# Patient Record
Sex: FEMALE | Race: WHITE | NOT HISPANIC OR LATINO | Employment: UNEMPLOYED | URBAN - METROPOLITAN AREA
[De-identification: names, ages, dates, MRNs, and addresses within clinical notes are randomized per-mention and may not be internally consistent; named-entity substitution may affect disease eponyms.]

---

## 2022-10-03 ENCOUNTER — HOSPITAL ENCOUNTER (EMERGENCY)
Facility: HOSPITAL | Age: 77
Discharge: HOME | End: 2022-10-04
Attending: EMERGENCY MEDICINE
Payer: MEDICARE

## 2022-10-03 DIAGNOSIS — M62.838 MUSCLE SPASM: Primary | ICD-10-CM

## 2022-10-03 PROCEDURE — 99284 EMERGENCY DEPT VISIT MOD MDM: CPT

## 2022-10-03 RX ORDER — ORPHENADRINE CITRATE 30 MG/ML
30 INJECTION INTRAMUSCULAR; INTRAVENOUS
Status: COMPLETED | OUTPATIENT
Start: 2022-10-04 | End: 2022-10-03

## 2022-10-03 RX ORDER — HYDROCODONE BITARTRATE AND ACETAMINOPHEN 7.5; 325 MG/1; MG/1
1 TABLET ORAL EVERY 6 HOURS PRN
Status: DISCONTINUED | OUTPATIENT
Start: 2022-10-04 | End: 2022-10-04 | Stop reason: HOSPADM

## 2022-10-03 RX ORDER — KETOROLAC TROMETHAMINE 30 MG/ML
30 INJECTION, SOLUTION INTRAMUSCULAR; INTRAVENOUS
Status: COMPLETED | OUTPATIENT
Start: 2022-10-04 | End: 2022-10-03

## 2022-10-03 RX ADMIN — ORPHENADRINE CITRATE 30 MG: 30 INJECTION INTRAMUSCULAR; INTRAVENOUS at 11:10

## 2022-10-03 RX ADMIN — KETOROLAC TROMETHAMINE 30 MG: 30 INJECTION, SOLUTION INTRAMUSCULAR at 11:10

## 2022-10-04 VITALS
SYSTOLIC BLOOD PRESSURE: 138 MMHG | HEART RATE: 111 BPM | WEIGHT: 170 LBS | TEMPERATURE: 99 F | RESPIRATION RATE: 18 BRPM | OXYGEN SATURATION: 97 % | HEIGHT: 65 IN | DIASTOLIC BLOOD PRESSURE: 68 MMHG | BODY MASS INDEX: 28.32 KG/M2

## 2022-10-04 PROCEDURE — 96372 THER/PROPH/DIAG INJ SC/IM: CPT | Performed by: EMERGENCY MEDICINE

## 2022-10-04 PROCEDURE — 25000003 PHARM REV CODE 250: Performed by: EMERGENCY MEDICINE

## 2022-10-04 PROCEDURE — 63600175 PHARM REV CODE 636 W HCPCS: Performed by: EMERGENCY MEDICINE

## 2022-10-04 RX ORDER — DULOXETIN HYDROCHLORIDE 20 MG/1
20 CAPSULE, DELAYED RELEASE ORAL NIGHTLY
COMMUNITY
End: 2023-02-02 | Stop reason: SDUPTHER

## 2022-10-04 RX ORDER — HYDROMORPHONE HYDROCHLORIDE 2 MG/ML
1 INJECTION, SOLUTION INTRAMUSCULAR; INTRAVENOUS; SUBCUTANEOUS
Status: COMPLETED | OUTPATIENT
Start: 2022-10-04 | End: 2022-10-04

## 2022-10-04 RX ORDER — KETOROLAC TROMETHAMINE 10 MG/1
10 TABLET, FILM COATED ORAL EVERY 6 HOURS
COMMUNITY

## 2022-10-04 RX ORDER — GUAIFENESIN 600 MG/1
1200 TABLET, EXTENDED RELEASE ORAL 3 TIMES DAILY
COMMUNITY

## 2022-10-04 RX ADMIN — HYDROMORPHONE HYDROCHLORIDE 1 MG: 2 INJECTION INTRAMUSCULAR; INTRAVENOUS; SUBCUTANEOUS at 01:10

## 2022-10-04 RX ADMIN — HYDROCODONE BITARTRATE AND ACETAMINOPHEN 1 TABLET: 7.5; 325 TABLET ORAL at 12:10

## 2022-10-04 NOTE — ED PROVIDER NOTES
Encounter Date: 10/3/2022       History     Chief Complaint   Patient presents with    Spasms     Since 2 O clock today, pts daughter states this happens occasionally      Pt here with spasms in her RLE stump which is chronic recurrent. No trauma.     The history is provided by a relative.   Review of patient's allergies indicates:   Allergen Reactions    Penicillins      Past Medical History:   Diagnosis Date    Stroke      Past Surgical History:   Procedure Laterality Date    Right Above knee amputation       History reviewed. No pertinent family history.  Social History     Tobacco Use    Smoking status: Never    Smokeless tobacco: Never   Substance Use Topics    Alcohol use: Never    Drug use: Never     Review of Systems   Constitutional:  Negative for fever.   Musculoskeletal:  Negative for back pain.   Skin:  Negative for rash.   All other systems reviewed and are negative.    Physical Exam     Initial Vitals   BP Pulse Resp Temp SpO2   10/04/22 0027 10/03/22 2348 10/03/22 2348 10/03/22 2348 10/03/22 2348   (!) 174/77 (!) 111 18 98.6 °F (37 °C) 99 %      MAP       --                Physical Exam    Nursing note and vitals reviewed.  Constitutional: She appears well-developed and well-nourished. No distress.   HENT:   Mouth/Throat: Oropharynx is clear and moist.   Eyes: No scleral icterus.   Neck: Neck supple.   Normal range of motion.  Cardiovascular:  Normal rate, regular rhythm, normal heart sounds and intact distal pulses.           Pulmonary/Chest: Breath sounds normal.   Abdominal: Abdomen is soft. She exhibits no distension. There is no abdominal tenderness.   Musculoskeletal:      Cervical back: Normal range of motion and neck supple.      Comments: Intermittent flexion spasms in right AKA stump. No SOI. Otherwise normal MSK exam.     Neurological: She is alert. She has normal strength. No sensory deficit.   Baseline mental status per family   Skin: Skin is warm and dry. Capillary refill takes less than  2 seconds. No rash noted. No erythema. No pallor.       ED Course   Procedures  Labs Reviewed - No data to display       Imaging Results    None          Medications   HYDROcodone-acetaminophen 7.5-325 mg per tablet 1 tablet (1 tablet Oral Given 10/4/22 0050)   ketorolac injection 30 mg (30 mg Intramuscular Given 10/3/22 2356)   orphenadrine injection 30 mg (30 mg Intramuscular Given 10/3/22 2356)   HYDROmorphone (PF) injection 1 mg (1 mg Intramuscular Given 10/4/22 0120)     Medical Decision Making:   ED Management:  Pt presented with acute on chronic muscle spasms on her RLE stump/hip. She was given toradol, norflex and norco with minimal improvement. Pt given dilaudid with resolution of her pain/spasms.            ED Course as of 10/04/22 0244   Tue Oct 04, 2022   0118 No improvement with toradol, norflex and lortab.  [DC]      ED Course User Index  [DC] Sami Murillo Jr., MD                 Clinical Impression:   Final diagnoses:  [M62.838] Muscle spasm (Primary)      ED Disposition Condition    Discharge Stable          ED Prescriptions    None       Follow-up Information       Follow up With Specialties Details Why Contact Info    Primary care clinic  Schedule an appointment as soon as possible for a visit                Sami Murillo Jr., MD  10/04/22 0244

## 2022-10-04 NOTE — ED NOTES
Pt in bed, rocking back in forth.  Medication given at this time.  See MAR.  Pt tolerated well.  NAD noted. Will continue to monitor.

## 2022-10-04 NOTE — ED NOTES
Pt desating to 86% on RA.  Pt placed on 2L NC.  Pt resting comfortably in bed.  NAD noted.  Will continue to monitor.

## 2022-10-04 NOTE — ED TRIAGE NOTES
Pt with body spasms that started today at 2. Pts daughter states this usually happens when pt has an infection, currently being treated for an ear infection.

## 2022-10-10 ENCOUNTER — OFFICE VISIT (OUTPATIENT)
Dept: FAMILY MEDICINE | Facility: CLINIC | Age: 77
End: 2022-10-10
Payer: MEDICARE

## 2022-10-10 VITALS
WEIGHT: 110 LBS | DIASTOLIC BLOOD PRESSURE: 76 MMHG | HEART RATE: 71 BPM | HEIGHT: 65 IN | BODY MASS INDEX: 18.33 KG/M2 | SYSTOLIC BLOOD PRESSURE: 114 MMHG | OXYGEN SATURATION: 95 %

## 2022-10-10 DIAGNOSIS — R25.2 LEG CRAMPS: ICD-10-CM

## 2022-10-10 DIAGNOSIS — R56.9 SEIZURES: ICD-10-CM

## 2022-10-10 DIAGNOSIS — Z74.09 DIFFICULTY MOBILIZING IN HOME: ICD-10-CM

## 2022-10-10 DIAGNOSIS — Z89.611 HX OF AKA (ABOVE KNEE AMPUTATION), RIGHT: ICD-10-CM

## 2022-10-10 DIAGNOSIS — R68.2 DRY MOUTH: Primary | ICD-10-CM

## 2022-10-10 DIAGNOSIS — Z86.73 H/O: CVA (CEREBROVASCULAR ACCIDENT): ICD-10-CM

## 2022-10-10 DIAGNOSIS — I69.320 APHASIA AS LATE EFFECT OF CEREBROVASCULAR ACCIDENT: ICD-10-CM

## 2022-10-10 PROBLEM — M25.511 CHRONIC RIGHT SHOULDER PAIN: Status: ACTIVE | Noted: 2019-08-05

## 2022-10-10 PROBLEM — F41.8 ANXIETY WITH DEPRESSION: Status: ACTIVE | Noted: 2019-09-16

## 2022-10-10 PROBLEM — Z72.0 TOBACCO ABUSE: Status: ACTIVE | Noted: 2019-07-16

## 2022-10-10 PROBLEM — G89.29 CHRONIC RIGHT SHOULDER PAIN: Status: ACTIVE | Noted: 2019-08-05

## 2022-10-10 PROBLEM — R13.10 DYSPHAGIA: Status: ACTIVE | Noted: 2019-09-18

## 2022-10-10 PROCEDURE — 99214 PR OFFICE/OUTPT VISIT, EST, LEVL IV, 30-39 MIN: ICD-10-PCS | Mod: S$PBB,,, | Performed by: STUDENT IN AN ORGANIZED HEALTH CARE EDUCATION/TRAINING PROGRAM

## 2022-10-10 PROCEDURE — 99999 PR PBB SHADOW E&M-EST. PATIENT-LVL IV: CPT | Mod: PBBFAC,,, | Performed by: STUDENT IN AN ORGANIZED HEALTH CARE EDUCATION/TRAINING PROGRAM

## 2022-10-10 PROCEDURE — 99214 OFFICE O/P EST MOD 30 MIN: CPT | Mod: S$PBB,,, | Performed by: STUDENT IN AN ORGANIZED HEALTH CARE EDUCATION/TRAINING PROGRAM

## 2022-10-10 PROCEDURE — 99999 PR PBB SHADOW E&M-EST. PATIENT-LVL IV: ICD-10-PCS | Mod: PBBFAC,,, | Performed by: STUDENT IN AN ORGANIZED HEALTH CARE EDUCATION/TRAINING PROGRAM

## 2022-10-10 PROCEDURE — 99214 OFFICE O/P EST MOD 30 MIN: CPT | Mod: PBBFAC,PN | Performed by: STUDENT IN AN ORGANIZED HEALTH CARE EDUCATION/TRAINING PROGRAM

## 2022-10-10 RX ORDER — ASPIRIN 325 MG
325 TABLET, DELAYED RELEASE (ENTERIC COATED) ORAL
COMMUNITY

## 2022-10-10 RX ORDER — BACLOFEN 5 MG/1
5 TABLET ORAL
COMMUNITY
Start: 2021-11-11

## 2022-10-10 RX ORDER — LACTOBACILLUS ACIDOPHILUS 500MM CELL
CAPSULE ORAL DAILY
COMMUNITY

## 2022-10-10 RX ORDER — CIPROFLOXACIN 500 MG/1
500 TABLET ORAL 2 TIMES DAILY
COMMUNITY
End: 2023-08-22

## 2022-10-10 RX ORDER — IBUPROFEN 400 MG/1
400 TABLET ORAL EVERY 6 HOURS PRN
COMMUNITY

## 2022-10-10 RX ORDER — FLUCONAZOLE 150 MG/1
150 TABLET ORAL DAILY
COMMUNITY
End: 2023-02-13 | Stop reason: SDUPTHER

## 2022-10-10 RX ORDER — LEVETIRACETAM 250 MG/1
0.5 TABLET ORAL 2 TIMES DAILY
COMMUNITY
Start: 2022-09-24

## 2022-10-10 RX ORDER — LEVETIRACETAM 500 MG/1
TABLET ORAL 2 TIMES DAILY
COMMUNITY
Start: 2022-09-24

## 2022-10-10 RX ORDER — CYCLOBENZAPRINE HCL 10 MG
10 TABLET ORAL 3 TIMES DAILY PRN
COMMUNITY

## 2022-10-10 NOTE — PATIENT INSTRUCTIONS

## 2022-10-10 NOTE — PROGRESS NOTES
Subjective:       Patient ID: Meaghan Pan is a 77 y.o. female.    Chief Complaint: Otalgia (Left ear pain, pt had ear infection 2 weeks ago and was treated with abx but still having issues. Pt c/o wax build up as well. Pt would like to schedule Flu shot at later date. )    She had an ear infection that was treated incompletly  Then she started to get leg cramps (she gets these with infections) in her right leg.  She then changed antibiotics and she had lots of wax in her ears.    She is having some drainage, some cough (but that is chronic).  She takes guafinacin chronically for this.     She is having dry mouth from her medications.  Cotton mouth and feeling like her lips get stuck.     This began after her keppra increase (for breakthrough seizures)  And increased cyclobenzaprine.         Review of Systems   Constitutional:  Negative for activity change and appetite change.   HENT:  Positive for mouth dryness.    Respiratory:  Negative for shortness of breath.    Cardiovascular:  Negative for chest pain.   Gastrointestinal:  Negative for abdominal pain.   Genitourinary:  Negative for dysuria.   Integumentary:  Negative for rash.   Neurological:  Positive for dizziness.   Psychiatric/Behavioral:  Negative for dysphoric mood and sleep disturbance. The patient is not nervous/anxious.        Objective:      Physical Exam  Constitutional:       General: She is not in acute distress.     Appearance: Normal appearance. She is not ill-appearing.   HENT:      Right Ear: There is impacted cerumen.      Left Ear: There is impacted cerumen.   Eyes:      Conjunctiva/sclera: Conjunctivae normal.   Cardiovascular:      Rate and Rhythm: Normal rate and regular rhythm.      Pulses: Normal pulses.      Heart sounds: Normal heart sounds. No murmur heard.  Pulmonary:      Effort: Pulmonary effort is normal. No respiratory distress.      Breath sounds: Normal breath sounds. No wheezing.   Abdominal:      Palpations: Abdomen is  soft.   Musculoskeletal:      Right lower leg: No edema.      Left lower leg: No edema.      Comments: Right aka  Right contracture   Skin:     General: Skin is warm and dry.      Findings: No rash.   Neurological:      Mental Status: She is alert. Mental status is at baseline.      Gait: Gait abnormal (in a wheelchair).   Psychiatric:         Mood and Affect: Mood normal.         Behavior: Behavior normal.      Comments: aphasia       Assessment:       1. Dry mouth    2. Leg cramps    3. Seizures    4. H/O: CVA (cerebrovascular accident)    5. Aphasia as late effect of cerebrovascular accident    6. Difficulty mobilizing in home    7. Hx of AKA (above knee amputation), right        Plan:       Problem List Items Addressed This Visit          Neuro    H/O: CVA (cerebrovascular accident)     Doing much better         Relevant Orders    Ambulatory referral/consult to Home Health    HME - OTHER    Aphasia as late effect of cerebrovascular accident     stable         Seizures     Doing better on her keppra            Other    Difficulty mobilizing in home     Worsening debilty. Needs home health/pt and wheelchair with potty seat ability due to weakness          Relevant Orders    Ambulatory referral/consult to Home Health    HME - OTHER    Leg cramps     Doing better with prn muscle relaxers. Minimize to minimize side effects          Other Visit Diagnoses       Dry mouth    -  Primary    Hx of AKA (above knee amputation), right        Relevant Orders    Ambulatory referral/consult to Home Health    HME - OTHER              Minimize drying medications.  Continue current regimen of cipro  Flush ears.

## 2022-10-12 DIAGNOSIS — Z78.0 MENOPAUSE: ICD-10-CM

## 2022-10-12 DIAGNOSIS — Z11.59 NEED FOR HEPATITIS C SCREENING TEST: ICD-10-CM

## 2022-10-13 PROCEDURE — G0180 MD CERTIFICATION HHA PATIENT: HCPCS | Mod: ,,, | Performed by: STUDENT IN AN ORGANIZED HEALTH CARE EDUCATION/TRAINING PROGRAM

## 2022-10-13 PROCEDURE — G0180 PR HOME HEALTH MD CERTIFICATION: ICD-10-PCS | Mod: ,,, | Performed by: STUDENT IN AN ORGANIZED HEALTH CARE EDUCATION/TRAINING PROGRAM

## 2022-12-23 ENCOUNTER — TELEPHONE (OUTPATIENT)
Dept: FAMILY MEDICINE | Facility: CLINIC | Age: 77
End: 2022-12-23
Payer: MEDICARE

## 2023-01-03 NOTE — TELEPHONE ENCOUNTER
Spoke to pt's daughter about setting up AWV  Declines for now but will call back if changes her mind

## 2023-01-11 ENCOUNTER — EXTERNAL HOME HEALTH (OUTPATIENT)
Dept: HOME HEALTH SERVICES | Facility: HOSPITAL | Age: 78
End: 2023-01-11
Payer: MEDICARE

## 2023-02-12 ENCOUNTER — PATIENT MESSAGE (OUTPATIENT)
Dept: FAMILY MEDICINE | Facility: CLINIC | Age: 78
End: 2023-02-12
Payer: MEDICARE

## 2023-02-13 ENCOUNTER — PATIENT MESSAGE (OUTPATIENT)
Dept: FAMILY MEDICINE | Facility: CLINIC | Age: 78
End: 2023-02-13
Payer: MEDICARE

## 2023-02-13 RX ORDER — FLUCONAZOLE 150 MG/1
150 TABLET ORAL DAILY
Qty: 2 TABLET | Refills: 0 | Status: SHIPPED | OUTPATIENT
Start: 2023-02-13 | End: 2023-02-15

## 2023-03-06 ENCOUNTER — PATIENT MESSAGE (OUTPATIENT)
Dept: FAMILY MEDICINE | Facility: CLINIC | Age: 78
End: 2023-03-06
Payer: MEDICARE

## 2023-03-13 ENCOUNTER — PATIENT MESSAGE (OUTPATIENT)
Dept: FAMILY MEDICINE | Facility: CLINIC | Age: 78
End: 2023-03-13

## 2023-03-13 ENCOUNTER — HOSPITAL ENCOUNTER (OUTPATIENT)
Dept: RADIOLOGY | Facility: HOSPITAL | Age: 78
Discharge: HOME | End: 2023-03-13
Attending: STUDENT IN AN ORGANIZED HEALTH CARE EDUCATION/TRAINING PROGRAM
Payer: MEDICARE

## 2023-03-13 ENCOUNTER — OFFICE VISIT (OUTPATIENT)
Dept: FAMILY MEDICINE | Facility: CLINIC | Age: 78
End: 2023-03-13
Payer: MEDICARE

## 2023-03-13 VITALS
HEART RATE: 80 BPM | HEIGHT: 65 IN | OXYGEN SATURATION: 99 % | SYSTOLIC BLOOD PRESSURE: 110 MMHG | DIASTOLIC BLOOD PRESSURE: 72 MMHG | BODY MASS INDEX: 18.3 KG/M2

## 2023-03-13 DIAGNOSIS — R13.10 DYSPHAGIA, UNSPECIFIED TYPE: ICD-10-CM

## 2023-03-13 DIAGNOSIS — Z86.73 H/O: CVA (CEREBROVASCULAR ACCIDENT): ICD-10-CM

## 2023-03-13 DIAGNOSIS — Z89.611 HX OF AKA (ABOVE KNEE AMPUTATION), RIGHT: ICD-10-CM

## 2023-03-13 DIAGNOSIS — B37.2 CANDIDAL INTERTRIGO: ICD-10-CM

## 2023-03-13 DIAGNOSIS — R05.1 ACUTE COUGH: ICD-10-CM

## 2023-03-13 DIAGNOSIS — T17.908A ASPIRATION INTO AIRWAY, INITIAL ENCOUNTER: ICD-10-CM

## 2023-03-13 DIAGNOSIS — I69.320 APHASIA AS LATE EFFECT OF CEREBROVASCULAR ACCIDENT: ICD-10-CM

## 2023-03-13 DIAGNOSIS — R05.1 ACUTE COUGH: Primary | ICD-10-CM

## 2023-03-13 DIAGNOSIS — R56.9 SEIZURES: ICD-10-CM

## 2023-03-13 PROCEDURE — 99214 OFFICE O/P EST MOD 30 MIN: CPT | Mod: PBBFAC,PN,25 | Performed by: STUDENT IN AN ORGANIZED HEALTH CARE EDUCATION/TRAINING PROGRAM

## 2023-03-13 PROCEDURE — 99999 PR PBB SHADOW E&M-EST. PATIENT-LVL IV: ICD-10-PCS | Mod: PBBFAC,,, | Performed by: STUDENT IN AN ORGANIZED HEALTH CARE EDUCATION/TRAINING PROGRAM

## 2023-03-13 PROCEDURE — 71046 X-RAY EXAM CHEST 2 VIEWS: CPT | Mod: 26,,, | Performed by: RADIOLOGY

## 2023-03-13 PROCEDURE — 71046 XR CHEST PA AND LATERAL: ICD-10-PCS | Mod: 26,,, | Performed by: RADIOLOGY

## 2023-03-13 PROCEDURE — 99214 OFFICE O/P EST MOD 30 MIN: CPT | Mod: S$PBB,,, | Performed by: STUDENT IN AN ORGANIZED HEALTH CARE EDUCATION/TRAINING PROGRAM

## 2023-03-13 PROCEDURE — 99999 PR PBB SHADOW E&M-EST. PATIENT-LVL IV: CPT | Mod: PBBFAC,,, | Performed by: STUDENT IN AN ORGANIZED HEALTH CARE EDUCATION/TRAINING PROGRAM

## 2023-03-13 PROCEDURE — 99214 PR OFFICE/OUTPT VISIT, EST, LEVL IV, 30-39 MIN: ICD-10-PCS | Mod: S$PBB,,, | Performed by: STUDENT IN AN ORGANIZED HEALTH CARE EDUCATION/TRAINING PROGRAM

## 2023-03-13 PROCEDURE — 71046 X-RAY EXAM CHEST 2 VIEWS: CPT | Mod: TC,PN

## 2023-03-13 RX ORDER — KETOCONAZOLE 20 MG/G
CREAM TOPICAL DAILY
Qty: 60 G | Refills: 2 | Status: SHIPPED | OUTPATIENT
Start: 2023-03-13

## 2023-03-13 RX ORDER — FLUCONAZOLE 150 MG/1
150 TABLET ORAL DAILY
Qty: 2 TABLET | Refills: 2 | Status: SHIPPED | OUTPATIENT
Start: 2023-03-13 | End: 2023-03-14

## 2023-03-13 NOTE — PROGRESS NOTES
Subjective:       Patient ID: Meaghan Pan is a 77 y.o. female.    Chief Complaint: Medication Problem (Discuss medication may need to increase lungs sound like they are filling up again)    Worsening cough and frequent aspiration  She has a history of aspiration and declines thickened liquids.  She is unable to eat chicken anymore due to choking.  She has a worsening wet cough. No fever. No sinus congestion. No one else is ill.    She is having symptoms of feeling the prodrom prior to her seizures.  This is when her am dose is wearing off before her lunchtime dose.  No seizures but will get sleepy/lethargic and acts like she does before a seizure.      Review of Systems   Constitutional:  Negative for activity change, appetite change, fatigue and unexpected weight change.   HENT:  Negative for trouble swallowing.    Eyes:  Negative for visual disturbance.   Respiratory:  Positive for cough. Negative for chest tightness, shortness of breath and wheezing.    Cardiovascular:  Negative for chest pain and leg swelling.   Gastrointestinal:  Negative for abdominal pain, blood in stool, change in bowel habit and change in bowel habit.   Endocrine: Negative for cold intolerance, heat intolerance, polydipsia and polyuria.   Genitourinary:  Negative for dysuria and hematuria.   Musculoskeletal:  Negative for arthralgias, back pain and gait problem.   Integumentary:  Negative for rash and wound.   Neurological:  Positive for seizures. Negative for dizziness, syncope, weakness and headaches.   Psychiatric/Behavioral:  Negative for dysphoric mood and sleep disturbance. The patient is not nervous/anxious.        Objective:      Physical Exam  Constitutional:       General: She is not in acute distress.     Appearance: Normal appearance. She is not ill-appearing.   Eyes:      Conjunctiva/sclera: Conjunctivae normal.   Cardiovascular:      Rate and Rhythm: Normal rate and regular rhythm.      Pulses: Normal pulses.      Heart  sounds: Normal heart sounds. No murmur heard.  Pulmonary:      Effort: Pulmonary effort is normal. No respiratory distress.      Breath sounds: Rhonchi present. No wheezing or rales.   Musculoskeletal:      Comments: Right aka  Right contracture   Skin:     General: Skin is warm and dry.      Findings: No rash.   Neurological:      Mental Status: She is alert. Mental status is at baseline.      Gait: Gait abnormal (in a wheelchair).   Psychiatric:         Mood and Affect: Mood normal.         Behavior: Behavior normal.         Thought Content: Thought content normal.         Judgment: Judgment normal.      Comments: aphasia       Assessment:       1. Acute cough    2. Candidal intertrigo    3. Aspiration into airway, initial encounter    4. H/O: CVA (cerebrovascular accident)    5. Aphasia as late effect of cerebrovascular accident    6. Seizures    7. Dysphagia, unspecified type    8. Hx of AKA (above knee amputation), right          Plan:       Problem List Items Addressed This Visit          Neuro    H/O: CVA (cerebrovascular accident)     Known aspiration. Declines thickener.         Relevant Orders    X-Ray Chest PA And Lateral    Ambulatory referral/consult to Speech Therapy    Ambulatory referral/consult to Home Health    Aphasia as late effect of cerebrovascular accident     Unchanged. stable         Seizures     No full seizures but feels the prodrome coming on before her med dose at midday  Increase am dose, keep middle of day dose and evening dose the same for now.            GI    Dysphagia     Chronic. Worsening. Speech eval         Relevant Orders    Ambulatory referral/consult to Home Health       Orthopedic    Hx of AKA (above knee amputation), right     Unchanged. Stable. Wheelchair user          Other Visit Diagnoses       Acute cough    -  Primary    no symptoms of uri or pneumonia.  suspect due to aspiration which seems to be worsening.  Speech eval. Xray for pneumonia    Relevant Orders    X-Ray  Chest PA And Lateral    Ambulatory referral/consult to Speech Therapy    Candidal intertrigo        Refill her treatment as she is getting into the summer and increasing frequency    Relevant Medications    fluconazole (DIFLUCAN) 150 MG Tab    ketoconazole (NIZORAL) 2 % cream    Aspiration into airway, initial encounter        Re-eval speech/swallow to see if straw/ change will help    Relevant Orders    X-Ray Chest PA And Lateral    Ambulatory referral/consult to Speech Therapy    Ambulatory referral/consult to Home Health

## 2023-03-13 NOTE — ASSESSMENT & PLAN NOTE
No full seizures but feels the prodrome coming on before her med dose at midday  Increase am dose, keep middle of day dose and evening dose the same for now.

## 2023-03-16 PROCEDURE — G0180 PR HOME HEALTH MD CERTIFICATION: ICD-10-PCS | Mod: ,,, | Performed by: STUDENT IN AN ORGANIZED HEALTH CARE EDUCATION/TRAINING PROGRAM

## 2023-03-16 PROCEDURE — G0180 MD CERTIFICATION HHA PATIENT: HCPCS | Mod: ,,, | Performed by: STUDENT IN AN ORGANIZED HEALTH CARE EDUCATION/TRAINING PROGRAM

## 2023-03-29 ENCOUNTER — PATIENT MESSAGE (OUTPATIENT)
Dept: FAMILY MEDICINE | Facility: CLINIC | Age: 78
End: 2023-03-29
Payer: MEDICARE

## 2023-04-07 ENCOUNTER — PATIENT MESSAGE (OUTPATIENT)
Dept: FAMILY MEDICINE | Facility: CLINIC | Age: 78
End: 2023-04-07
Payer: MEDICARE

## 2023-04-11 ENCOUNTER — PATIENT MESSAGE (OUTPATIENT)
Dept: ADMINISTRATIVE | Facility: HOSPITAL | Age: 78
End: 2023-04-11
Payer: MEDICARE

## 2023-04-28 ENCOUNTER — TELEPHONE (OUTPATIENT)
Dept: FAMILY MEDICINE | Facility: CLINIC | Age: 78
End: 2023-04-28
Payer: MEDICARE

## 2023-04-28 ENCOUNTER — OFFICE VISIT (OUTPATIENT)
Dept: URGENT CARE | Facility: CLINIC | Age: 78
End: 2023-04-28
Payer: MEDICARE

## 2023-04-28 VITALS
WEIGHT: 126 LBS | HEIGHT: 62 IN | OXYGEN SATURATION: 97 % | RESPIRATION RATE: 18 BRPM | BODY MASS INDEX: 23.19 KG/M2 | HEART RATE: 86 BPM | DIASTOLIC BLOOD PRESSURE: 78 MMHG | SYSTOLIC BLOOD PRESSURE: 126 MMHG | TEMPERATURE: 98 F

## 2023-04-28 DIAGNOSIS — S40.811A ABRASION OF RIGHT UPPER EXTREMITY, INITIAL ENCOUNTER: Primary | ICD-10-CM

## 2023-04-28 DIAGNOSIS — M79.601 ARM PAIN, ANTERIOR, RIGHT: ICD-10-CM

## 2023-04-28 PROCEDURE — 99203 OFFICE O/P NEW LOW 30 MIN: CPT | Mod: ,,, | Performed by: NURSE PRACTITIONER

## 2023-04-28 PROCEDURE — 99203 PR OFFICE/OUTPT VISIT, NEW, LEVL III, 30-44 MIN: ICD-10-PCS | Mod: ,,, | Performed by: NURSE PRACTITIONER

## 2023-04-28 NOTE — PROGRESS NOTES
"Subjective:       Patient ID: Meaghan Pan is a 77 y.o. female.    Vitals:  height is 5' 2" (1.575 m) and weight is 57.2 kg (126 lb). Her oral temperature is 97.6 °F (36.4 °C). Her blood pressure is 126/78 and her pulse is 86. Her respiration is 18 and oxygen saturation is 97%.     Chief Complaint: Otalgia    This is a 77 y.o. female who presents today with a chief complaint of Ear Pain and a Fall.   Patient presents with Otalgia    Patient daughters states LT Ear Pain since yesterday. States congestion as well. States spasms in RT leg. States a fall this morning and a skin tear on her RT elbow. Patient's daughter stated a fall this morning when patient was making coffee. Stated she lost her balance and fell, not really sure what patient hit due to her not being able to say exactly.     Otalgia   There is pain in the left ear. This is a new problem. The current episode started today. The problem occurs constantly. The problem has been unchanged. There has been no fever. She has tried nothing for the symptoms. The treatment provided no relief.     HENT:  Positive for ear pain.          Objective:      Physical Exam   Constitutional: She is oriented to person, place, and time. She appears well-developed.   HENT:   Head: Normocephalic and atraumatic.   Ears:   Right Ear: External ear normal.   Left Ear: External ear normal.   Nose: Nose normal.   Mouth/Throat: Mucous membranes are normal.   Eyes: Conjunctivae and lids are normal.   Neck: Trachea normal. Neck supple.   Cardiovascular: Normal rate, regular rhythm and normal heart sounds.   Pulmonary/Chest: Effort normal and breath sounds normal. No respiratory distress.   Abdominal: Normal appearance and bowel sounds are normal. She exhibits no distension and no mass. Soft. There is no abdominal tenderness.   Musculoskeletal: Normal range of motion.         General: Normal range of motion.   Neurological: She is alert and oriented to person, place, and time. She has " normal strength.   Skin: Skin is warm, dry, intact, not diaphoretic and not pale.   Psychiatric: Her speech is normal and behavior is normal. Judgment and thought content normal.   Nursing note and vitals reviewed.      Past medical history and current medications reviewed.     Skin tear to right elbow/upper arm area, not bleeding minor skin tear.     Redressed elbow,cleaned area non stick and bandage.     Xrays normal no acute injury   Assessment:           1. Abrasion of right upper extremity, initial encounter    2. Arm pain, anterior, right              Plan:         Abrasion of right upper extremity, initial encounter    Arm pain, anterior, right  -     XR ELBOW 2 VIEWS RIGHT; Future; Expected date: 04/28/2023  -     XR CHEST PA AND LATERAL; Future; Expected date: 04/28/2023             Patient Instructions     You must understand that you've received an Urgent Care treatment only and that you may be released before all your medical problems are known or treated. You, the patient, will arrange for follow up care as instructed.  Follow up with your PCP or specialty clinic as directed in the next 1-2 weeks if not improved or as needed.  You can call [phone] to schedule an appointment with the appropriate provider.  If your condition worsens we recommend that you receive another evaluation at the emergency room immediately or contact your primary medical clinics after hours call service to discuss your concerns.  Please return here or go to the Emergency Department for any concerns or worsening of condition.  Please if you smoke please consider quitting. [facility] Smoke cessation hotline number is [phone], available at this number is free counseling and medications to live a healthier life!       If you were prescribed a narcotic or controlled medication, do not drive or operate heavy equipment or machinery while taking these medications.    If you were not prescribed an antibiotic and your not better  please return for a recheck. Antibiotic therapy is not always indicated initially.   Please attempt over the counter medications, give it time and try Echinacea, Zinc and Vitamin C to fight common colds and virus.

## 2023-04-28 NOTE — TELEPHONE ENCOUNTER
Spoke with daughter advised them to go to urgent care for possible ear infection. No available appointmets with providers in this clinic today.

## 2023-04-28 NOTE — PATIENT INSTRUCTIONS
You must understand that you've received an Urgent Care treatment only and that you may be released before all your medical problems are known or treated. You, the patient, will arrange for follow up care as instructed.  Follow up with your PCP or specialty clinic as directed in the next 1-2 weeks if not improved or as needed.  You can call [phone] to schedule an appointment with the appropriate provider.  If your condition worsens we recommend that you receive another evaluation at the emergency room immediately or contact your primary medical clinics after hours call service to discuss your concerns.  Please return here or go to the Emergency Department for any concerns or worsening of condition.  Please if you smoke please consider quitting. [facility] Smoke cessation hotline number is [phone], available at this number is free counseling and medications to live a healthier life!       If you were prescribed a narcotic or controlled medication, do not drive or operate heavy equipment or machinery while taking these medications.    If you were not prescribed an antibiotic and your not better please return for a recheck. Antibiotic therapy is not always indicated initially.   Please attempt over the counter medications, give it time and try Echinacea, Zinc and Vitamin C to fight common colds and virus.

## 2023-05-09 ENCOUNTER — PATIENT MESSAGE (OUTPATIENT)
Dept: FAMILY MEDICINE | Facility: CLINIC | Age: 78
End: 2023-05-09
Payer: MEDICARE

## 2023-05-15 ENCOUNTER — DOCUMENT SCAN (OUTPATIENT)
Dept: HOME HEALTH SERVICES | Facility: HOSPITAL | Age: 78
End: 2023-05-15
Payer: MEDICARE

## 2023-05-16 ENCOUNTER — EXTERNAL HOME HEALTH (OUTPATIENT)
Dept: HOME HEALTH SERVICES | Facility: HOSPITAL | Age: 78
End: 2023-05-16
Payer: MEDICARE

## 2023-05-19 ENCOUNTER — DOCUMENT SCAN (OUTPATIENT)
Dept: HOME HEALTH SERVICES | Facility: HOSPITAL | Age: 78
End: 2023-05-19
Payer: MEDICARE

## 2023-06-27 ENCOUNTER — PATIENT MESSAGE (OUTPATIENT)
Dept: FAMILY MEDICINE | Facility: CLINIC | Age: 78
End: 2023-06-27
Payer: MEDICARE

## 2023-08-22 ENCOUNTER — E-VISIT (OUTPATIENT)
Dept: FAMILY MEDICINE | Facility: CLINIC | Age: 78
End: 2023-08-22
Payer: MEDICARE

## 2023-08-22 ENCOUNTER — OFFICE VISIT (OUTPATIENT)
Dept: URGENT CARE | Facility: CLINIC | Age: 78
End: 2023-08-22
Payer: MEDICARE

## 2023-08-22 VITALS
HEIGHT: 62 IN | BODY MASS INDEX: 23.19 KG/M2 | WEIGHT: 126 LBS | HEART RATE: 81 BPM | SYSTOLIC BLOOD PRESSURE: 136 MMHG | DIASTOLIC BLOOD PRESSURE: 70 MMHG | OXYGEN SATURATION: 96 % | TEMPERATURE: 98 F

## 2023-08-22 DIAGNOSIS — J06.9 UPPER RESPIRATORY TRACT INFECTION, UNSPECIFIED TYPE: Primary | ICD-10-CM

## 2023-08-22 DIAGNOSIS — R05.1 ACUTE COUGH: ICD-10-CM

## 2023-08-22 DIAGNOSIS — M54.9 BACK PAIN, UNSPECIFIED BACK LOCATION, UNSPECIFIED BACK PAIN LATERALITY, UNSPECIFIED CHRONICITY: ICD-10-CM

## 2023-08-22 DIAGNOSIS — J40 BRONCHITIS: Primary | ICD-10-CM

## 2023-08-22 LAB
BILIRUB UR QL STRIP: NEGATIVE
CTP QC/QA: YES
GLUCOSE UR QL STRIP: NEGATIVE
KETONES UR QL STRIP: NEGATIVE
LEUKOCYTE ESTERASE UR QL STRIP: NEGATIVE
PH, POC UA: 6.5
POC BLOOD, URINE: NEGATIVE
POC NITRATES, URINE: NEGATIVE
PROT UR QL STRIP: NEGATIVE
SARS-COV-2 AG RESP QL IA.RAPID: NEGATIVE
SP GR UR STRIP: 1.01 (ref 1–1.03)
UROBILINOGEN UR STRIP-ACNC: NORMAL (ref 0.1–1.1)

## 2023-08-22 PROCEDURE — 99499 NO LOS: ICD-10-PCS | Mod: ,,, | Performed by: STUDENT IN AN ORGANIZED HEALTH CARE EDUCATION/TRAINING PROGRAM

## 2023-08-22 PROCEDURE — 99213 OFFICE O/P EST LOW 20 MIN: CPT | Mod: S$GLB,,, | Performed by: NURSE PRACTITIONER

## 2023-08-22 PROCEDURE — 99499 UNLISTED E&M SERVICE: CPT | Mod: ,,, | Performed by: STUDENT IN AN ORGANIZED HEALTH CARE EDUCATION/TRAINING PROGRAM

## 2023-08-22 PROCEDURE — 87811 SARS CORONAVIRUS 2 ANTIGEN POCT, MANUAL READ: ICD-10-PCS | Mod: QW,S$GLB,, | Performed by: NURSE PRACTITIONER

## 2023-08-22 PROCEDURE — 87811 SARS-COV-2 COVID19 W/OPTIC: CPT | Mod: QW,S$GLB,, | Performed by: NURSE PRACTITIONER

## 2023-08-22 PROCEDURE — 81003 POCT URINALYSIS, DIPSTICK, AUTOMATED, W/O SCOPE: ICD-10-PCS | Mod: QW,S$GLB,, | Performed by: NURSE PRACTITIONER

## 2023-08-22 PROCEDURE — 81003 URINALYSIS AUTO W/O SCOPE: CPT | Mod: QW,S$GLB,, | Performed by: NURSE PRACTITIONER

## 2023-08-22 PROCEDURE — 99213 PR OFFICE/OUTPT VISIT, EST, LEVL III, 20-29 MIN: ICD-10-PCS | Mod: S$GLB,,, | Performed by: NURSE PRACTITIONER

## 2023-08-22 RX ORDER — BENZONATATE 100 MG/1
100 CAPSULE ORAL 3 TIMES DAILY PRN
Qty: 15 CAPSULE | Refills: 0 | Status: SHIPPED | OUTPATIENT
Start: 2023-08-22 | End: 2023-08-27

## 2023-08-22 RX ORDER — AZITHROMYCIN 250 MG/1
TABLET, FILM COATED ORAL
Qty: 6 TABLET | Refills: 0 | Status: SHIPPED | OUTPATIENT
Start: 2023-08-22 | End: 2024-01-26

## 2023-08-22 NOTE — PROGRESS NOTES
Viral uri symptoms with sinus pressure, cough sore throat.  Symptoms present for 5 days.  Given her seizure threshold and medications would advise she be seen in person for covid evaluation and for evaluation of any signs of bacterial infection before starting antibiotics that could have interactions.

## 2023-08-22 NOTE — PROGRESS NOTES
"Subjective:       Patient ID: Meaghan Pan is a 77 y.o. female.    Vitals:  height is 5' 2" (1.575 m) and weight is 57.2 kg (126 lb). Her oral temperature is 98.1 °F (36.7 °C). Her blood pressure is 136/70 and her pulse is 81. Her oxygen saturation is 96%.     Chief Complaint: Otalgia (X 5 days ago began with redness on cheek, left ear pain)    This is a 77 y.o. female who presents today with a chief complaint of X 5 days ago began with redness on cheek, left ear pain.         Otalgia   There is pain in the left ear. This is a new problem. The current episode started in the past 7 days. Associated symptoms include coughing. Treatments tried: dayquil (child's dose) The treatment provided moderate relief.       HENT:  Positive for ear pain.    Respiratory:  Positive for cough.    Musculoskeletal:  Positive for back pain.           Objective:      Physical Exam   Constitutional: She is oriented to person, place, and time. normal  HENT:   Head: Normocephalic and atraumatic.   Ears:   Right Ear: Tympanic membrane, external ear and ear canal normal.   Left Ear: Tympanic membrane, external ear and ear canal normal.   Nose: Nose normal.   Mouth/Throat: Mucous membranes are moist. Oropharynx is clear.   Eyes: Conjunctivae are normal. Extraocular movement intact   Neck: Neck supple.   Cardiovascular: Normal rate, regular rhythm, normal heart sounds and normal pulses.   Pulmonary/Chest: Effort normal and breath sounds normal.   Abdominal: Normal appearance.   Musculoskeletal: Normal range of motion.         General: Normal range of motion.   Neurological: She is alert and oriented to person, place, and time.   Skin: Skin is warm and dry.   Psychiatric: Her behavior is normal. Mood normal.   Vitals reviewed.        Past medical history and current medications reviewed.       Assessment:           1. Bronchitis    2. Acute cough    3. Back pain, unspecified back location, unspecified back pain laterality, unspecified " chronicity              Plan:         Bronchitis  -     azithromycin (Z-MURTAZA) 250 MG tablet; Take 2 tablets by mouth on day 1; Take 1 tablet by mouth on days 2-5  Dispense: 6 tablet; Refill: 0  -     benzonatate (TESSALON) 100 MG capsule; Take 1 capsule (100 mg total) by mouth 3 (three) times daily as needed for Cough.  Dispense: 15 capsule; Refill: 0    Acute cough  -     XR CHEST PA AND LATERAL; Future; Expected date: 08/22/2023  -     SARS Coronavirus 2 Antigen, POCT Manual Read    Back pain, unspecified back location, unspecified back pain laterality, unspecified chronicity  -     POCT Urinalysis, Dipstick, Automated, W/O Scope             There are no Patient Instructions on file for this visit.           Medical Decision Making:   Differential Diagnosis:   URI  UTI

## 2023-08-25 ENCOUNTER — HOSPITAL ENCOUNTER (EMERGENCY)
Facility: HOSPITAL | Age: 78
Discharge: HOME | End: 2023-08-25
Payer: MEDICARE

## 2023-08-25 VITALS
OXYGEN SATURATION: 98 % | TEMPERATURE: 99 F | WEIGHT: 130 LBS | BODY MASS INDEX: 23.92 KG/M2 | HEIGHT: 62 IN | RESPIRATION RATE: 18 BRPM | HEART RATE: 93 BPM | SYSTOLIC BLOOD PRESSURE: 171 MMHG | DIASTOLIC BLOOD PRESSURE: 74 MMHG

## 2023-08-25 DIAGNOSIS — M54.2 NECK PAIN, ACUTE: ICD-10-CM

## 2023-08-25 DIAGNOSIS — S09.90XA INJURY OF HEAD, INITIAL ENCOUNTER: Primary | ICD-10-CM
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
## 2023-08-25 PROCEDURE — 72125 CT NECK SPINE W/O DYE: CPT | Mod: 26,,, | Performed by: RADIOLOGY

## 2023-08-25 PROCEDURE — 99284 EMERGENCY DEPT VISIT MOD MDM: CPT | Mod: 25

## 2023-08-25 PROCEDURE — 72125 CT NECK SPINE W/O DYE: CPT | Mod: TC

## 2023-08-25 PROCEDURE — 70450 CT HEAD/BRAIN W/O DYE: CPT | Mod: 26,,, | Performed by: RADIOLOGY

## 2023-08-25 PROCEDURE — 70450 CT HEAD/BRAIN W/O DYE: CPT | Mod: TC

## 2023-08-25 PROCEDURE — 70450 CT HEAD WITHOUT CONTRAST: ICD-10-PCS | Mod: 26,,, | Performed by: RADIOLOGY

## 2023-08-25 PROCEDURE — 72125 CT CERVICAL SPINE WITHOUT CONTRAST: ICD-10-PCS | Mod: 26,,, | Performed by: RADIOLOGY

## 2023-08-25 RX ORDER — HYDROCODONE BITARTRATE AND ACETAMINOPHEN 5; 325 MG/1; MG/1
1 TABLET ORAL EVERY 6 HOURS PRN
Qty: 10 TABLET | Refills: 0 | Status: SHIPPED | OUTPATIENT
Start: 2023-08-25 | End: 2024-01-26

## 2023-08-26 ENCOUNTER — PATIENT MESSAGE (OUTPATIENT)
Dept: FAMILY MEDICINE | Facility: CLINIC | Age: 78
End: 2023-08-26
Payer: MEDICARE

## 2023-08-26 ENCOUNTER — HOSPITAL ENCOUNTER (EMERGENCY)
Facility: HOSPITAL | Age: 78
Discharge: HOME | End: 2023-08-26
Attending: EMERGENCY MEDICINE
Payer: MEDICARE

## 2023-08-26 VITALS
HEIGHT: 62 IN | RESPIRATION RATE: 18 BRPM | BODY MASS INDEX: 23.92 KG/M2 | OXYGEN SATURATION: 97 % | WEIGHT: 130 LBS | DIASTOLIC BLOOD PRESSURE: 85 MMHG | SYSTOLIC BLOOD PRESSURE: 161 MMHG | TEMPERATURE: 98 F | HEART RATE: 93 BPM

## 2023-08-26 DIAGNOSIS — R56.1 SEIZURE AFTER HEAD INJURY: Primary | ICD-10-CM

## 2023-08-26 DIAGNOSIS — R41.82 AMS (ALTERED MENTAL STATUS): ICD-10-CM

## 2023-08-26 LAB
ALBUMIN SERPL BCP-MCNC: 3.1 G/DL (ref 3.5–5.2)
ALLENS TEST: ABNORMAL
ALP SERPL-CCNC: 91 U/L (ref 55–135)
ALT SERPL W/O P-5'-P-CCNC: 21 U/L (ref 10–44)
ANION GAP SERPL CALC-SCNC: 11 MMOL/L (ref 8–16)
AST SERPL-CCNC: 19 U/L (ref 10–40)
BACTERIA #/AREA URNS HPF: ABNORMAL /HPF
BASOPHILS # BLD AUTO: 0.04 K/UL (ref 0–0.2)
BASOPHILS NFR BLD: 0.6 % (ref 0–1.9)
BILIRUB SERPL-MCNC: 0.3 MG/DL (ref 0.1–1)
BILIRUB UR QL STRIP: NEGATIVE
BUN SERPL-MCNC: 12 MG/DL (ref 8–23)
CALCIUM SERPL-MCNC: 9.3 MG/DL (ref 8.7–10.5)
CHLORIDE SERPL-SCNC: 108 MMOL/L (ref 95–110)
CLARITY UR: ABNORMAL
CO2 SERPL-SCNC: 21 MMOL/L (ref 23–29)
COLOR UR: ABNORMAL
CREAT SERPL-MCNC: 0.7 MG/DL (ref 0.5–1.4)
DELSYS: ABNORMAL
DIFFERENTIAL METHOD: NORMAL
EOSINOPHIL # BLD AUTO: 0.2 K/UL (ref 0–0.5)
EOSINOPHIL NFR BLD: 2.4 % (ref 0–8)
ERYTHROCYTE [DISTWIDTH] IN BLOOD BY AUTOMATED COUNT: 13.2 % (ref 11.5–14.5)
EST. GFR  (NO RACE VARIABLE): >60 ML/MIN/1.73 M^2
FIO2: 21
GLUCOSE SERPL-MCNC: 108 MG/DL (ref 70–110)
GLUCOSE UR QL STRIP: NEGATIVE
HCO3 UR-SCNC: 22.3 MMOL/L (ref 24–28)
HCT VFR BLD AUTO: 41.7 % (ref 37–48.5)
HGB BLD-MCNC: 13.6 G/DL (ref 12–16)
HGB UR QL STRIP: ABNORMAL
HYALINE CASTS #/AREA URNS LPF: 0 /LPF
IMM GRANULOCYTES # BLD AUTO: 0.01 K/UL (ref 0–0.04)
IMM GRANULOCYTES NFR BLD AUTO: 0.2 % (ref 0–0.5)
INR PPP: 1 (ref 0.8–1.2)
KETONES UR QL STRIP: NEGATIVE
LACTATE SERPL-SCNC: 0.9 MMOL/L (ref 0.5–2.2)
LEUKOCYTE ESTERASE UR QL STRIP: NEGATIVE
LYMPHOCYTES # BLD AUTO: 1.4 K/UL (ref 1–4.8)
LYMPHOCYTES NFR BLD: 21.4 % (ref 18–48)
MAGNESIUM SERPL-MCNC: 2 MG/DL (ref 1.6–2.6)
MCH RBC QN AUTO: 28.8 PG (ref 27–31)
MCHC RBC AUTO-ENTMCNC: 32.6 G/DL (ref 32–36)
MCV RBC AUTO: 88 FL (ref 82–98)
MICROSCOPIC COMMENT: ABNORMAL
MODE: ABNORMAL
MONOCYTES # BLD AUTO: 0.3 K/UL (ref 0.3–1)
MONOCYTES NFR BLD: 5 % (ref 4–15)
NEUTROPHILS # BLD AUTO: 4.6 K/UL (ref 1.8–7.7)
NEUTROPHILS NFR BLD: 70.4 % (ref 38–73)
NITRITE UR QL STRIP: NEGATIVE
NRBC BLD-RTO: 0 /100 WBC
PCO2 BLDA: 36.7 MMHG (ref 35–45)
PH SMN: 7.39 [PH] (ref 7.35–7.45)
PH UR STRIP: 7 [PH] (ref 5–8)
PLATELET # BLD AUTO: 335 K/UL (ref 150–450)
PMV BLD AUTO: 9.7 FL (ref 9.2–12.9)
PO2 BLDA: 75 MMHG (ref 80–100)
POC BE: -3 MMOL/L
POC SATURATED O2: 95 % (ref 95–100)
POTASSIUM SERPL-SCNC: 3.8 MMOL/L (ref 3.5–5.1)
PROT SERPL-MCNC: 7.7 G/DL (ref 6–8.4)
PROT UR QL STRIP: ABNORMAL
PROTHROMBIN TIME: 10.3 SEC (ref 9–12.5)
RBC # BLD AUTO: 4.73 M/UL (ref 4–5.4)
RBC #/AREA URNS HPF: 1 /HPF (ref 0–4)
SAMPLE: ABNORMAL
SITE: ABNORMAL
SODIUM SERPL-SCNC: 140 MMOL/L (ref 136–145)
SP GR UR STRIP: 1.02 (ref 1–1.03)
SP02: 97
SQUAMOUS #/AREA URNS HPF: ABNORMAL /HPF
TROPONIN I SERPL DL<=0.01 NG/ML-MCNC: 0.01 NG/ML (ref 0–0.03)
URN SPEC COLLECT METH UR: ABNORMAL
UROBILINOGEN UR STRIP-ACNC: NEGATIVE EU/DL
WBC # BLD AUTO: 6.59 K/UL (ref 3.9–12.7)
WBC #/AREA URNS HPF: 0 /HPF (ref 0–5)

## 2023-08-26 PROCEDURE — 93005 ELECTROCARDIOGRAM TRACING: CPT

## 2023-08-26 PROCEDURE — 36415 COLL VENOUS BLD VENIPUNCTURE: CPT | Performed by: EMERGENCY MEDICINE

## 2023-08-26 PROCEDURE — 70450 CT HEAD/BRAIN W/O DYE: CPT | Mod: TC

## 2023-08-26 PROCEDURE — 70450 CT HEAD WITHOUT CONTRAST: ICD-10-PCS | Mod: 26,,, | Performed by: RADIOLOGY

## 2023-08-26 PROCEDURE — 82803 BLOOD GASES ANY COMBINATION: CPT

## 2023-08-26 PROCEDURE — 83605 ASSAY OF LACTIC ACID: CPT | Performed by: EMERGENCY MEDICINE

## 2023-08-26 PROCEDURE — 81000 URINALYSIS NONAUTO W/SCOPE: CPT | Performed by: EMERGENCY MEDICINE

## 2023-08-26 PROCEDURE — 71045 X-RAY EXAM CHEST 1 VIEW: CPT | Mod: 26,,, | Performed by: RADIOLOGY

## 2023-08-26 PROCEDURE — 85025 COMPLETE CBC W/AUTO DIFF WBC: CPT | Performed by: EMERGENCY MEDICINE

## 2023-08-26 PROCEDURE — 36600 WITHDRAWAL OF ARTERIAL BLOOD: CPT

## 2023-08-26 PROCEDURE — 71045 X-RAY EXAM CHEST 1 VIEW: CPT | Mod: TC

## 2023-08-26 PROCEDURE — 93010 ELECTROCARDIOGRAM REPORT: CPT | Mod: ,,, | Performed by: INTERNAL MEDICINE

## 2023-08-26 PROCEDURE — 93010 EKG 12-LEAD: ICD-10-PCS | Mod: ,,, | Performed by: INTERNAL MEDICINE

## 2023-08-26 PROCEDURE — 96360 HYDRATION IV INFUSION INIT: CPT

## 2023-08-26 PROCEDURE — 80053 COMPREHEN METABOLIC PANEL: CPT | Performed by: EMERGENCY MEDICINE

## 2023-08-26 PROCEDURE — 83735 ASSAY OF MAGNESIUM: CPT | Performed by: EMERGENCY MEDICINE

## 2023-08-26 PROCEDURE — 99285 EMERGENCY DEPT VISIT HI MDM: CPT | Mod: 25

## 2023-08-26 PROCEDURE — 85610 PROTHROMBIN TIME: CPT | Performed by: EMERGENCY MEDICINE

## 2023-08-26 PROCEDURE — 70450 CT HEAD/BRAIN W/O DYE: CPT | Mod: 26,,, | Performed by: RADIOLOGY

## 2023-08-26 PROCEDURE — 84484 ASSAY OF TROPONIN QUANT: CPT | Performed by: EMERGENCY MEDICINE

## 2023-08-26 PROCEDURE — 99900035 HC TECH TIME PER 15 MIN (STAT)

## 2023-08-26 PROCEDURE — 25000003 PHARM REV CODE 250: Performed by: EMERGENCY MEDICINE

## 2023-08-26 PROCEDURE — 80177 DRUG SCRN QUAN LEVETIRACETAM: CPT | Performed by: EMERGENCY MEDICINE

## 2023-08-26 PROCEDURE — 71045 XR CHEST AP PORTABLE: ICD-10-PCS | Mod: 26,,, | Performed by: RADIOLOGY

## 2023-08-26 RX ORDER — SODIUM CHLORIDE 9 MG/ML
1000 INJECTION, SOLUTION INTRAVENOUS
Status: COMPLETED | OUTPATIENT
Start: 2023-08-26 | End: 2023-08-26

## 2023-08-26 RX ADMIN — SODIUM CHLORIDE 1000 ML: 9 INJECTION, SOLUTION INTRAVENOUS at 12:08

## 2023-08-26 NOTE — PLAN OF CARE
Latest Reference Range & Units 08/26/23 12:58   POC PH 7.35 - 7.45  7.391   POC PCO2 35 - 45 mmHg 36.7   POC PO2 80 - 100 mmHg 75 (L)   POC HCO3 24 - 28 mmol/L 22.3 (L)   POC SATURATED O2 95 - 100 % 95   Sample  ARTERIAL   POC BE -2 to 2 mmol/L -3   FiO2  21   [facility]   Site  LR   Mode  SPONT   (L): Data is abnormally low  Results reported to Dr. Hollingsworth.   normal shape

## 2023-08-26 NOTE — ED PROVIDER NOTES
Encounter Date: 8/26/2023       History     Chief Complaint   Patient presents with    near syncope     Patient scene in ER yesterday after fall. Near syncope with altered mental this morning.      Patient is a 77-year-old female here with daughter who reports patient has had acute mental status change for about 2-1/2 hours ago.  Patient has history of stroke resulting in aphasia, right-sided weakness and chronic use of wheelchair.  Patient is normally able to transfer on her own and communicate.  Patient went down for a nap around 10:00 a.m. and the daughter heard a grunting sound and went in the room and the patient had her teeth clenched and has been unable to communicate effectively since.  Patient does have history of seizure usually maintained on Keppra.  Medics report blood glucose in the 90s and low blood pressure that has now elevated.  Patient unable to give any meaningful history.  Of note, the patient was here last night after falling backwards while seated in her wheelchair, struck the back of her head, had unremarkable head CT at that time.    The history is provided by the EMS personnel, a relative and medical records.     Review of patient's allergies indicates:   Allergen Reactions    Penicillins      Past Medical History:   Diagnosis Date    Stroke      Past Surgical History:   Procedure Laterality Date    Right Above knee amputation       No family history on file.  Social History     Tobacco Use    Smoking status: Never    Smokeless tobacco: Never   Substance Use Topics    Alcohol use: Never    Drug use: Never     Review of Systems   Unable to perform ROS: Mental status change   Neurological:         AMS, last known normal 10am   All other systems reviewed and are negative.      Physical Exam     Initial Vitals [08/26/23 1221]   BP Pulse Resp Temp SpO2   (!) 206/100 96 20 97.8 °F (36.6 °C) 97 %      MAP       --         Physical Exam    Nursing note and vitals reviewed.  Constitutional: She  appears well-developed and well-nourished.   Chronically ill-appearing elderly female in no obvious distress.   HENT:   Head: Normocephalic and atraumatic.   Dry mucous membranes   Eyes: EOM are normal.   Neck: Neck supple.   Cardiovascular:  Normal rate.           Pulmonary/Chest: No respiratory distress.   Abdominal: Abdomen is soft.   Musculoskeletal:         General: No edema.      Cervical back: Neck supple.      Comments: Right AKA     Neurological: She is alert.   Right lower facial weakness, EOMI, severe dysarthria, + mild-moderate aphasia, right upper and lower extremity hemiplegia.  No drift left upper extremity, patient able to wiggle left toes.  Right-sided weakness chronic, patient normally more animated and quick to respond, per daughter.   Skin: Skin is warm and dry.   Psychiatric: She has a normal mood and affect.         ED Course   Critical Care    Date/Time: 8/26/2023 1:38 PM    Performed by: Fernando Hollingsworth MD  Authorized by: Fernando Hollingsworth MD  Total critical care time (exclusive of procedural time) : 0 minutes  Comments: 33 minutes of total critical care time between bedside care, medication selection, medication response evaluation and data interpretation.          Labs Reviewed   COMPREHENSIVE METABOLIC PANEL - Abnormal; Notable for the following components:       Result Value    CO2 21 (*)     Albumin 3.1 (*)     All other components within normal limits   URINALYSIS, REFLEX TO URINE CULTURE - Abnormal; Notable for the following components:    Protein, UA 3+ (*)     Occult Blood UA Trace (*)     All other components within normal limits    Narrative:     Preferred Collection Type->Urine, Clean Catch  Specimen Source->Urine   URINALYSIS MICROSCOPIC - Abnormal; Notable for the following components:    Bacteria Few (*)     All other components within normal limits    Narrative:     Preferred Collection Type->Urine, Clean Catch  Specimen Source->Urine   ISTAT PROCEDURE - Abnormal;  Notable for the following components:    POC PO2 75 (*)     POC HCO3 22.3 (*)     All other components within normal limits   CBC W/ AUTO DIFFERENTIAL   LACTIC ACID, PLASMA   MAGNESIUM   PROTIME-INR   TROPONIN I   LEVETIRACETAM  (KEPPRA) LEVEL     EKG Readings: (Independently Interpreted)   12:55 EKG #1 sinus rhythm at 91 beats per minute, normal WV, normal QT, +PVCs, no adal ST elevation       Imaging Results              CT Head Without Contrast (Final result)  Result time 08/26/23 12:54:52      Final result by Bernarda Aranda MD (08/26/23 12:54:52)                   Impression:      There is evidence of a prior large left MCA infarct and a right scalp hematoma.  These findings appears similar to prior exam.      Electronically signed by: Bernarda Aranda MD  Date:    08/26/2023  Time:    12:54               Narrative:    EXAMINATION:  CT HEAD WITHOUT CONTRAST    CLINICAL HISTORY:  Mental status change, unknown cause;    TECHNIQUE:  Low dose axial images were obtained through the head.  Coronal and sagittal reformations were also performed. Contrast was not administered.    COMPARISON:  02/25/2023    FINDINGS:  Again seen is evidence of a prior left MCA infarct with a large region of encephalomalacia.  There is no evidence acute intracranial hemorrhage.  The calvarium is intact.  There is a posterior scalp hematoma on the right.                                       X-Ray Chest AP Portable (Final result)  Result time 08/26/23 12:49:32      Final result by Bernarda Aranda MD (08/26/23 12:49:32)                   Impression:      No acute abnormality.      Electronically signed by: Bernarda Aranda MD  Date:    08/26/2023  Time:    12:49               Narrative:    EXAMINATION:  XR CHEST AP PORTABLE    CLINICAL HISTORY:  AMS;    TECHNIQUE:  Single frontal view of the chest was performed.    COMPARISON:  08/22/2023    FINDINGS:  The lungs are clear, with normal appearance of pulmonary vasculature and no pleural  effusion or pneumothorax.    The cardiac silhouette is normal in size. The hilar and mediastinal contours are unremarkable.    Bones are intact.                                       Medications   0.9%  NaCl infusion (1,000 mLs Intravenous New Bag 8/26/23 1239)     Medical Decision Making  Amount and/or Complexity of Data Reviewed  Labs: ordered.  Radiology: ordered.    Risk  Prescription drug management.               ED Course as of 08/26/23 1345   Sat Aug 26, 2023   1256 Patient much more alert.  I think patient probably had seizure.  Stroke alert has not been called. [RJ]   1311 Patient back to baseline.  We will give extra dose of IV Keppra and discharge home. [RJ]   1336 Patient back to baseline.  Workup encouraging.  Patient's last seizure was about 4 years ago.  Patient most likely had seizure secondary to recent head trauma.  Had long discussion with patient and family and we will not change any medications today.  Patient to follow up with her Neurologist beginning of next week. [RJ]      ED Course User Index  [RJ] Fernando Hollingsworth MD                    Clinical Impression:   Final diagnoses:  [R41.82] AMS (altered mental status)  [R56.1] Seizure after head injury (Primary)        ED Disposition Condition    Discharge Stable          ED Prescriptions    None       Follow-up Information       Follow up With Specialties Details Why Contact Info    [facility] Emergency Dept Emergency Medicine  As needed [address] [facility] [facility] [address] [phone]    Estee Fierro MD Family Medicine Schedule an appointment as soon as possible for a visit   [address] [facility] [id number] [phone]      Your Neurologist                 Fernando Hollingsworth MD  08/26/23 1337       Fernando Hollingsworth MD  08/26/23 1349

## 2023-08-26 NOTE — ED PROVIDER NOTES
Encounter Date: 8/25/2023       History     Chief Complaint   Patient presents with    Fall     Pt brought in by daughter for c/o fall. Pt was in wheelchair which flipped back causing pt to hit back of head. Denies LOC. Pt takes aspirin.      77-year-old female who was in a wheelchair that flipped backwards and hit the back of her head.  She is complaining of head and neck pain.  She denies any other pain or injury.  Pain is moderate intensity.  Denies loss of consciousness or vomiting. Hx of CVA with right sided hemiplegia and expressive aphasia.        Review of patient's allergies indicates:   Allergen Reactions    Penicillins      Past Medical History:   Diagnosis Date    Stroke      Past Surgical History:   Procedure Laterality Date    Right Above knee amputation       History reviewed. No pertinent family history.  Social History     Tobacco Use    Smoking status: Never    Smokeless tobacco: Never   Substance Use Topics    Alcohol use: Never    Drug use: Never     Review of Systems   Constitutional:  Negative for fever.   HENT:  Negative for sore throat.    Respiratory:  Negative for shortness of breath.    Cardiovascular:  Negative for chest pain.   Gastrointestinal:  Negative for nausea.   Genitourinary:  Negative for dysuria.   Musculoskeletal:  Positive for neck pain. Negative for back pain.   Skin:  Negative for rash.   Neurological:  Positive for headaches. Negative for syncope and weakness.   Hematological:  Does not bruise/bleed easily.       Physical Exam     Initial Vitals [08/25/23 1920]   BP Pulse Resp Temp SpO2   (!) 173/82 93 18 98.5 °F (36.9 °C) 97 %      MAP       --         Physical Exam    Nursing note and vitals reviewed.  Constitutional: She appears well-developed and well-nourished.   HENT:   Head: Normocephalic and atraumatic.   Eyes: Conjunctivae and EOM are normal. Pupils are equal, round, and reactive to light.   Contusion to posterior scalp   Neck: Neck supple.   Normal range of  motion.  Cardiovascular:  Normal rate and regular rhythm.           Pulmonary/Chest: Breath sounds normal. No respiratory distress. She has no wheezes.   Abdominal: Abdomen is soft. Bowel sounds are normal. There is no abdominal tenderness. There is no rebound and no guarding.   Musculoskeletal:         General: Tenderness present.      Cervical back: Normal range of motion and neck supple.      Comments: Posterior neck tenderness, right sided hemiplegia from CVA     Neurological: She is alert and oriented to person, place, and time. She has normal strength. No cranial nerve deficit.   Skin: Skin is warm and dry.   Psychiatric: She has a normal mood and affect.         ED Course   Procedures  Labs Reviewed - No data to display       Imaging Results              CT Head Without Contrast (In process)                      CT Cervical Spine Without Contrast (In process)                      Medications - No data to display  Medical Decision Making  77-year-old female who fell out of a wheelchair and hit the back of her scalp.  She is complaining of head and neck pain.    CT head shows no acute intracranial abnormality.  CT neck shows a age indeterminate partial view of a T3 compression fracture.  Prescription for Norco p.r.n. for pain instructed with    Amount and/or Complexity of Data Reviewed  Radiology: ordered.    Risk  Prescription drug management.      Additional MDM:   Differential Diagnosis:   Head injury, contusion, fracture, sprain, strain                             Clinical Impression:   Final diagnoses:  [S09.90XA] Injury of head, initial encounter (Primary)  [M54.2] Neck pain, acute  [W19.XXXA] Fall, initial encounter        ED Disposition Condition    Discharge Stable          ED Prescriptions       Medication Sig Dispense Start Date End Date Auth. Provider    HYDROcodone-acetaminophen (NORCO) 5-325 mg per tablet Take 1 tablet by mouth every 6 (six) hours as needed for Pain. 10 tablet 8/25/2023 -- Nirmala  VIKTORIYA Ratliff          Follow-up Information       Follow up With Specialties Details Why Contact Info    Estee Fierro MD Family Medicine Call   [address] [facility] [place] [address] [phone]               Evy Silvestre NP  08/25/23 2023

## 2023-08-30 LAB — LEVETIRACETAM SERPL-MCNC: 35.9 UG/ML (ref 3–60)

## 2023-09-07 ENCOUNTER — PATIENT MESSAGE (OUTPATIENT)
Dept: FAMILY MEDICINE | Facility: CLINIC | Age: 78
End: 2023-09-07
Payer: MEDICARE

## 2023-10-18 DIAGNOSIS — Z78.0 MENOPAUSE: ICD-10-CM

## 2023-11-01 RX ORDER — DULOXETIN HYDROCHLORIDE 20 MG/1
20 CAPSULE, DELAYED RELEASE ORAL NIGHTLY
Qty: 90 CAPSULE | Refills: 2 | Status: SHIPPED | OUTPATIENT
Start: 2023-11-01

## 2023-11-01 NOTE — TELEPHONE ENCOUNTER
No care due was identified.  [facility] Embedded Care Due Messages. Reference number: [id number].   11/01/2023 12:15:25 AM CDT

## 2024-01-24 ENCOUNTER — PATIENT MESSAGE (OUTPATIENT)
Dept: FAMILY MEDICINE | Facility: CLINIC | Age: 79
End: 2024-01-24
Payer: MEDICARE

## 2024-01-26 ENCOUNTER — OFFICE VISIT (OUTPATIENT)
Dept: FAMILY MEDICINE | Facility: CLINIC | Age: 79
End: 2024-01-26
Payer: MEDICARE

## 2024-01-26 ENCOUNTER — OFFICE VISIT (OUTPATIENT)
Dept: OTOLARYNGOLOGY | Facility: CLINIC | Age: 79
End: 2024-01-26
Payer: MEDICARE

## 2024-01-26 VITALS
OXYGEN SATURATION: 97 % | BODY MASS INDEX: 19.79 KG/M2 | DIASTOLIC BLOOD PRESSURE: 82 MMHG | HEART RATE: 88 BPM | RESPIRATION RATE: 16 BRPM | SYSTOLIC BLOOD PRESSURE: 160 MMHG | WEIGHT: 107.56 LBS | HEIGHT: 62 IN

## 2024-01-26 VITALS — WEIGHT: 107 LBS | HEIGHT: 62 IN | BODY MASS INDEX: 19.69 KG/M2

## 2024-01-26 DIAGNOSIS — Z86.73 H/O: CVA (CEREBROVASCULAR ACCIDENT): ICD-10-CM

## 2024-01-26 DIAGNOSIS — H93.8X1 SENSATION OF FULLNESS IN RIGHT EAR: ICD-10-CM

## 2024-01-26 DIAGNOSIS — F41.8 ANXIETY WITH DEPRESSION: ICD-10-CM

## 2024-01-26 DIAGNOSIS — S06.0X9S CONCUSSION WITH LOSS OF CONSCIOUSNESS, SEQUELA: ICD-10-CM

## 2024-01-26 DIAGNOSIS — R56.1 SEIZURE AFTER HEAD INJURY: ICD-10-CM

## 2024-01-26 DIAGNOSIS — R13.10 DYSPHAGIA, UNSPECIFIED TYPE: ICD-10-CM

## 2024-01-26 DIAGNOSIS — R56.9 SEIZURES: ICD-10-CM

## 2024-01-26 DIAGNOSIS — H91.90 HEARING LOSS, UNSPECIFIED HEARING LOSS TYPE, UNSPECIFIED LATERALITY: Primary | ICD-10-CM

## 2024-01-26 DIAGNOSIS — H93.8X1 SENSATION OF FULLNESS IN RIGHT EAR: Primary | ICD-10-CM

## 2024-01-26 DIAGNOSIS — R25.2 LEG CRAMPS: ICD-10-CM

## 2024-01-26 DIAGNOSIS — I69.320 APHASIA AS LATE EFFECT OF CEREBROVASCULAR ACCIDENT: ICD-10-CM

## 2024-01-26 DIAGNOSIS — B37.2 CANDIDAL INTERTRIGO: ICD-10-CM

## 2024-01-26 DIAGNOSIS — Z89.611 HX OF AKA (ABOVE KNEE AMPUTATION), RIGHT: ICD-10-CM

## 2024-01-26 DIAGNOSIS — I70.0 AORTIC ATHEROSCLEROSIS: ICD-10-CM

## 2024-01-26 PROCEDURE — 99999 PR PBB SHADOW E&M-EST. PATIENT-LVL IV: CPT | Mod: PBBFAC,,, | Performed by: STUDENT IN AN ORGANIZED HEALTH CARE EDUCATION/TRAINING PROGRAM

## 2024-01-26 PROCEDURE — 99214 OFFICE O/P EST MOD 30 MIN: CPT | Mod: PBBFAC,PN | Performed by: STUDENT IN AN ORGANIZED HEALTH CARE EDUCATION/TRAINING PROGRAM

## 2024-01-26 PROCEDURE — 99214 OFFICE O/P EST MOD 30 MIN: CPT | Mod: S$PBB,,, | Performed by: STUDENT IN AN ORGANIZED HEALTH CARE EDUCATION/TRAINING PROGRAM

## 2024-01-26 PROCEDURE — 99203 OFFICE O/P NEW LOW 30 MIN: CPT | Mod: S$PBB,,, | Performed by: OTOLARYNGOLOGY

## 2024-01-26 PROCEDURE — 99213 OFFICE O/P EST LOW 20 MIN: CPT | Mod: PBBFAC,27,PN,25 | Performed by: OTOLARYNGOLOGY

## 2024-01-26 PROCEDURE — 99999 PR PBB SHADOW E&M-EST. PATIENT-LVL III: CPT | Mod: PBBFAC,,, | Performed by: OTOLARYNGOLOGY

## 2024-01-26 RX ORDER — KETOCONAZOLE
POWDER (GRAM) MISCELLANEOUS
Qty: 25000 G | Refills: 11 | Status: SHIPPED | OUTPATIENT
Start: 2024-01-26 | End: 2024-04-23

## 2024-01-26 RX ORDER — FLUCONAZOLE 150 MG/1
150 TABLET ORAL DAILY
Qty: 2 TABLET | Refills: 1 | Status: SHIPPED | OUTPATIENT
Start: 2024-01-26 | End: 2024-04-23

## 2024-01-26 NOTE — PROGRESS NOTES
Subjective:       Patient ID: Meaghan Pan is a 78 y.o. female.    Chief Complaint: Cerumen Impaction (Pt c/o wax build up )      This 78-year-old patient who has had a CVA has a aphasia comes in with her daughter who she lives with.  They come in because there is a presumption of some wax on the left side she does have significant hearing loss in problems communicating but that has never been checked.  She also has a history of very wet coughing and a history of smoking and as we are discussing this she is certainly has junky wet coughing coming from her lungs.  That has a long-term issue and they are not sure to what extent it may need to be addressed.          Objective:      ENT Physical Exam    So her right ear looks fine there was no significant wax buildup.  Left ear does have a bit of wax that I was able to remove at the meatus and there has a little bit deeper than that but she has not tolerant of me removing it, it has not against the eardrum, it has not obstructing the ear canal.  I do not think that is part of this problem and I am reluctant to try to dig in her ear to get the last bit out which I do not think that is problematic and she has not tolerant of me trying that because she has a sensitive ear canal apparently    Her nasal exam looks pretty good in her oropharynx does not show any posterior pharyngeal banding or infection although it looks a little dry in her mouth        Assessment:       1. Hearing loss, unspecified hearing loss type, unspecified laterality    2. Sensation of fullness in right ear         Plan:          So I do not see enough wax to be an explanation for her problem of ear fullness on the right.  she needs an audiogram and that is scheduled for next Wednesday and hopefully I will be available to discuss the findings with her at that time

## 2024-01-26 NOTE — ASSESSMENT & PLAN NOTE
Working on ways to apply the powder without help to prevent worsening when her daughter is out of town

## 2024-01-26 NOTE — ASSESSMENT & PLAN NOTE
Known aspiration. Declines thickener.  Currently working with speech therapy and doing better  Always has issues with congestion from this.    Stable. Continue current medications and regular followup.  Continue risk factor management

## 2024-01-26 NOTE — PROGRESS NOTES
Subjective:       Patient ID: Meaghan Pan is a 78 y.o. female.    Chief Complaint: Ear Injury, Vaginitis, and Chest Congestion    Patient Active Problem List:     Tobacco abuse     H/O: CVA (cerebrovascular accident)     Dysphagia     Difficulty mobilizing in home     Chronic right shoulder pain     Aphasia as late effect of cerebrovascular accident     Anxiety with depression     Leg cramps     Seizures     Hx of AKA (above knee amputation), right     Aortic atherosclerosis     Candidal intertrigo    Current Outpatient Medications:  aspirin (ECOTRIN) 325 MG EC tablet, Take 325 mg by mouth.  baclofen (LIORESAL) 5 mg Tab tablet, Take 5 mg by mouth as needed.  cyclobenzaprine (FLEXERIL) 10 MG tablet, Take 10 mg by mouth 3 (three) times daily as needed for Muscle spasms.  DULoxetine (CYMBALTA) 20 MG capsule, TAKE 1 CAPSULE BY MOUTH NIGHTLY.  guaiFENesin (MUCINEX) 600 mg 12 hr tablet, Take 1,200 mg by mouth 3 (three) times daily.  ibuprofen (ADVIL,MOTRIN) 400 MG tablet, Take 400 mg by mouth every 6 (six) hours as needed.  ketorolac (TORADOL) 10 mg tablet, Take 10 mg by mouth every 6 (six) hours.  Lactobacillus acidophilus 500 million cell Cap, Take by mouth once daily.  levETIRAcetam (KEPPRA) 250 MG Tab, Take 0.5 tablets by mouth 2 (two) times a day. Once in the morning with 500mg dose Once at midday  levETIRAcetam (KEPPRA) 500 MG Tab, Take by mouth 2 (two) times daily.  azithromycin (Z-MURTAZA) 250 MG tablet, Take 2 tablets by mouth on day 1; Take 1 tablet by mouth on days 2-5 (Patient not taking: Reported on 1/26/2024)  HYDROcodone-acetaminophen (NORCO) 5-325 mg per tablet, Take 1 tablet by mouth every 6 (six) hours as needed for Pain. (Patient not taking: Reported on 1/26/2024)  ketoconazole (NIZORAL) 2 % cream, Apply topically once daily. (Patient not taking: Reported on 1/26/2024)    No current facility-administered medications for this visit.    She continues to have eustacian tube dysfunction with difficulty  hearing and with wax in her ear and would like this managed.  She continues to have some issue with the rash and putting on the powder  She has been working with speech therapy to improve dysphagia and aspiration.  She still has some congestion but this is chronic and ongoing.  She did have a spell where she fell in her wheelchair and hit her head  She sustained a concussion and then had a bradycardic episode with loss of pulse for a brief time and required hospitilization.  She has mostly recovered from this other than some intermittent dizziness.        Review of Systems   Constitutional:  Negative for activity change and appetite change.   Respiratory:  Negative for shortness of breath.    Cardiovascular:  Negative for chest pain.   Gastrointestinal:  Negative for abdominal pain and anal bleeding.   Genitourinary:  Negative for dysuria.   Integumentary:  Positive for rash.   Neurological:  Positive for light-headedness.   Psychiatric/Behavioral:  Negative for dysphoric mood and sleep disturbance. The patient is not nervous/anxious.          Objective:      Physical Exam  Constitutional:       General: She is not in acute distress.     Appearance: Normal appearance. She is not ill-appearing.   Eyes:      Conjunctiva/sclera: Conjunctivae normal.   Cardiovascular:      Rate and Rhythm: Normal rate and regular rhythm.      Pulses: Normal pulses.      Heart sounds: Normal heart sounds. No murmur heard.  Pulmonary:      Effort: Pulmonary effort is normal. No respiratory distress.      Breath sounds: Rhonchi present. No wheezing or rales.   Musculoskeletal:      Comments: Right aka  Right contracture   Skin:     General: Skin is warm and dry.      Findings: No rash.   Neurological:      Mental Status: She is alert. Mental status is at baseline.      Gait: Gait abnormal (in a wheelchair).      Comments: Aphasia after CVA   Psychiatric:         Mood and Affect: Mood normal.         Behavior: Behavior normal.          Thought Content: Thought content normal.         Judgment: Judgment normal.      Comments: aphasia         Assessment:       1. Sensation of fullness in right ear    2. Aortic atherosclerosis    3. Seizure after head injury    4. Hx of AKA (above knee amputation), right    5. Concussion with loss of consciousness, sequela    6. Candidal intertrigo    7. H/O: CVA (cerebrovascular accident)    8. Aphasia as late effect of cerebrovascular accident    9. Seizures    10. Anxiety with depression    11. Dysphagia, unspecified type    12. Leg cramps        Plan:       Problem List Items Addressed This Visit          Neuro    H/O: CVA (cerebrovascular accident)     Known aspiration. Declines thickener.  Currently working with speech therapy and doing better  Always has issues with congestion from this.    Stable. Continue current medications and regular followup.  Continue risk factor management           Aphasia as late effect of cerebrovascular accident     Unchanged. stable         Seizures     No recent seizures.   Stable. Continue current medications and regular followup.              Psychiatric    Anxiety with depression     Stable. Continue current medications and regular followup.              Derm    Candidal intertrigo     Working on ways to apply the powder without help to prevent worsening when her daughter is out of town         Relevant Medications    ketoconazole, bulk, Powd    fluconazole (DIFLUCAN) 150 MG Tab       Cardiac/Vascular    Aortic atherosclerosis     Stable. Continue current medications and regular followup.  Continue risk factor management              GI    Dysphagia     Stable. Continue regular followup.  Working with speech therapy            Orthopedic    Hx of AKA (above knee amputation), right     Stable. Unchanged. In a wheelchair            Other    Leg cramps     Overall doing much better.            Other Visit Diagnoses       Sensation of fullness in right ear    -  Primary     Relevant Orders    Ambulatory referral/consult to ENT    Seizure after head injury        no further seizures.    Concussion with loss of consciousness, sequela        intermittent dizziness but overall much better

## 2024-02-07 ENCOUNTER — OFFICE VISIT (OUTPATIENT)
Dept: URGENT CARE | Facility: CLINIC | Age: 79
End: 2024-02-07
Payer: MEDICARE

## 2024-02-07 VITALS
HEIGHT: 62 IN | BODY MASS INDEX: 19.69 KG/M2 | WEIGHT: 107 LBS | RESPIRATION RATE: 21 BRPM | DIASTOLIC BLOOD PRESSURE: 71 MMHG | TEMPERATURE: 98 F | HEART RATE: 89 BPM | SYSTOLIC BLOOD PRESSURE: 150 MMHG | OXYGEN SATURATION: 96 %

## 2024-02-07 DIAGNOSIS — J32.9 BACTERIAL SINUSITIS: Primary | ICD-10-CM

## 2024-02-07 DIAGNOSIS — B96.89 BACTERIAL SINUSITIS: Primary | ICD-10-CM

## 2024-02-07 PROCEDURE — 99213 OFFICE O/P EST LOW 20 MIN: CPT | Mod: S$GLB,,, | Performed by: NURSE PRACTITIONER

## 2024-02-07 RX ORDER — AZITHROMYCIN 250 MG/1
TABLET, FILM COATED ORAL
Qty: 6 TABLET | Refills: 0 | Status: SHIPPED | OUTPATIENT
Start: 2024-02-07 | End: 2024-04-23

## 2024-02-07 NOTE — PROGRESS NOTES
"Subjective:       Patient ID: Meaghan Pan is a 78 y.o. female.    Vitals:  height is 5' 2" (1.575 m) and weight is 48.5 kg (107 lb). Her oral temperature is 97.6 °F (36.4 °C). Her blood pressure is 150/71 (abnormal) and her pulse is 89. Her respiration is 21 (abnormal) and oxygen saturation is 96%.     Chief Complaint: Sinus Problem (Started today with what caregiver believes she has sinus pain.)    This is a 78 y.o. female who presents today with a chief complaint of Started today with what caregiver believes she has sinus pain.  Patient presents with:  Sinus Problem: Started today with what caregiver believes she has sinus pain.         Sinus Problem  This is a new problem. The current episode started today. Associated symptoms include headaches. Past treatments include nothing. The treatment provided no relief.       Neurological:  Positive for headaches.           Objective:      Physical Exam   Constitutional: She is oriented to person, place, and time. She appears well-developed.   HENT:   Head: Normocephalic and atraumatic.   Ears:   Right Ear: External ear normal.   Left Ear: External ear normal.   Nose: Rhinorrhea present.   Mouth/Throat: Mucous membranes are normal.   Eyes: Conjunctivae and lids are normal.   Neck: Trachea normal. Neck supple.   Cardiovascular: Normal rate, regular rhythm and normal heart sounds.   Pulmonary/Chest: Effort normal and breath sounds normal. No respiratory distress.   Abdominal: Normal appearance and bowel sounds are normal. She exhibits no distension and no mass. Soft. There is no abdominal tenderness.   Musculoskeletal: Normal range of motion.         General: Normal range of motion.   Neurological: She is alert and oriented to person, place, and time. She has normal strength.   Skin: Skin is warm, dry, intact, not diaphoretic and not pale.   Psychiatric: Her speech is normal and behavior is normal. Judgment and thought content normal.   Nursing note and vitals " reviewed.        Past medical history and current medications reviewed.     No distress.  Assessment:           1. Bacterial sinusitis              Plan:         Bacterial sinusitis  -     azithromycin (Z-MURTAZA) 250 MG tablet; Take 2 tablets by mouth on day 1; Take 1 tablet by mouth on days 2-5  Dispense: 6 tablet; Refill: 0             Patient Instructions     You must understand that you've received an Urgent Care treatment only and that you may be released before all your medical problems are known or treated. You, the patient, will arrange for follow up care as instructed.  Follow up with your PCP or specialty clinic as directed in the next 1-2 weeks if not improved or as needed.  You can call [phone] to schedule an appointment with the appropriate provider.  If your condition worsens we recommend that you receive another evaluation at the emergency room immediately or contact your primary medical clinics after hours call service to discuss your concerns.  Please return here or go to the Emergency Department for any concerns or worsening of condition.  Please if you smoke please consider quitting. [facility] Smoke cessation hotline number is [phone], available at this number is free counseling and medications to live a healthier life!       If you were prescribed a narcotic or controlled medication, do not drive or operate heavy equipment or machinery while taking these medications.    If you were not prescribed an antibiotic and your not better please return for a recheck. Antibiotic therapy is not always indicated initially.   Please attempt over the counter medications, give it time and try Echinacea, Zinc and Vitamin C to fight common colds and virus.

## 2024-02-07 NOTE — PATIENT INSTRUCTIONS
You must understand that you've received an Urgent Care treatment only and that you may be released before all your medical problems are known or treated. You, the patient, will arrange for follow up care as instructed.  Follow up with your PCP or specialty clinic as directed in the next 1-2 weeks if not improved or as needed.  You can call [phone] to schedule an appointment with the appropriate provider.  If your condition worsens we recommend that you receive another evaluation at the emergency room immediately or contact your primary medical clinics after hours call service to discuss your concerns.  Please return here or go to the Emergency Department for any concerns or worsening of condition.  Please if you smoke please consider quitting. [facility] Smoke cessation hotline number is [phone], available at this number is free counseling and medications to live a healthier life!       If you were prescribed a narcotic or controlled medication, do not drive or operate heavy equipment or machinery while taking these medications.    If you were not prescribed an antibiotic and your not better please return for a recheck. Antibiotic therapy is not always indicated initially.   Please attempt over the counter medications, give it time and try Echinacea, Zinc and Vitamin C to fight common colds and virus.

## 2024-02-21 ENCOUNTER — PATIENT MESSAGE (OUTPATIENT)
Dept: FAMILY MEDICINE | Facility: CLINIC | Age: 79
End: 2024-02-21
Payer: MEDICARE

## 2024-04-23 ENCOUNTER — OFFICE VISIT (OUTPATIENT)
Dept: FAMILY MEDICINE | Facility: CLINIC | Age: 79
End: 2024-04-23
Payer: MEDICARE

## 2024-04-23 VITALS
RESPIRATION RATE: 16 BRPM | BODY MASS INDEX: 19.57 KG/M2 | HEIGHT: 62 IN | OXYGEN SATURATION: 95 % | SYSTOLIC BLOOD PRESSURE: 130 MMHG | DIASTOLIC BLOOD PRESSURE: 79 MMHG | HEART RATE: 85 BPM

## 2024-04-23 DIAGNOSIS — Z13.6 ENCOUNTER FOR LIPID SCREENING FOR CARDIOVASCULAR DISEASE: Primary | ICD-10-CM

## 2024-04-23 DIAGNOSIS — R25.2 LEG CRAMPS: ICD-10-CM

## 2024-04-23 DIAGNOSIS — B37.2 CANDIDAL INTERTRIGO: ICD-10-CM

## 2024-04-23 DIAGNOSIS — R30.0 DYSURIA: ICD-10-CM

## 2024-04-23 DIAGNOSIS — I69.320 APHASIA AS LATE EFFECT OF CEREBROVASCULAR ACCIDENT: ICD-10-CM

## 2024-04-23 DIAGNOSIS — Z13.220 ENCOUNTER FOR LIPID SCREENING FOR CARDIOVASCULAR DISEASE: Primary | ICD-10-CM

## 2024-04-23 DIAGNOSIS — R56.9 SEIZURES: ICD-10-CM

## 2024-04-23 DIAGNOSIS — F41.8 ANXIETY WITH DEPRESSION: ICD-10-CM

## 2024-04-23 DIAGNOSIS — Z86.73 H/O: CVA (CEREBROVASCULAR ACCIDENT): ICD-10-CM

## 2024-04-23 DIAGNOSIS — Z11.59 ENCOUNTER FOR HEPATITIS C SCREENING TEST FOR LOW RISK PATIENT: ICD-10-CM

## 2024-04-23 PROCEDURE — 99999 PR PBB SHADOW E&M-EST. PATIENT-LVL III: CPT | Mod: PBBFAC,,, | Performed by: STUDENT IN AN ORGANIZED HEALTH CARE EDUCATION/TRAINING PROGRAM

## 2024-04-23 PROCEDURE — 99213 OFFICE O/P EST LOW 20 MIN: CPT | Mod: PBBFAC,PN | Performed by: STUDENT IN AN ORGANIZED HEALTH CARE EDUCATION/TRAINING PROGRAM

## 2024-04-23 PROCEDURE — 99214 OFFICE O/P EST MOD 30 MIN: CPT | Mod: S$PBB,,, | Performed by: STUDENT IN AN ORGANIZED HEALTH CARE EDUCATION/TRAINING PROGRAM

## 2024-04-23 RX ORDER — NITROFURANTOIN 25; 75 MG/1; MG/1
100 CAPSULE ORAL 2 TIMES DAILY
Qty: 20 CAPSULE | Refills: 0 | Status: SHIPPED | OUTPATIENT
Start: 2024-04-23 | End: 2024-05-17

## 2024-04-23 NOTE — PROGRESS NOTES
Subjective:       Patient ID: Meaghan Pan is a 78 y.o. female.    Chief Complaint: Follow-up (3 month) and ear infection (Possible ear infection. Pt is having cramping in leg)    Patient Active Problem List:     Tobacco abuse     H/O: CVA (cerebrovascular accident)     Dysphagia     Difficulty mobilizing in home     Chronic right shoulder pain     Aphasia as late effect of cerebrovascular accident     Anxiety with depression     Leg cramps     Seizures     Hx of AKA (above knee amputation), right     Aortic atherosclerosis     Candidal intertrigo     Hearing loss    Current Outpatient Medications:  aspirin (ECOTRIN) 325 MG EC tablet, Take 325 mg by mouth., Disp: , Rfl:   baclofen (LIORESAL) 5 mg Tab tablet, Take 5 mg by mouth as needed., Disp: , Rfl:   cyclobenzaprine (FLEXERIL) 10 MG tablet, Take 10 mg by mouth 3 (three) times daily as needed for Muscle spasms., Disp: , Rfl:   DULoxetine (CYMBALTA) 20 MG capsule, TAKE 1 CAPSULE BY MOUTH NIGHTLY., Disp: 90 capsule, Rfl: 2  guaiFENesin (MUCINEX) 600 mg 12 hr tablet, Take 1,200 mg by mouth 3 (three) times daily., Disp: , Rfl:   ibuprofen (ADVIL,MOTRIN) 400 MG tablet, Take 400 mg by mouth every 6 (six) hours as needed., Disp: , Rfl:   Lactobacillus acidophilus 500 million cell Cap, Take by mouth once daily., Disp: , Rfl:   levETIRAcetam (KEPPRA) 250 MG Tab, Take 0.5 tablets by mouth 2 (two) times a day. Once in the morning with 500mg dose Once at midday, Disp: , Rfl:   levETIRAcetam (KEPPRA) 500 MG Tab, Take by mouth 2 (two) times daily., Disp: , Rfl:   nitrofurantoin, macrocrystal-monohydrate, (MACROBID) 100 MG capsule, Take 1 capsule (100 mg total) by mouth 2 (two) times daily. for 10 days, Disp: 20 capsule, Rfl: 0    No current facility-administered medications for this visit.      She has been doing really well  Mood doing well. No seizures.  She has had more cramping in her amputation leg.  This happens when she gets an infection  Feeling it might be a uti due  to some dysuria. No fevers.      Review of Systems   Constitutional:  Negative for activity change and appetite change.   Respiratory:  Negative for shortness of breath.    Cardiovascular:  Negative for chest pain.   Gastrointestinal:  Negative for abdominal pain.   Genitourinary:  Positive for dysuria.   Integumentary:  Negative for rash.   Neurological:  Negative for seizures and syncope.   Psychiatric/Behavioral:  Negative for dysphoric mood and sleep disturbance. The patient is not nervous/anxious.          Health Maintenance Due   Topic Date Due    Hepatitis C Screening  Never done    TETANUS VACCINE  Never done    DEXA Scan  Never done    Shingles Vaccine (1 of 2) Never done    RSV Vaccine (Age 60+ and Pregnant patients) (1 - 1-dose 60+ series) Never done    Pneumococcal Vaccines (Age 65+) (1 of 1 - PCV) Never done    Lipid Panel  01/30/2023    Influenza Vaccine (1) 09/01/2023    COVID-19 Vaccine (3 - 2023-24 season) 09/01/2023      Health Maintenance reviewed and discussed- dexa ordered, labs ordered.   Objective:      Physical Exam  Constitutional:       General: She is not in acute distress.     Appearance: Normal appearance. She is not ill-appearing.   HENT:      Right Ear: Tympanic membrane, ear canal and external ear normal. There is no impacted cerumen.      Left Ear: Tympanic membrane, ear canal and external ear normal. There is no impacted cerumen.   Eyes:      Conjunctiva/sclera: Conjunctivae normal.   Cardiovascular:      Rate and Rhythm: Normal rate and regular rhythm.      Pulses: Normal pulses.      Heart sounds: Normal heart sounds. No murmur heard.  Pulmonary:      Effort: Pulmonary effort is normal. No respiratory distress.      Breath sounds: No wheezing, rhonchi or rales.   Musculoskeletal:      Left lower leg: No edema.      Comments: Right aka  Right contracture   Skin:     General: Skin is warm and dry.      Findings: No rash.   Neurological:      Mental Status: She is alert. Mental  status is at baseline.      Gait: Gait abnormal (in a wheelchair).      Comments: Aphasia after CVA   Psychiatric:         Mood and Affect: Mood normal.         Behavior: Behavior normal.         Thought Content: Thought content normal.         Judgment: Judgment normal.      Comments: aphasia         Assessment:       1. Encounter for lipid screening for cardiovascular disease    2. Encounter for hepatitis C screening test for low risk patient    3. H/O: CVA (cerebrovascular accident)    4. Aphasia as late effect of cerebrovascular accident    5. Seizures    6. Anxiety with depression    7. Candidal intertrigo    8. Leg cramps    9. Dysuria        Plan:       1. Encounter for lipid screening for cardiovascular disease  -     Lipid panel; Future; Expected date: 04/23/2024    2. Encounter for hepatitis C screening test for low risk patient  -     Hepatitis C antibody; Future; Expected date: 04/23/2024    3. H/O: CVA (cerebrovascular accident)  Overview:  Last Assessment & Plan:   Formatting of this note might be different from the original.  Continue her aspirin.    Assessment & Plan:  Stable. Continue current medications and regular followup.        4. Aphasia as late effect of cerebrovascular accident  Assessment & Plan:  Stable. Continue current medications and regular followup.        5. Seizures  Assessment & Plan:  Stable with no seizures lately      6. Anxiety with depression  Assessment & Plan:  Stable. Continue current medications and regular followup.        7. Candidal intertrigo  Assessment & Plan:  Resolved with desitin      8. Leg cramps  Assessment & Plan:  Worse with infections. Having some dysuria.  Cannot get urine due to positioning from past cva.  Treat as uti      9. Dysuria  Comments:  cannot get urine. treat empirically.  cipro may lower seizure threshold.  macrobid and monitor  Orders:  -     nitrofurantoin, macrocrystal-monohydrate, (MACROBID) 100 MG capsule; Take 1 capsule (100 mg total) by  mouth 2 (two) times daily. for 10 days  Dispense: 20 capsule; Refill: 0

## 2024-04-23 NOTE — ASSESSMENT & PLAN NOTE
Worse with infections. Having some dysuria.  Cannot get urine due to positioning from past cva.  Treat as uti

## 2024-05-02 ENCOUNTER — HOSPITAL ENCOUNTER (EMERGENCY)
Facility: HOSPITAL | Age: 79
Discharge: HOME | End: 2024-05-02
Payer: MEDICARE

## 2024-05-02 VITALS
SYSTOLIC BLOOD PRESSURE: 165 MMHG | BODY MASS INDEX: 22.08 KG/M2 | RESPIRATION RATE: 20 BRPM | WEIGHT: 120 LBS | TEMPERATURE: 98 F | HEIGHT: 62 IN | HEART RATE: 100 BPM | OXYGEN SATURATION: 96 % | DIASTOLIC BLOOD PRESSURE: 90 MMHG

## 2024-05-02 DIAGNOSIS — R00.0 TACHYCARDIA: ICD-10-CM

## 2024-05-02 DIAGNOSIS — M62.838 MUSCLE SPASMS OF LOWER EXTREMITY: Primary | ICD-10-CM

## 2024-05-02 LAB
ALBUMIN SERPL BCP-MCNC: 3.6 G/DL (ref 3.5–5.2)
ALP SERPL-CCNC: 102 U/L (ref 55–135)
ALT SERPL W/O P-5'-P-CCNC: 18 U/L (ref 10–44)
ANION GAP SERPL CALC-SCNC: 12 MMOL/L (ref 8–16)
AST SERPL-CCNC: 26 U/L (ref 10–40)
BASOPHILS # BLD AUTO: 0.06 K/UL (ref 0–0.2)
BASOPHILS NFR BLD: 0.9 % (ref 0–1.9)
BILIRUB SERPL-MCNC: 0.4 MG/DL (ref 0.1–1)
BILIRUB UR QL STRIP: NEGATIVE
BUN SERPL-MCNC: 17 MG/DL (ref 8–23)
CALCIUM SERPL-MCNC: 9.8 MG/DL (ref 8.7–10.5)
CHLORIDE SERPL-SCNC: 106 MMOL/L (ref 95–110)
CLARITY UR: CLEAR
CO2 SERPL-SCNC: 23 MMOL/L (ref 23–29)
COLOR UR: YELLOW
CREAT SERPL-MCNC: 0.8 MG/DL (ref 0.5–1.4)
DIFFERENTIAL METHOD BLD: ABNORMAL
EOSINOPHIL # BLD AUTO: 0.2 K/UL (ref 0–0.5)
EOSINOPHIL NFR BLD: 2.3 % (ref 0–8)
ERYTHROCYTE [DISTWIDTH] IN BLOOD BY AUTOMATED COUNT: 13.4 % (ref 11.5–14.5)
EST. GFR  (NO RACE VARIABLE): >60 ML/MIN/1.73 M^2
GLUCOSE SERPL-MCNC: 118 MG/DL (ref 70–110)
GLUCOSE UR QL STRIP: NEGATIVE
HCT VFR BLD AUTO: 42.1 % (ref 37–48.5)
HGB BLD-MCNC: 13.6 G/DL (ref 12–16)
HGB UR QL STRIP: NEGATIVE
IMM GRANULOCYTES # BLD AUTO: 0.01 K/UL (ref 0–0.04)
IMM GRANULOCYTES NFR BLD AUTO: 0.2 % (ref 0–0.5)
KETONES UR QL STRIP: NEGATIVE
LACTATE SERPL-SCNC: 1.6 MMOL/L (ref 0.5–2.2)
LEUKOCYTE ESTERASE UR QL STRIP: NEGATIVE
LYMPHOCYTES # BLD AUTO: 1 K/UL (ref 1–4.8)
LYMPHOCYTES NFR BLD: 14.8 % (ref 18–48)
MCH RBC QN AUTO: 28.8 PG (ref 27–31)
MCHC RBC AUTO-ENTMCNC: 32.3 G/DL (ref 32–36)
MCV RBC AUTO: 89 FL (ref 82–98)
MONOCYTES # BLD AUTO: 0.8 K/UL (ref 0.3–1)
MONOCYTES NFR BLD: 11.4 % (ref 4–15)
NEUTROPHILS # BLD AUTO: 4.7 K/UL (ref 1.8–7.7)
NEUTROPHILS NFR BLD: 70.4 % (ref 38–73)
NITRITE UR QL STRIP: NEGATIVE
NRBC BLD-RTO: 0 /100 WBC
PH UR STRIP: 6 [PH] (ref 5–8)
PLATELET # BLD AUTO: 240 K/UL (ref 150–450)
PMV BLD AUTO: 9.1 FL (ref 9.2–12.9)
POTASSIUM SERPL-SCNC: 4 MMOL/L (ref 3.5–5.1)
PROT SERPL-MCNC: 7.4 G/DL (ref 6–8.4)
PROT UR QL STRIP: ABNORMAL
RBC # BLD AUTO: 4.73 M/UL (ref 4–5.4)
SODIUM SERPL-SCNC: 141 MMOL/L (ref 136–145)
SP GR UR STRIP: 1.02 (ref 1–1.03)
URN SPEC COLLECT METH UR: ABNORMAL
UROBILINOGEN UR STRIP-ACNC: NEGATIVE EU/DL
WBC # BLD AUTO: 6.64 K/UL (ref 3.9–12.7)

## 2024-05-02 PROCEDURE — 93005 ELECTROCARDIOGRAM TRACING: CPT

## 2024-05-02 PROCEDURE — 87040 BLOOD CULTURE FOR BACTERIA: CPT | Performed by: NURSE PRACTITIONER

## 2024-05-02 PROCEDURE — 96375 TX/PRO/DX INJ NEW DRUG ADDON: CPT

## 2024-05-02 PROCEDURE — 71045 X-RAY EXAM CHEST 1 VIEW: CPT | Mod: TC

## 2024-05-02 PROCEDURE — 85025 COMPLETE CBC W/AUTO DIFF WBC: CPT | Performed by: NURSE PRACTITIONER

## 2024-05-02 PROCEDURE — 99285 EMERGENCY DEPT VISIT HI MDM: CPT | Mod: 25

## 2024-05-02 PROCEDURE — 81003 URINALYSIS AUTO W/O SCOPE: CPT | Performed by: NURSE PRACTITIONER

## 2024-05-02 PROCEDURE — 80053 COMPREHEN METABOLIC PANEL: CPT | Performed by: NURSE PRACTITIONER

## 2024-05-02 PROCEDURE — 71045 X-RAY EXAM CHEST 1 VIEW: CPT | Mod: 26,,, | Performed by: RADIOLOGY

## 2024-05-02 PROCEDURE — 63600175 PHARM REV CODE 636 W HCPCS: Performed by: NURSE PRACTITIONER

## 2024-05-02 PROCEDURE — 96374 THER/PROPH/DIAG INJ IV PUSH: CPT

## 2024-05-02 PROCEDURE — 93010 ELECTROCARDIOGRAM REPORT: CPT | Mod: ,,, | Performed by: INTERNAL MEDICINE

## 2024-05-02 PROCEDURE — 25000003 PHARM REV CODE 250: Performed by: NURSE PRACTITIONER

## 2024-05-02 PROCEDURE — 96361 HYDRATE IV INFUSION ADD-ON: CPT

## 2024-05-02 PROCEDURE — 83605 ASSAY OF LACTIC ACID: CPT | Performed by: NURSE PRACTITIONER

## 2024-05-02 RX ORDER — KETOROLAC TROMETHAMINE 30 MG/ML
15 INJECTION, SOLUTION INTRAMUSCULAR; INTRAVENOUS
Status: COMPLETED | OUTPATIENT
Start: 2024-05-02 | End: 2024-05-02

## 2024-05-02 RX ORDER — ORPHENADRINE CITRATE 30 MG/ML
30 INJECTION INTRAMUSCULAR; INTRAVENOUS
Status: COMPLETED | OUTPATIENT
Start: 2024-05-02 | End: 2024-05-02

## 2024-05-02 RX ORDER — TIZANIDINE 4 MG/1
4 TABLET ORAL EVERY 6 HOURS PRN
Qty: 14 TABLET | Refills: 0 | Status: SHIPPED | OUTPATIENT
Start: 2024-05-02 | End: 2024-05-12

## 2024-05-02 RX ORDER — ORPHENADRINE CITRATE 30 MG/ML
60 INJECTION INTRAMUSCULAR; INTRAVENOUS
Status: DISCONTINUED | OUTPATIENT
Start: 2024-05-02 | End: 2024-05-02

## 2024-05-02 RX ADMIN — SODIUM CHLORIDE 500 ML: 9 INJECTION, SOLUTION INTRAVENOUS at 08:05

## 2024-05-02 RX ADMIN — KETOROLAC TROMETHAMINE 15 MG: 30 INJECTION, SOLUTION INTRAMUSCULAR at 08:05

## 2024-05-02 RX ADMIN — ORPHENADRINE CITRATE 30 MG: 60 INJECTION INTRAMUSCULAR; INTRAVENOUS at 07:05

## 2024-05-02 RX ADMIN — ORPHENADRINE CITRATE 30 MG: 60 INJECTION INTRAMUSCULAR; INTRAVENOUS at 08:05

## 2024-05-03 ENCOUNTER — OFFICE VISIT (OUTPATIENT)
Dept: FAMILY MEDICINE | Facility: CLINIC | Age: 79
End: 2024-05-03
Payer: MEDICARE

## 2024-05-03 VITALS
HEIGHT: 62 IN | WEIGHT: 119.94 LBS | BODY MASS INDEX: 22.07 KG/M2 | HEART RATE: 87 BPM | OXYGEN SATURATION: 94 % | RESPIRATION RATE: 16 BRPM | DIASTOLIC BLOOD PRESSURE: 70 MMHG | SYSTOLIC BLOOD PRESSURE: 115 MMHG

## 2024-05-03 DIAGNOSIS — B96.89 ACUTE BACTERIAL BRONCHITIS: Primary | ICD-10-CM

## 2024-05-03 DIAGNOSIS — J20.8 ACUTE BACTERIAL BRONCHITIS: Primary | ICD-10-CM

## 2024-05-03 DIAGNOSIS — R25.2 LEG CRAMPS: ICD-10-CM

## 2024-05-03 LAB
OHS QRS DURATION: 84 MS
OHS QTC CALCULATION: 470 MS

## 2024-05-03 PROCEDURE — 99213 OFFICE O/P EST LOW 20 MIN: CPT | Mod: PBBFAC,PN | Performed by: STUDENT IN AN ORGANIZED HEALTH CARE EDUCATION/TRAINING PROGRAM

## 2024-05-03 PROCEDURE — 99214 OFFICE O/P EST MOD 30 MIN: CPT | Mod: S$PBB,,, | Performed by: STUDENT IN AN ORGANIZED HEALTH CARE EDUCATION/TRAINING PROGRAM

## 2024-05-03 PROCEDURE — 99999 PR PBB SHADOW E&M-EST. PATIENT-LVL III: CPT | Mod: PBBFAC,,, | Performed by: STUDENT IN AN ORGANIZED HEALTH CARE EDUCATION/TRAINING PROGRAM

## 2024-05-03 RX ORDER — PROMETHAZINE HYDROCHLORIDE AND DEXTROMETHORPHAN HYDROBROMIDE 6.25; 15 MG/5ML; MG/5ML
5 SYRUP ORAL EVERY 6 HOURS PRN
Qty: 118 ML | Refills: 0 | Status: SHIPPED | OUTPATIENT
Start: 2024-05-03 | End: 2024-05-13

## 2024-05-03 RX ORDER — ALBUTEROL SULFATE 0.83 MG/ML
2.5 SOLUTION RESPIRATORY (INHALATION) EVERY 6 HOURS PRN
Qty: 100 ML | Refills: 3 | Status: SHIPPED | OUTPATIENT
Start: 2024-05-03 | End: 2024-05-17 | Stop reason: SDUPTHER

## 2024-05-03 RX ORDER — DOXYCYCLINE 100 MG/1
100 CAPSULE ORAL 2 TIMES DAILY
Qty: 20 CAPSULE | Refills: 0 | Status: SHIPPED | OUTPATIENT
Start: 2024-05-03 | End: 2024-05-17

## 2024-05-03 RX ORDER — PREDNISONE 20 MG/1
20 TABLET ORAL DAILY
Qty: 5 TABLET | Refills: 0 | Status: SHIPPED | OUTPATIENT
Start: 2024-05-03 | End: 2024-05-08

## 2024-05-03 NOTE — ED PROVIDER NOTES
Encounter Date: 5/2/2024       History     Chief Complaint   Patient presents with    General Illness     Cough and congestion x 10 days     Presents to ED with complaints muscle spasms.  Patient is a 78-year-old female with a history of CVA that is aphasic.  She has right arm paralysis and a right AKA.  She is accompanied by her daughter who provides her history.  Ms. Pan is alert and makes grunting sounds but otherwise can not speak.  According to Ms. Pan daughter any time Mrs. Pan gets an infection she develops severe twitches and muscle spasms in her right AKA stump.  She was seen by her PCP on 04/23 with the twitching and spasms and it was felt that she had a UTI at that time.  She was unable to obtain a urine sample.  She was then treated empirically with nitrofurantoin.  She has finished this medication.      Review of patient's allergies indicates:   Allergen Reactions    Penicillins Other (See Comments)     Unknown     Past Medical History:   Diagnosis Date    Stroke      Past Surgical History:   Procedure Laterality Date    Right Above knee amputation       No family history on file.  Social History     Tobacco Use    Smoking status: Former     Types: Cigarettes    Smokeless tobacco: Never   Substance Use Topics    Alcohol use: Never    Drug use: Never     Review of Systems   Constitutional:  Negative for fever.   HENT:  Negative for sore throat.    Respiratory:  Positive for cough. Negative for shortness of breath.    Cardiovascular:  Negative for chest pain.   Gastrointestinal:  Negative for nausea.   Genitourinary:  Negative for dysuria.   Musculoskeletal:  Negative for back pain.        Muscle spasm right lower extremity stump   Skin:  Negative for rash.   Neurological:  Negative for weakness.   Hematological:  Does not bruise/bleed easily.       Physical Exam     Initial Vitals [05/02/24 1913]   BP Pulse Resp Temp SpO2   (!) 165/90 (!) 115 16 98.3 °F (36.8 °C) 95 %      MAP       --          Physical Exam    Nursing note and vitals reviewed.  Constitutional: She appears well-developed and well-nourished.   HENT:   Head: Normocephalic and atraumatic.   Eyes: EOM are normal.   Neck: Neck supple.   Cardiovascular:  Normal rate and regular rhythm.           Pulmonary/Chest: Breath sounds normal. She has no wheezes.   Abdominal: Abdomen is soft. There is no abdominal tenderness.   Musculoskeletal:      Cervical back: Neck supple.      Comments:  Right AKA stump. Frequent      Neurological: She is alert and oriented to person, place, and time.   Skin: Skin is warm and dry. Capillary refill takes less than 2 seconds.   Psychiatric: She has a normal mood and affect.         ED Course   Procedures  Labs Reviewed   CBC W/ AUTO DIFFERENTIAL - Abnormal; Notable for the following components:       Result Value    MPV 9.1 (*)     Lymph % 14.8 (*)     All other components within normal limits   COMPREHENSIVE METABOLIC PANEL - Abnormal; Notable for the following components:    Glucose 118 (*)     All other components within normal limits   URINALYSIS, REFLEX TO URINE CULTURE - Abnormal; Notable for the following components:    Protein, UA Trace (*)     All other components within normal limits    Narrative:     Preferred Collection Type->Urine, Clean Catch  Specimen Source->Urine   CULTURE, BLOOD   LACTIC ACID, PLASMA   LACTIC ACID, PLASMA          Imaging Results              X-Ray Chest AP Portable (Final result)  Result time 05/02/24 20:26:27      Final result by Oniel Luis MD (05/02/24 20:26:27)                   Impression:      Chronic coarse interstitial attenuation, similar to prior examination.  No new large confluent airspace consolidation appreciated.      Electronically signed by: Oniel Luis MD  Date:    05/02/2024  Time:    20:26               Narrative:    EXAMINATION:  XR CHEST AP PORTABLE    CLINICAL HISTORY:  Sepsis;    TECHNIQUE:  Single frontal view of the chest was  performed.    COMPARISON:  08/26/2023    FINDINGS:  Cardiac silhouette is stable in size and configuration.  There is atherosclerotic calcification of the thoracic aorta.  There is mild elevation of the left hemidiaphragm.  Lungs demonstrate chronic coarse interstitial attenuation, similar to prior examination.  No new large confluent airspace consolidation identified.  No significant volume of pleural fluid or pneumothorax appreciated.  Osseous structures demonstrate degenerative changes.                                       Medications   orphenadrine injection 30 mg (30 mg Intravenous Given 5/2/24 1954)   orphenadrine injection 30 mg (30 mg Intravenous Given 5/2/24 2020)   ketorolac injection 15 mg (15 mg Intravenous Given 5/2/24 2059)   sodium chloride 0.9% bolus 500 mL 500 mL (0 mLs Intravenous Stopped 5/2/24 2130)     Medical Decision Making  Amount and/or Complexity of Data Reviewed  Labs: ordered.  Radiology: ordered.    Risk  Prescription drug management.               ED Course as of 05/02/24 2218   Thu May 02, 2024   2008 CBC auto differential(!)  WBC 6.64  H/H 13.6/42.1 [NP]   2029 Comprehensive metabolic panel(!)  Glucose 118 [NP]   2033 X-Ray Chest AP Portable  Chronic interstitial attenuation, no new changes [NP]   2042 Lactic acid, plasma #1  Lactic acid 1.6 [NP]   2210 Urinalysis, Reflex to Urine Culture Urine, Clean Catch(!)  Trace protein [NP]   2215 Labs unremarkable. Spasms have resolved currently. daughter wishes to take her home and follow up with PCP tomorrow [NP]      ED Course User Index  [NP] Milly Birmingham, ADOLFO                             Clinical Impression:  Final diagnoses:  [R00.0] Tachycardia  [M62.838] Muscle spasms of lower extremity (Primary)          ED Disposition Condition    Discharge Good          ED Prescriptions       Medication Sig Dispense Start Date End Date Auth. Provider    tiZANidine (ZANAFLEX) 4 MG tablet Take 1 tablet (4 mg total) by mouth every 6 (six) hours as  needed (muscle spasms). 14 tablet 5/2/2024 5/12/2024 Milly Birmingham, ADOLFO          Follow-up Information       Follow up With Specialties Details Why Contact Info    Estee Fierro MD Family Medicine  schedule an appointment [address] [place] [address]  [address]  [place] [address]  [phone]               Milly Birmingham, ADOLFO  05/02/24 2029

## 2024-05-03 NOTE — PROGRESS NOTES
Subjective:       Patient ID: Meaghan Pan is a 78 y.o. female.    Chief Complaint: Follow-up (2wk follow up ), Nasal Congestion, and Cough    Continued leg cramps  No fevers  Has a respiratory illness with cough/congestion  No ear pain. No nasal pain. No abdominal pain. No dysuria.    She has chronic dysphagia and difficulty of clearing her cough/secretions.  Her daughter and granddaughter have a respiratory virus as well  Covid was negative  CXR in the ED was normal without pneumonia        Patient Active Problem List   Diagnosis    Tobacco abuse    H/O: CVA (cerebrovascular accident)    Dysphagia    Difficulty mobilizing in home    Chronic right shoulder pain    Aphasia as late effect of cerebrovascular accident    Anxiety with depression    Leg cramps    Seizures    Hx of AKA (above knee amputation), right    Aortic atherosclerosis    Candidal intertrigo    Hearing loss     Meaghan has a current medication list which includes the following prescription(s): aspirin, baclofen, duloxetine, guaifenesin, ibuprofen, lactobacillus acidophilus, levetiracetam, levetiracetam, nitrofurantoin (macrocrystal-monohydrate), tizanidine, albuterol, doxycycline, prednisone, and promethazine-dextromethorphan.    Review of Systems   Constitutional:  Negative for activity change and appetite change.   Respiratory:  Positive for cough. Negative for chest tightness, shortness of breath, wheezing and stridor.    Cardiovascular:  Negative for chest pain.   Gastrointestinal:  Negative for abdominal pain.   Genitourinary:  Negative for dysuria.   Integumentary:  Negative for rash.   Psychiatric/Behavioral:  Negative for dysphoric mood and sleep disturbance. The patient is not nervous/anxious.          Health Maintenance Due   Topic Date Due    Hepatitis C Screening  Never done    TETANUS VACCINE  Never done    DEXA Scan  Never done    Shingles Vaccine (1 of 2) Never done    RSV Vaccine (Age 60+ and Pregnant patients) (1 - 1-dose 60+  series) Never done    Pneumococcal Vaccines (Age 65+) (1 of 1 - PCV) Never done    Lipid Panel  01/30/2023    COVID-19 Vaccine (3 - 2023-24 season) 09/01/2023        Objective:      Physical Exam  Constitutional:       General: She is not in acute distress.     Appearance: Normal appearance. She is not ill-appearing.   HENT:      Right Ear: Tympanic membrane, ear canal and external ear normal. There is no impacted cerumen.      Left Ear: Tympanic membrane, ear canal and external ear normal. There is no impacted cerumen.   Eyes:      Conjunctiva/sclera: Conjunctivae normal.   Cardiovascular:      Rate and Rhythm: Normal rate and regular rhythm.      Heart sounds: Normal heart sounds. No murmur heard.  Pulmonary:      Effort: Pulmonary effort is normal. No respiratory distress.      Breath sounds: Normal breath sounds. No wheezing, rhonchi or rales.      Comments: Course breath sounds  Skin:     General: Skin is warm and dry.   Neurological:      Mental Status: She is alert. Mental status is at baseline.      Gait: Gait normal.   Psychiatric:         Mood and Affect: Mood normal.         Behavior: Behavior normal.         Thought Content: Thought content normal.         Judgment: Judgment normal.         Assessment:       1. Acute bacterial bronchitis    2. Leg cramps        Plan:       1. Acute bacterial bronchitis  Comments:  start prednisone, cough medictioan, doxy  Orders:  -     predniSONE (DELTASONE) 20 MG tablet; Take 1 tablet (20 mg total) by mouth once daily. for 5 days  Dispense: 5 tablet; Refill: 0  -     doxycycline (MONODOX) 100 MG capsule; Take 1 capsule (100 mg total) by mouth 2 (two) times daily. for 10 days  Dispense: 20 capsule; Refill: 0  -     promethazine-dextromethorphan (PROMETHAZINE-DM) 6.25-15 mg/5 mL Syrp; Take 5 mLs by mouth every 6 (six) hours as needed (cough).  Dispense: 118 mL; Refill: 0  -     NEBULIZER FOR HOME USE  -     albuterol (PROVENTIL) 2.5 mg /3 mL (0.083 %) nebulizer solution;  Take 3 mLs (2.5 mg total) by nebulization every 6 (six) hours as needed for Wheezing. Rescue  Dispense: 100 mL; Refill: 3  -     NEBULIZER KIT (SUPPLIES) FOR HOME USE    2. Leg cramps  Assessment & Plan:  Continued. Start tizanidine

## 2024-05-08 ENCOUNTER — TELEPHONE (OUTPATIENT)
Dept: FAMILY MEDICINE | Facility: CLINIC | Age: 79
End: 2024-05-08
Payer: MEDICARE

## 2024-05-08 LAB — BACTERIA BLD CULT: NORMAL

## 2024-05-15 ENCOUNTER — TELEPHONE (OUTPATIENT)
Dept: FAMILY MEDICINE | Facility: CLINIC | Age: 79
End: 2024-05-15
Payer: MEDICARE

## 2024-05-15 NOTE — TELEPHONE ENCOUNTER
: [facility] with [facility] at  [phone] fax at [phone]   patient is requesting services for home based palliative program to better enhance patient's quality of life day by day. Verbal orders are okay. Please advise

## 2024-05-15 NOTE — TELEPHONE ENCOUNTER
----- Message from Emma Steffen sent at 5/15/2024 11:53 AM CDT -----  Regarding: Needs Medical Order  Contact: [facility] with connecting care program at  [phone]  Type: Needs Medical Order  Who Called: [facility] with [facility] care program at  [phone] fax at [phone]  Additional Information: patient is requesting services for home based palliative program to better enhance patient's quality of life day by day. Verbal orders are okay. Please call and advise. Thank you

## 2024-05-17 ENCOUNTER — OFFICE VISIT (OUTPATIENT)
Dept: FAMILY MEDICINE | Facility: CLINIC | Age: 79
End: 2024-05-17
Payer: MEDICARE

## 2024-05-17 VITALS
WEIGHT: 129.19 LBS | HEIGHT: 62 IN | RESPIRATION RATE: 14 BRPM | OXYGEN SATURATION: 96 % | DIASTOLIC BLOOD PRESSURE: 68 MMHG | HEART RATE: 69 BPM | SYSTOLIC BLOOD PRESSURE: 114 MMHG | BODY MASS INDEX: 23.77 KG/M2

## 2024-05-17 DIAGNOSIS — H69.90 DYSFUNCTION OF EUSTACHIAN TUBE, UNSPECIFIED LATERALITY: ICD-10-CM

## 2024-05-17 DIAGNOSIS — F41.8 ANXIETY WITH DEPRESSION: ICD-10-CM

## 2024-05-17 DIAGNOSIS — R05.1 ACUTE COUGH: ICD-10-CM

## 2024-05-17 DIAGNOSIS — I69.320 APHASIA AS LATE EFFECT OF CEREBROVASCULAR ACCIDENT: ICD-10-CM

## 2024-05-17 DIAGNOSIS — R53.81 DEBILITY: ICD-10-CM

## 2024-05-17 DIAGNOSIS — J20.8 ACUTE BACTERIAL BRONCHITIS: ICD-10-CM

## 2024-05-17 DIAGNOSIS — H91.90 HEARING LOSS, UNSPECIFIED HEARING LOSS TYPE, UNSPECIFIED LATERALITY: ICD-10-CM

## 2024-05-17 DIAGNOSIS — K86.2 PANCREATIC CYST: Primary | ICD-10-CM

## 2024-05-17 DIAGNOSIS — R25.2 LEG CRAMPS: ICD-10-CM

## 2024-05-17 DIAGNOSIS — B96.89 ACUTE BACTERIAL BRONCHITIS: ICD-10-CM

## 2024-05-17 DIAGNOSIS — R56.9 SEIZURES: ICD-10-CM

## 2024-05-17 DIAGNOSIS — R68.2 DRY MOUTH: ICD-10-CM

## 2024-05-17 PROCEDURE — 99214 OFFICE O/P EST MOD 30 MIN: CPT | Mod: S$PBB,,, | Performed by: STUDENT IN AN ORGANIZED HEALTH CARE EDUCATION/TRAINING PROGRAM

## 2024-05-17 PROCEDURE — 99999 PR PBB SHADOW E&M-EST. PATIENT-LVL IV: CPT | Mod: PBBFAC,,, | Performed by: STUDENT IN AN ORGANIZED HEALTH CARE EDUCATION/TRAINING PROGRAM

## 2024-05-17 PROCEDURE — 99214 OFFICE O/P EST MOD 30 MIN: CPT | Mod: PBBFAC,PN | Performed by: STUDENT IN AN ORGANIZED HEALTH CARE EDUCATION/TRAINING PROGRAM

## 2024-05-17 RX ORDER — BENZONATATE 100 MG/1
100 CAPSULE ORAL 3 TIMES DAILY PRN
COMMUNITY
Start: 2024-05-05 | End: 2024-05-17

## 2024-05-17 RX ORDER — FLUTICASONE PROPIONATE 50 MCG
1 SPRAY, SUSPENSION (ML) NASAL DAILY
Qty: 16 G | Refills: 1 | Status: SHIPPED | OUTPATIENT
Start: 2024-05-17

## 2024-05-17 RX ORDER — ALBUTEROL SULFATE 0.83 MG/ML
2.5 SOLUTION RESPIRATORY (INHALATION) EVERY 6 HOURS PRN
Qty: 100 ML | Refills: 3 | Status: SHIPPED | OUTPATIENT
Start: 2024-05-17 | End: 2025-05-17

## 2024-05-17 NOTE — ASSESSMENT & PLAN NOTE
Continue cymbalta. Consider increase at next visit.  Awaiting some improvement in dry mouth before increasing

## 2024-05-17 NOTE — PROGRESS NOTES
Subjective:       Patient ID: Meaghan Pan is a 78 y.o. female.    Chief Complaint: Follow-up (ER visit) and Otalgia    Continued issues with ear pain and hearing loss  This is making it extra difficult to communicate and understand her situation.  She also complicates this with her past cva and aphagia.  She has been to ENT recently.  She has no wax in the ear today    Her leg cramps are better  No seizures  She is having less cough and the breathing treatments are helping.    She is having increased dry mouth.  Having a hard time getting her lips down over her teeth and dryness of her mouth.    They are worried about the pancreatic cyst seen on CT and are not sure how to proceed with any needed workup    1. No acute finding in the chest.  2. Bilobed cystic lesion of the pancreatic body, possibly a side branch  IPMN, benign cyst, or sequelae of prior pancreatitis.  Follow-up is  recommended.    FINDINGS:  Lungs: Moderate emphysema is noted in both lungs.  Atelectatic changes  Are noted in the posteroinferior aspect of the left upper lung lobe along  the left major fissure.  Small scar is noted in the right lung apex and medial  aspect of the right upper lung lobe.  Small atelectatic bands are noted in the  anterior aspect of the right upper lobe.  Tiny calcified granuloma is noted in the lateral aspect of the  right upper lobe.    Pleural spaces: Unremarkable. No pneumothorax. No pleural effusion.  Heart: Unremarkable. No cardiomegaly. No pericardial effusion.  Lymph nodes: Unremarkable. No enlarged lymph nodes.  Vasculature: Coronary artery calcifications are present.  No aortic  aneurysm.    Bones/joints: Unremarkable. No acute fracture.  Soft tissues: Unremarkable.    Abdomen: 3.5 x 2.3 x 1.6 cm bilobed cystic lesion of the pancreatic  body.      They are looking into palliative care for her to assist as things are getting more difficult and less safe at home when she becomes debilitated.  They are in need of  some additional therapy as she has gotten weak and can no longer reach up to the light switches.  Her daughter is feeling overwhelmed when Ms Harding gets very ill quickly    Ms Harding is getting more down/depressed when she becomes ill.  She is feeling a bit better since she is improving over the last few days but is considering an increase in her medication.        .  Patient Active Problem List   Diagnosis    Tobacco abuse    H/O: CVA (cerebrovascular accident)    Dysphagia    Difficulty mobilizing in home    Chronic right shoulder pain    Aphasia as late effect of cerebrovascular accident    Anxiety with depression    Leg cramps    Seizures    Hx of AKA (above knee amputation), right    Aortic atherosclerosis    Candidal intertrigo    Hearing loss     Meaghan has a current medication list which includes the following prescription(s): aspirin, baclofen, duloxetine, guaifenesin, ibuprofen, lactobacillus acidophilus, levetiracetam, levetiracetam, albuterol, and fluticasone propionate.    Review of Systems   Constitutional:  Positive for fatigue. Negative for activity change and appetite change.   HENT:  Positive for ear pain and hearing loss.    Respiratory:  Positive for cough. Negative for chest tightness, shortness of breath and wheezing.    Cardiovascular:  Negative for chest pain and leg swelling.   Gastrointestinal:  Negative for abdominal pain and change in bowel habit.   Genitourinary:  Negative for dysuria.   Integumentary:  Negative for rash.   Neurological:  Positive for weakness.   Psychiatric/Behavioral:  Positive for dysphoric mood. Negative for sleep disturbance. The patient is not nervous/anxious.          Health Maintenance Due   Topic Date Due    Hepatitis C Screening  Never done    TETANUS VACCINE  Never done    DEXA Scan  Never done    Shingles Vaccine (1 of 2) Never done    RSV Vaccine (Age 60+ and Pregnant patients) (1 - 1-dose 60+ series) Never done    Pneumococcal Vaccines (Age 65+) (1 of 1 -  PCV) Never done    Lipid Panel  01/30/2023    COVID-19 Vaccine (3 - 2023-24 season) 09/01/2023        Objective:      Physical Exam  Constitutional:       General: She is not in acute distress.     Appearance: Normal appearance. She is not ill-appearing.   HENT:      Right Ear: Tympanic membrane, ear canal and external ear normal. There is no impacted cerumen.      Left Ear: Tympanic membrane, ear canal and external ear normal. There is no impacted cerumen.   Eyes:      Conjunctiva/sclera: Conjunctivae normal.   Cardiovascular:      Rate and Rhythm: Normal rate and regular rhythm.      Heart sounds: Normal heart sounds. No murmur heard.  Pulmonary:      Effort: Pulmonary effort is normal. No respiratory distress.      Breath sounds: Rhonchi (less than last visit) present. No wheezing or rales.      Comments: Course breath sounds  Musculoskeletal:      Comments: S/p right AKA   Skin:     General: Skin is warm and dry.   Neurological:      Mental Status: She is alert. Mental status is at baseline.      Gait: Gait abnormal (in a wheelchair).      Comments: Significant aphagia- unchanged   Psychiatric:         Mood and Affect: Mood normal.         Behavior: Behavior normal.         Thought Content: Thought content normal.         Judgment: Judgment normal.         Assessment:       1. Pancreatic cyst    2. Dysfunction of Eustachian tube, unspecified laterality    3. Acute bacterial bronchitis    4. Debility    5. Dry mouth    6. Anxiety with depression    7. Aphasia as late effect of cerebrovascular accident    8. Seizures    9. Hearing loss, unspecified hearing loss type, unspecified laterality    10. Leg cramps    11. Acute cough        Plan:       1. Pancreatic cyst  Comments:  GI eval  Orders:  -     Ambulatory referral/consult to Gastroenterology; Future; Expected date: 05/24/2024    2. Dysfunction of Eustachian tube, unspecified laterality  Comments:  tiral of flonase to help with ear pain- possibly from fluid. no  infection. no wax  Orders:  -     fluticasone propionate (FLONASE) 50 mcg/actuation nasal spray; 1 spray (50 mcg total) by Each Nostril route once daily.  Dispense: 16 g; Refill: 1    3. Acute bacterial bronchitis  Comments:  doing better. continue nebs prn  Orders:  -     albuterol (PROVENTIL) 2.5 mg /3 mL (0.083 %) nebulizer solution; Take 3 mLs (2.5 mg total) by nebulization every 6 (six) hours as needed for Wheezing. Rescue  Dispense: 100 mL; Refill: 3    4. Debility  Comments:  pt/ot through home health- [facility]  Orders:  -     SUBSEQUENT HOME HEALTH ORDERS    5. Dry mouth  Comments:  suspect med induced. monitor as she is getting off of cough meds    6. Anxiety with depression  Assessment & Plan:  Continue cymbalta. Consider increase at next visit.  Awaiting some improvement in dry mouth before increasing      7. Aphasia as late effect of cerebrovascular accident  Assessment & Plan:  Persists.  Difficulty communicating and even comprehending some and causes distress to her      8. Seizures  Assessment & Plan:  None recently      9. Hearing loss, unspecified hearing loss type, unspecified laterality  Assessment & Plan:  Makes understanding her situation difficult      10. Leg cramps  Assessment & Plan:  Seems to be doing better      11. Acute cough  Comments:  doing better with her nebulizer treatments

## 2024-06-04 ENCOUNTER — OFFICE VISIT (OUTPATIENT)
Dept: FAMILY MEDICINE | Facility: CLINIC | Age: 79
End: 2024-06-04
Payer: MEDICARE

## 2024-06-04 VITALS
HEIGHT: 62 IN | BODY MASS INDEX: 23.77 KG/M2 | WEIGHT: 129.19 LBS | SYSTOLIC BLOOD PRESSURE: 124 MMHG | RESPIRATION RATE: 16 BRPM | OXYGEN SATURATION: 95 % | HEART RATE: 87 BPM | DIASTOLIC BLOOD PRESSURE: 84 MMHG

## 2024-06-04 DIAGNOSIS — H69.90 DYSFUNCTION OF EUSTACHIAN TUBE, UNSPECIFIED LATERALITY: ICD-10-CM

## 2024-06-04 DIAGNOSIS — K86.2 PANCREATIC CYST: ICD-10-CM

## 2024-06-04 DIAGNOSIS — R25.2 LEG CRAMPS: Primary | ICD-10-CM

## 2024-06-04 DIAGNOSIS — R68.2 DRY MOUTH: ICD-10-CM

## 2024-06-04 DIAGNOSIS — Z74.09 DIFFICULTY MOBILIZING IN HOME: ICD-10-CM

## 2024-06-04 PROCEDURE — 99213 OFFICE O/P EST LOW 20 MIN: CPT | Mod: PBBFAC,PN | Performed by: STUDENT IN AN ORGANIZED HEALTH CARE EDUCATION/TRAINING PROGRAM

## 2024-06-04 PROCEDURE — 99213 OFFICE O/P EST LOW 20 MIN: CPT | Mod: S$PBB,,, | Performed by: STUDENT IN AN ORGANIZED HEALTH CARE EDUCATION/TRAINING PROGRAM

## 2024-06-04 PROCEDURE — 99999 PR PBB SHADOW E&M-EST. PATIENT-LVL III: CPT | Mod: PBBFAC,,, | Performed by: STUDENT IN AN ORGANIZED HEALTH CARE EDUCATION/TRAINING PROGRAM

## 2024-06-04 RX ORDER — OMEPRAZOLE 40 MG/1
40 CAPSULE, DELAYED RELEASE ORAL EVERY MORNING
COMMUNITY
Start: 2024-06-03 | End: 2024-07-29

## 2024-06-04 NOTE — PROGRESS NOTES
Subjective:       Patient ID: Meaghan Pan is a 78 y.o. female.    Chief Complaint: Follow-up (2 week follow pt states she is doing much better)    Overall doing very well  Occasional ear pain  She is not coughing anymore  No cramps  Getting stronger    Seeing GI for the pancreatic cyst and monitoring        .  Patient Active Problem List   Diagnosis    Tobacco abuse    H/O: CVA (cerebrovascular accident)    Dysphagia    Difficulty mobilizing in home    Chronic right shoulder pain    Aphasia as late effect of cerebrovascular accident    Anxiety with depression    Leg cramps    Seizures    Hx of AKA (above knee amputation), right    Aortic atherosclerosis    Candidal intertrigo    Hearing loss     Meaghan has a current medication list which includes the following prescription(s): albuterol, aspirin, baclofen, duloxetine, fluticasone propionate, guaifenesin, ibuprofen, lactobacillus acidophilus, levetiracetam, levetiracetam, and omeprazole.    Review of Systems   Constitutional:  Negative for activity change and appetite change.   Eyes:  Positive for pain.   Respiratory:  Negative for shortness of breath.    Cardiovascular:  Negative for chest pain.   Gastrointestinal:  Negative for abdominal pain.   Genitourinary:  Negative for dysuria.   Integumentary:  Negative for rash.   Psychiatric/Behavioral:  Negative for dysphoric mood and sleep disturbance. The patient is not nervous/anxious.          Health Maintenance Due   Topic Date Due    Hepatitis C Screening  Never done    TETANUS VACCINE  Never done    DEXA Scan  Never done    Shingles Vaccine (1 of 2) Never done    RSV Vaccine (Age 60+ and Pregnant patients) (1 - 1-dose 60+ series) Never done    Pneumococcal Vaccines (Age 65+) (1 of 1 - PCV) Never done    Lipid Panel  01/30/2023    COVID-19 Vaccine (3 - 2023-24 season) 09/01/2023        Objective:      Physical Exam  Constitutional:       General: She is not in acute distress.     Appearance: Normal appearance. She  is not ill-appearing.   HENT:      Right Ear: Tympanic membrane, ear canal and external ear normal. There is no impacted cerumen.      Left Ear: Tympanic membrane, ear canal and external ear normal. There is no impacted cerumen.   Eyes:      Conjunctiva/sclera: Conjunctivae normal.   Cardiovascular:      Rate and Rhythm: Normal rate and regular rhythm.      Heart sounds: Normal heart sounds. No murmur heard.  Pulmonary:      Effort: Pulmonary effort is normal. No respiratory distress.      Breath sounds: No wheezing, rhonchi or rales.      Comments: Occasional course breath sounds  Musculoskeletal:      Comments: S/p right AKA   Skin:     General: Skin is warm and dry.   Neurological:      Mental Status: She is alert. Mental status is at baseline.      Gait: Gait abnormal (in a wheelchair).      Comments: Significant aphagia- unchanged   Psychiatric:         Mood and Affect: Mood normal.         Behavior: Behavior normal.         Thought Content: Thought content normal.         Judgment: Judgment normal.         Assessment:       1. Leg cramps    2. Difficulty mobilizing in home    3. Pancreatic cyst    4. Dysfunction of Eustachian tube, unspecified laterality    5. Dry mouth        Plan:       1. Leg cramps  Assessment & Plan:  Much better      2. Difficulty mobilizing in home  Assessment & Plan:  Much better.      3. Pancreatic cyst  Comments:  Seeing GI and monitoring    4. Dysfunction of Eustachian tube, unspecified laterality  Comments:  doing better    5. Dry mouth  Comments:  doing better

## 2024-06-19 ENCOUNTER — EXTERNAL HOME HEALTH (OUTPATIENT)
Dept: HOME HEALTH SERVICES | Facility: HOSPITAL | Age: 79
End: 2024-06-19
Payer: MEDICARE

## 2024-07-08 DIAGNOSIS — H69.90 DYSFUNCTION OF EUSTACHIAN TUBE, UNSPECIFIED LATERALITY: ICD-10-CM

## 2024-07-08 RX ORDER — FLUTICASONE PROPIONATE 50 MCG
SPRAY, SUSPENSION (ML) NASAL
Qty: 32 ML | Refills: 3 | Status: SHIPPED | OUTPATIENT
Start: 2024-07-08

## 2024-07-08 NOTE — TELEPHONE ENCOUNTER
No care due was identified.  [facility] Embedded Care Due Messages. Reference number: [id number].   7/08/2024 7:31:18 AM CDT

## 2024-07-08 NOTE — TELEPHONE ENCOUNTER
Refill Routing Note   Medication(s) are not appropriate for processing by [facility] for the following reason(s):        New or recently adjusted medication    [facility] action(s):  Defer               Appointments  past 12m or future 3m with PCP    Date Provider   Last Visit   6/4/2024 Estee Fierro MD   Next Visit   7/15/2024 Estee Fierro MD   ED visits in past 90 days: 1        Note composed:10:22 AM 07/08/2024

## 2024-07-08 NOTE — TELEPHONE ENCOUNTER
----- Message from Estee Fierro MD sent at 7/8/2024  7:25 AM CDT -----  Contact: Carl wise [facility]  Please call pt and her daughter to get some updated bps and determine if she needs a nurse visit to verify cuff or if she needs an appointment with me or Key this week to address.  Thanks  Dr. CHISHOML  ----- Message -----  From: Katelynn Ryan MA  Sent: 7/5/2024   4:01 PM CDT  To: Estee Fierro MD    Please advise  ----- Message -----  From: Blaire Salter  Sent: 7/5/2024   2:11 PM CDT  To: [facility] Staff    Type: Needs Medical Advice    Who Called:  Carl the OT w [facility]  What is this regarding?:  He is calling to report an elevated BP which was 170/95.  Best Call Back Number:  [phone], therapist  Additional Information:  Please call the patient's OT back at the phone number listed above to advise. Thank you!

## 2024-07-08 NOTE — TELEPHONE ENCOUNTER
----- Message from Arlette Adriano sent at 7/8/2024  8:47 AM CDT -----  Contact: KEON ALVAREZ  Type:  Patient Returning Call    Who Called: KIM, DAUGHTER  Who Left Message for Patient: JULY   Does the patient know what this is regarding?: N/A  Would the patient rather a call back or a response via [email or web address]? CALL   Best Call Back Number: [phone] ([place])   Additional Information: THANK YOU   Patient had a bloody nose yesterday and  took her to [facility] [place] ED. They then spoke with her cardiologist who recommended she get a referral to ENT to investigate further.

## 2024-07-29 RX ORDER — DULOXETIN HYDROCHLORIDE 20 MG/1
20 CAPSULE, DELAYED RELEASE ORAL NIGHTLY
Qty: 90 CAPSULE | Refills: 3 | Status: SHIPPED | OUTPATIENT
Start: 2024-07-29

## 2024-07-29 NOTE — TELEPHONE ENCOUNTER
Refill Routing Note   Medication(s) are not appropriate for processing by [facility] for the following reason(s):        No active prescription written by provider    [facility] action(s):  Defer               Appointments  past 12m or future 3m with PCP    Date Provider   Last Visit   6/4/2024 Estee Fierro MD   Next Visit   Visit date not found Estee Fierro MD   ED visits in past 90 days: 1        Note composed:1:37 PM 07/29/2024

## 2024-07-29 NOTE — TELEPHONE ENCOUNTER
No care due was identified.  [facility] Embedded Care Due Messages. Reference number: [id number].   7/29/2024 12:23:13 AM CDT

## 2024-07-30 DIAGNOSIS — Z00.00 ENCOUNTER FOR MEDICARE ANNUAL WELLNESS EXAM: ICD-10-CM

## 2024-08-05 ENCOUNTER — PATIENT MESSAGE (OUTPATIENT)
Dept: FAMILY MEDICINE | Facility: CLINIC | Age: 79
End: 2024-08-05
Payer: MEDICARE

## 2024-08-07 ENCOUNTER — TELEPHONE (OUTPATIENT)
Dept: FAMILY MEDICINE | Facility: CLINIC | Age: 79
End: 2024-08-07
Payer: MEDICARE

## 2024-08-07 ENCOUNTER — PATIENT MESSAGE (OUTPATIENT)
Dept: FAMILY MEDICINE | Facility: CLINIC | Age: 79
End: 2024-08-07
Payer: MEDICARE

## 2024-08-08 ENCOUNTER — TELEPHONE (OUTPATIENT)
Dept: FAMILY MEDICINE | Facility: CLINIC | Age: 79
End: 2024-08-08
Payer: MEDICARE

## 2024-08-14 ENCOUNTER — OFFICE VISIT (OUTPATIENT)
Dept: FAMILY MEDICINE | Facility: CLINIC | Age: 79
End: 2024-08-14
Payer: MEDICARE

## 2024-08-14 VITALS
BODY MASS INDEX: 22.45 KG/M2 | WEIGHT: 122 LBS | HEIGHT: 62 IN | DIASTOLIC BLOOD PRESSURE: 82 MMHG | OXYGEN SATURATION: 95 % | SYSTOLIC BLOOD PRESSURE: 130 MMHG | RESPIRATION RATE: 16 BRPM | HEART RATE: 94 BPM

## 2024-08-14 DIAGNOSIS — Z79.899 LONG-TERM USE OF HIGH-RISK MEDICATION: Primary | ICD-10-CM

## 2024-08-14 DIAGNOSIS — F41.8 ANXIETY WITH DEPRESSION: ICD-10-CM

## 2024-08-14 DIAGNOSIS — Z74.09 DIFFICULTY MOBILIZING IN HOME: ICD-10-CM

## 2024-08-14 DIAGNOSIS — Z89.611 HX OF AKA (ABOVE KNEE AMPUTATION), RIGHT: ICD-10-CM

## 2024-08-14 DIAGNOSIS — Z86.73 H/O: CVA (CEREBROVASCULAR ACCIDENT): ICD-10-CM

## 2024-08-14 PROCEDURE — 99214 OFFICE O/P EST MOD 30 MIN: CPT | Mod: PBBFAC,PN | Performed by: STUDENT IN AN ORGANIZED HEALTH CARE EDUCATION/TRAINING PROGRAM

## 2024-08-14 PROCEDURE — 99999 PR PBB SHADOW E&M-EST. PATIENT-LVL IV: CPT | Mod: PBBFAC,,, | Performed by: STUDENT IN AN ORGANIZED HEALTH CARE EDUCATION/TRAINING PROGRAM

## 2024-08-14 NOTE — ASSESSMENT & PLAN NOTE
Wheelchair dependent  Could benefit from her custom wheelchair as she is wheelchair dependent and could benefit from potty seat portion to help with toileting if she cannot transfer safely

## 2024-08-14 NOTE — ASSESSMENT & PLAN NOTE
Has home care.  Stable  Could benefit from her custom wheelchair as she is wheelchair dependent and could benefit from potty seat portion to help with toileting if she cannot transfer safely

## 2024-08-14 NOTE — PROGRESS NOTES
Subjective:       Patient ID: Meaghan Pan is a 78 y.o. female.    Chief Complaint: Extremity Weakness    She had a fall a few weeks ago and became very weak and discouraged  She became fearful of transfers and is having a hard time re mastering this skill.  She has a new chair that she is getting  She is hoping to get a potty portion added so she can just back up onto the toilet and utilize this function.  I think she could greatly benefit from this.    She stopped her medications at that time and She is now back on her medications and would like to see how her bloodwork looks  They were concerned that her sodium may have gotten low and that is what was causing the change in behavior.    She has home care coming to the house- [facility]  They would like a nurse to come out to assist with meds/vitals/labs while she is having a hard time after this fall        .  Patient Active Problem List   Diagnosis    Tobacco abuse    H/O: CVA (cerebrovascular accident)    Dysphagia    Difficulty mobilizing in home    Chronic right shoulder pain    Aphasia as late effect of cerebrovascular accident    Anxiety with depression    Leg cramps    Seizures    Hx of AKA (above knee amputation), right    Aortic atherosclerosis    Candidal intertrigo    Hearing loss     Meaghan has a current medication list which includes the following prescription(s): albuterol, aspirin, baclofen, duloxetine, fluticasone propionate, guaifenesin, ibuprofen, lactobacillus acidophilus, levetiracetam, levetiracetam, and omeprazole.    Review of Systems   Constitutional:  Negative for activity change and appetite change.   Respiratory:  Negative for shortness of breath.    Cardiovascular:  Negative for chest pain.   Gastrointestinal:  Negative for abdominal pain.   Genitourinary:  Negative for dysuria.   Musculoskeletal:  Positive for gait problem.   Integumentary:  Negative for rash.   Neurological:  Positive for weakness.   Psychiatric/Behavioral:   Negative for dysphoric mood and sleep disturbance. The patient is not nervous/anxious.          Health Maintenance Due   Topic Date Due    Hepatitis C Screening  Never done    TETANUS VACCINE  Never done    DEXA Scan  Never done    Shingles Vaccine (1 of 2) Never done    RSV Vaccine (Age 60+ and Pregnant patients) (1 - 1-dose 60+ series) Never done    Pneumococcal Vaccines (Age 65+) (1 of 1 - PCV) Never done    Lipid Panel  01/30/2023    COVID-19 Vaccine (3 - 2023-24 season) 09/01/2023      Health Maintenance reviewed and discussed- dexa ordered, labs ordered, encouraged vaccines.     Objective:      Physical Exam  Constitutional:       General: She is not in acute distress.     Appearance: Normal appearance. She is not ill-appearing.   HENT:      Right Ear: Tympanic membrane, ear canal and external ear normal. There is no impacted cerumen.      Left Ear: Tympanic membrane, ear canal and external ear normal. There is no impacted cerumen.   Eyes:      Conjunctiva/sclera: Conjunctivae normal.   Cardiovascular:      Rate and Rhythm: Normal rate and regular rhythm.      Heart sounds: Normal heart sounds. No murmur heard.  Pulmonary:      Effort: Pulmonary effort is normal. No respiratory distress.      Breath sounds: No wheezing, rhonchi or rales.      Comments: Occasional course breath sounds  Musculoskeletal:      Comments: S/p right AKA   Skin:     General: Skin is warm and dry.   Neurological:      Mental Status: She is alert. Mental status is at baseline.      Gait: Gait abnormal (in a wheelchair).      Comments: Significant aphagia- unchanged   Psychiatric:         Mood and Affect: Mood normal.         Behavior: Behavior normal.         Thought Content: Thought content normal.         Judgment: Judgment normal.         Assessment:       1. Long-term use of high-risk medication    2. Difficulty mobilizing in home    3. Anxiety with depression    4. H/O: CVA (cerebrovascular accident)    5. Hx of AKA (above knee  amputation), right        Plan:       1. Long-term use of high-risk medication  Comments:  check for hyponatremia vs other cause of recent behavior change  Orders:  -     CBC Without Differential; Future; Expected date: 08/14/2024  -     Comprehensive Metabolic Panel; Future; Expected date: 08/14/2024  -     Lipid Panel; Future; Expected date: 08/14/2024  -     Hemoglobin A1C; Future; Expected date: 08/14/2024  -     TSH; Future; Expected date: 08/14/2024    2. Difficulty mobilizing in home  Assessment & Plan:  Has home care.  Stable  Could benefit from her custom wheelchair as she is wheelchair dependent and could benefit from potty seat portion to help with toileting if she cannot transfer safely    Orders:  -     SUBSEQUENT HOME HEALTH ORDERS    3. Anxiety with depression  Assessment & Plan:  Doing better overall.  Stable. Continue current medications and regular followup.        4. H/O: CVA (cerebrovascular accident)  Overview:  Last Assessment & Plan:   Formatting of this note might be different from the original.  Continue her aspirin.    Assessment & Plan:  Could benefit from her custom wheelchair as she is wheelchair dependent and could benefit from potty seat portion to help with toileting if she cannot transfer safely      5. Hx of AKA (above knee amputation), right  Assessment & Plan:  Wheelchair dependent  Could benefit from her custom wheelchair as she is wheelchair dependent and could benefit from potty seat portion to help with toileting if she cannot transfer safely

## 2024-08-14 NOTE — ASSESSMENT & PLAN NOTE
Could benefit from her custom wheelchair as she is wheelchair dependent and could benefit from potty seat portion to help with toileting if she cannot transfer safely

## 2024-08-15 ENCOUNTER — LAB VISIT (OUTPATIENT)
Dept: LAB | Facility: HOSPITAL | Age: 79
End: 2024-08-15
Attending: STUDENT IN AN ORGANIZED HEALTH CARE EDUCATION/TRAINING PROGRAM
Payer: MEDICARE

## 2024-08-15 DIAGNOSIS — Z79.899 LONG-TERM USE OF HIGH-RISK MEDICATION: ICD-10-CM

## 2024-08-15 LAB
ALBUMIN SERPL BCP-MCNC: 3.4 G/DL (ref 3.5–5.2)
ALP SERPL-CCNC: 94 U/L (ref 55–135)
ALT SERPL W/O P-5'-P-CCNC: 13 U/L (ref 10–44)
ANION GAP SERPL CALC-SCNC: 9 MMOL/L (ref 8–16)
AST SERPL-CCNC: 21 U/L (ref 10–40)
BILIRUB SERPL-MCNC: 0.5 MG/DL (ref 0.1–1)
BUN SERPL-MCNC: 21 MG/DL (ref 8–23)
CALCIUM SERPL-MCNC: 9.1 MG/DL (ref 8.7–10.5)
CHLORIDE SERPL-SCNC: 108 MMOL/L (ref 95–110)
CHOLEST SERPL-MCNC: 280 MG/DL (ref 120–199)
CHOLEST/HDLC SERPL: 4.5 {RATIO} (ref 2–5)
CO2 SERPL-SCNC: 22 MMOL/L (ref 23–29)
CREAT SERPL-MCNC: 0.8 MG/DL (ref 0.5–1.4)
ERYTHROCYTE [DISTWIDTH] IN BLOOD BY AUTOMATED COUNT: 13.4 % (ref 11.5–14.5)
EST. GFR  (NO RACE VARIABLE): >60 ML/MIN/1.73 M^2
ESTIMATED AVG GLUCOSE: 108 MG/DL (ref 68–131)
GLUCOSE SERPL-MCNC: 99 MG/DL (ref 70–110)
HBA1C MFR BLD: 5.4 % (ref 4–5.6)
HCT VFR BLD AUTO: 40.5 % (ref 37–48.5)
HDLC SERPL-MCNC: 62 MG/DL (ref 40–75)
HDLC SERPL: 22.1 % (ref 20–50)
HGB BLD-MCNC: 12.9 G/DL (ref 12–16)
LDLC SERPL CALC-MCNC: 205.4 MG/DL (ref 63–159)
MCH RBC QN AUTO: 28.2 PG (ref 27–31)
MCHC RBC AUTO-ENTMCNC: 31.9 G/DL (ref 32–36)
MCV RBC AUTO: 88 FL (ref 82–98)
NONHDLC SERPL-MCNC: 218 MG/DL
PLATELET # BLD AUTO: 324 K/UL (ref 150–450)
PMV BLD AUTO: 9.5 FL (ref 9.2–12.9)
POTASSIUM SERPL-SCNC: 4 MMOL/L (ref 3.5–5.1)
PROT SERPL-MCNC: 7.4 G/DL (ref 6–8.4)
RBC # BLD AUTO: 4.58 M/UL (ref 4–5.4)
SODIUM SERPL-SCNC: 139 MMOL/L (ref 136–145)
TRIGL SERPL-MCNC: 63 MG/DL (ref 30–150)
TSH SERPL DL<=0.005 MIU/L-ACNC: 0.78 UIU/ML (ref 0.4–4)
WBC # BLD AUTO: 7.08 K/UL (ref 3.9–12.7)

## 2024-08-15 PROCEDURE — 80053 COMPREHEN METABOLIC PANEL: CPT | Performed by: STUDENT IN AN ORGANIZED HEALTH CARE EDUCATION/TRAINING PROGRAM

## 2024-08-15 PROCEDURE — 85027 COMPLETE CBC AUTOMATED: CPT | Performed by: STUDENT IN AN ORGANIZED HEALTH CARE EDUCATION/TRAINING PROGRAM

## 2024-08-15 PROCEDURE — 80061 LIPID PANEL: CPT | Performed by: STUDENT IN AN ORGANIZED HEALTH CARE EDUCATION/TRAINING PROGRAM

## 2024-08-15 PROCEDURE — 84443 ASSAY THYROID STIM HORMONE: CPT | Performed by: STUDENT IN AN ORGANIZED HEALTH CARE EDUCATION/TRAINING PROGRAM

## 2024-08-15 PROCEDURE — 83036 HEMOGLOBIN GLYCOSYLATED A1C: CPT | Performed by: STUDENT IN AN ORGANIZED HEALTH CARE EDUCATION/TRAINING PROGRAM

## 2024-08-15 PROCEDURE — 36415 COLL VENOUS BLD VENIPUNCTURE: CPT | Performed by: STUDENT IN AN ORGANIZED HEALTH CARE EDUCATION/TRAINING PROGRAM

## 2024-08-27 ENCOUNTER — PATIENT MESSAGE (OUTPATIENT)
Dept: FAMILY MEDICINE | Facility: CLINIC | Age: 79
End: 2024-08-27
Payer: MEDICARE

## 2024-09-05 ENCOUNTER — EXTERNAL HOME HEALTH (OUTPATIENT)
Dept: HOME HEALTH SERVICES | Facility: HOSPITAL | Age: 79
End: 2024-09-05
Payer: MEDICARE

## 2024-09-05 ENCOUNTER — DOCUMENT SCAN (OUTPATIENT)
Dept: HOME HEALTH SERVICES | Facility: HOSPITAL | Age: 79
End: 2024-09-05
Payer: MEDICARE

## 2024-09-12 ENCOUNTER — TELEPHONE (OUTPATIENT)
Dept: FAMILY MEDICINE | Facility: CLINIC | Age: 79
End: 2024-09-12
Payer: MEDICARE

## 2024-09-20 ENCOUNTER — DOCUMENT SCAN (OUTPATIENT)
Dept: HOME HEALTH SERVICES | Facility: HOSPITAL | Age: 79
End: 2024-09-20
Payer: MEDICARE

## 2024-10-23 DIAGNOSIS — Z78.0 MENOPAUSE: ICD-10-CM

## 2024-12-12 ENCOUNTER — HOSPITAL ENCOUNTER (EMERGENCY)
Facility: HOSPITAL | Age: 79
Discharge: HOME | End: 2024-12-13
Attending: EMERGENCY MEDICINE
Payer: MEDICARE

## 2024-12-12 ENCOUNTER — HOSPITAL ENCOUNTER (EMERGENCY)
Facility: HOSPITAL | Age: 79
Discharge: HOME | End: 2024-12-12
Attending: EMERGENCY MEDICINE
Payer: MEDICARE

## 2024-12-12 VITALS
HEART RATE: 102 BPM | SYSTOLIC BLOOD PRESSURE: 165 MMHG | RESPIRATION RATE: 18 BRPM | BODY MASS INDEX: 22.44 KG/M2 | HEIGHT: 62 IN | DIASTOLIC BLOOD PRESSURE: 73 MMHG | OXYGEN SATURATION: 89 % | WEIGHT: 121.94 LBS | TEMPERATURE: 98 F

## 2024-12-12 VITALS
OXYGEN SATURATION: 92 % | SYSTOLIC BLOOD PRESSURE: 104 MMHG | DIASTOLIC BLOOD PRESSURE: 51 MMHG | HEART RATE: 78 BPM | RESPIRATION RATE: 16 BRPM | TEMPERATURE: 98 F

## 2024-12-12 DIAGNOSIS — M62.838 MUSCLE SPASM: Primary | ICD-10-CM

## 2024-12-12 DIAGNOSIS — M62.838 MUSCLE SPASMS OF LOWER EXTREMITY: ICD-10-CM

## 2024-12-12 DIAGNOSIS — F41.9 ANXIETY: ICD-10-CM

## 2024-12-12 DIAGNOSIS — N39.0 URINARY TRACT INFECTION WITHOUT HEMATURIA, SITE UNSPECIFIED: Primary | ICD-10-CM

## 2024-12-12 LAB
BACTERIA #/AREA URNS HPF: ABNORMAL /HPF
BILIRUB UR QL STRIP: NEGATIVE
CLARITY UR: ABNORMAL
COLOR UR: YELLOW
GLUCOSE UR QL STRIP: NEGATIVE
HGB UR QL STRIP: NEGATIVE
INFLUENZA A, MOLECULAR: NEGATIVE
INFLUENZA B, MOLECULAR: NEGATIVE
KETONES UR QL STRIP: ABNORMAL
LEUKOCYTE ESTERASE UR QL STRIP: ABNORMAL
MICROSCOPIC COMMENT: ABNORMAL
NITRITE UR QL STRIP: POSITIVE
PH UR STRIP: 6 [PH] (ref 5–8)
PROT UR QL STRIP: NEGATIVE
RBC #/AREA URNS HPF: 0 /HPF (ref 0–4)
SARS-COV-2 RDRP RESP QL NAA+PROBE: NEGATIVE
SP GR UR STRIP: 1.02 (ref 1–1.03)
SPECIMEN SOURCE: NORMAL
URN SPEC COLLECT METH UR: ABNORMAL
UROBILINOGEN UR STRIP-ACNC: NEGATIVE EU/DL
WBC #/AREA URNS HPF: 15 /HPF (ref 0–5)

## 2024-12-12 PROCEDURE — 25000003 PHARM REV CODE 250: Performed by: EMERGENCY MEDICINE

## 2024-12-12 PROCEDURE — 63600175 PHARM REV CODE 636 W HCPCS: Performed by: EMERGENCY MEDICINE

## 2024-12-12 PROCEDURE — 99284 EMERGENCY DEPT VISIT MOD MDM: CPT | Mod: 25,27

## 2024-12-12 PROCEDURE — 96372 THER/PROPH/DIAG INJ SC/IM: CPT | Performed by: EMERGENCY MEDICINE

## 2024-12-12 PROCEDURE — 81000 URINALYSIS NONAUTO W/SCOPE: CPT | Performed by: EMERGENCY MEDICINE

## 2024-12-12 PROCEDURE — 87502 INFLUENZA DNA AMP PROBE: CPT | Performed by: EMERGENCY MEDICINE

## 2024-12-12 PROCEDURE — P9612 CATHETERIZE FOR URINE SPEC: HCPCS

## 2024-12-12 PROCEDURE — 87086 URINE CULTURE/COLONY COUNT: CPT | Performed by: EMERGENCY MEDICINE

## 2024-12-12 PROCEDURE — 94761 N-INVAS EAR/PLS OXIMETRY MLT: CPT

## 2024-12-12 PROCEDURE — 87186 SC STD MICRODIL/AGAR DIL: CPT | Performed by: EMERGENCY MEDICINE

## 2024-12-12 PROCEDURE — 87635 SARS-COV-2 COVID-19 AMP PRB: CPT | Performed by: EMERGENCY MEDICINE

## 2024-12-12 PROCEDURE — 99283 EMERGENCY DEPT VISIT LOW MDM: CPT | Mod: 25

## 2024-12-12 PROCEDURE — 87088 URINE BACTERIA CULTURE: CPT | Performed by: EMERGENCY MEDICINE

## 2024-12-12 RX ORDER — CLONAZEPAM 0.5 MG/1
0.5 TABLET ORAL 2 TIMES DAILY PRN
Qty: 10 TABLET | Refills: 0 | Status: ON HOLD | OUTPATIENT
Start: 2024-12-12 | End: 2024-12-17 | Stop reason: HOSPADM

## 2024-12-12 RX ORDER — NITROFURANTOIN 25; 75 MG/1; MG/1
100 CAPSULE ORAL 2 TIMES DAILY
Qty: 10 CAPSULE | Refills: 0 | Status: SHIPPED | OUTPATIENT
Start: 2024-12-12 | End: 2024-12-12

## 2024-12-12 RX ORDER — DIAZEPAM 10 MG/2ML
2.5 INJECTION INTRAMUSCULAR
Status: COMPLETED | OUTPATIENT
Start: 2024-12-12 | End: 2024-12-12

## 2024-12-12 RX ORDER — CLONAZEPAM 0.5 MG/1
0.5 TABLET ORAL 2 TIMES DAILY PRN
Qty: 10 TABLET | Refills: 0 | Status: SHIPPED | OUTPATIENT
Start: 2024-12-12 | End: 2024-12-12

## 2024-12-12 RX ORDER — DIAZEPAM 5 MG/1
5 TABLET ORAL
Status: COMPLETED | OUTPATIENT
Start: 2024-12-12 | End: 2024-12-12

## 2024-12-12 RX ORDER — KETOROLAC TROMETHAMINE 30 MG/ML
15 INJECTION, SOLUTION INTRAMUSCULAR; INTRAVENOUS
Status: COMPLETED | OUTPATIENT
Start: 2024-12-12 | End: 2024-12-12

## 2024-12-12 RX ORDER — NITROFURANTOIN 25; 75 MG/1; MG/1
100 CAPSULE ORAL 2 TIMES DAILY
Qty: 10 CAPSULE | Refills: 0 | Status: ON HOLD | OUTPATIENT
Start: 2024-12-12 | End: 2024-12-17 | Stop reason: HOSPADM

## 2024-12-12 RX ORDER — NITROFURANTOIN 25; 75 MG/1; MG/1
100 CAPSULE ORAL
Status: COMPLETED | OUTPATIENT
Start: 2024-12-12 | End: 2024-12-12

## 2024-12-12 RX ORDER — ORPHENADRINE CITRATE 30 MG/ML
60 INJECTION INTRAMUSCULAR; INTRAVENOUS
Status: COMPLETED | OUTPATIENT
Start: 2024-12-12 | End: 2024-12-12

## 2024-12-12 RX ORDER — HYDROMORPHONE HYDROCHLORIDE 1 MG/ML
1 INJECTION, SOLUTION INTRAMUSCULAR; INTRAVENOUS; SUBCUTANEOUS
Status: COMPLETED | OUTPATIENT
Start: 2024-12-12 | End: 2024-12-12

## 2024-12-12 RX ADMIN — HYDROMORPHONE HYDROCHLORIDE 1 MG: 1 INJECTION, SOLUTION INTRAMUSCULAR; INTRAVENOUS; SUBCUTANEOUS at 04:12

## 2024-12-12 RX ADMIN — ORPHENADRINE CITRATE 60 MG: 30 INJECTION, SOLUTION INTRAMUSCULAR; INTRAVENOUS at 02:12

## 2024-12-12 RX ADMIN — DIAZEPAM 2.5 MG: 10 INJECTION, SOLUTION INTRAMUSCULAR; INTRAVENOUS at 04:12

## 2024-12-12 RX ADMIN — KETOROLAC TROMETHAMINE 15 MG: 30 INJECTION, SOLUTION INTRAMUSCULAR; INTRAVENOUS at 02:12

## 2024-12-12 RX ADMIN — DIAZEPAM 5 MG: 5 TABLET ORAL at 11:12

## 2024-12-12 RX ADMIN — NITROFURANTOIN MONOHYDRATE/MACROCRYSTALS 100 MG: 25; 75 CAPSULE ORAL at 06:12

## 2024-12-12 NOTE — ED PROVIDER NOTES
Encounter Date: 12/12/2024       History     Chief Complaint   Patient presents with    Spasms     Patient has a history of body spasms when she has infection. Patient is nonverbal. Daughter verbalizes patient's needs.     Patient has a 79-year-old female, here from home, accompanied by her  and daughter.  Patient has suffered a stroke at an early age which left her without the ability to speak, so daughter communicates for her.  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  The amputation was secondary to blood clots in the leg which became gangrenous.  Daughter states that any time her mother gets an infection, whether it be a urinary tract infection, ear infection, or even a cold, she gets spasms in the stump of her leg.  She began experiencing spasms this morning, so daughter suspects that she has an infection somewhere.  Daughter does state that another family member has some sort of viral illness.  Daughter has not noticed any foul-smelling urine.  No cough, no apparent abdominal pain, no nausea or vomiting etc..      Review of patient's allergies indicates:   Allergen Reactions    Penicillins Other (See Comments)     Unknown     Past Medical History:   Diagnosis Date    Stroke      Past Surgical History:   Procedure Laterality Date    Right Above knee amputation       No family history on file.  Social History     Tobacco Use    Smoking status: Former     Types: Cigarettes    Smokeless tobacco: Never   Substance Use Topics    Alcohol use: Never    Drug use: Never     Review of Systems   Musculoskeletal:  Positive for myalgias (Spasms, right lower extremity).   Psychiatric/Behavioral:  The patient is nervous/anxious.    All other systems reviewed and are negative.      Physical Exam     Initial Vitals [12/12/24 1423]   BP Pulse Resp Temp SpO2   (!) 103/50 86 (!) 22 98.4 °F (36.9 °C) 95 %      MAP       --         Physical Exam    Nursing note and vitals  reviewed.  Constitutional: She appears well-developed and well-nourished. She is not diaphoretic. No distress.   HENT:   Head: Normocephalic and atraumatic.   Right Ear: External ear normal.   Left Ear: External ear normal.   Nose: Nose normal. Mouth/Throat: Oropharynx is clear and moist. No oropharyngeal exudate.   Eyes: Conjunctivae and EOM are normal. Pupils are equal, round, and reactive to light. No scleral icterus.   Neck: Neck supple. No JVD present.   Normal range of motion.  Cardiovascular:  Normal rate, regular rhythm, normal heart sounds and intact distal pulses.           No murmur heard.  Pulmonary/Chest: Breath sounds normal. No stridor. No respiratory distress. She has no wheezes. She has no rhonchi. She has no rales.   Abdominal: Abdomen is soft. Bowel sounds are normal. She exhibits no distension. There is no abdominal tenderness. There is no rebound and no guarding.   Musculoskeletal:         General: No tenderness or edema. Normal range of motion.      Cervical back: Normal range of motion and neck supple.      Comments: Right AKA.  Right upper extremity spastic, moves left lower and left upper extremities normally.  Patient is alert and responds appropriately to requests to open the mouth, breathe deeply etc..     Neurological: She is alert. GCS eye subscore is 4. GCS verbal subscore is 5. GCS motor subscore is 6.   Skin: Skin is warm and dry. Capillary refill takes less than 2 seconds. No rash and no abscess noted. No erythema. No pallor.   Psychiatric: She has a normal mood and affect. Her behavior is normal.         ED Course   Procedures  Labs Reviewed   URINALYSIS, REFLEX TO URINE CULTURE - Abnormal       Result Value    Specimen UA Urine, Catheterized      Color, UA Yellow      Appearance, UA Hazy (*)     pH, UA 6.0      Specific Gravity, UA 1.020      Protein, UA Negative      Glucose, UA Negative      Ketones, UA Trace (*)     Bilirubin (UA) Negative      Occult Blood UA Negative       Nitrite, UA Positive (*)     Urobilinogen, UA Negative      Leukocytes, UA 1+ (*)     Narrative:     Preferred Collection Type->Urine, Clean Catch  Specimen Source->Urine   URINALYSIS MICROSCOPIC - Abnormal    RBC, UA 0      WBC, UA 15 (*)     Bacteria Many (*)     Microscopic Comment SEE COMMENT      Narrative:     Preferred Collection Type->Urine, Clean Catch  Specimen Source->Urine   INFLUENZA A & B BY MOLECULAR    Influenza A, Molecular Negative      Influenza B, Molecular Negative      Flu A & B Source Nasal Swab     CULTURE, URINE   SARS-COV-2 RNA AMPLIFICATION, QUAL    SARS-CoV-2 RNA, Amplification, Qual Negative            Imaging Results    None          Medications   nitrofurantoin (macrocrystal-monohydrate) 100 MG capsule 100 mg (has no administration in time range)   orphenadrine injection 60 mg (60 mg Intramuscular Given 12/12/24 1453)   ketorolac injection 15 mg (15 mg Intramuscular Given 12/12/24 1452)   HYDROmorphone injection 1 mg (1 mg Intramuscular Given 12/12/24 1635)   diazePAM injection 2.5 mg (2.5 mg Intramuscular Given 12/12/24 1643)     Medical Decision Making  Differential includes urinary tract infection, COVID-19, influenza, other viral illness, otitis media, etc.    Physical exam unremarkable, both ears.  Normal, oropharynx is clear, no rhinorrhea etc., COVID and flu were negative.  Eventually urine was obtained, and it does show signs of UTI.  Patient will be started on Macrobid, 1st dose given here.  She is much more comfortable after being given some medications for her anxiety and discomfort.  Patient will follow-up with her PCP, return here as needed or if worse in any way.    Risk  Prescription drug management.                                      Clinical Impression:  Final diagnoses:  [N39.0] Urinary tract infection without hematuria, site unspecified (Primary)  [M62.838] Muscle spasms of lower extremity  [F41.9] Anxiety          ED Disposition Condition    Discharge Stable           ED Prescriptions       Medication Sig Dispense Start Date End Date Auth. Provider    nitrofurantoin, macrocrystal-monohydrate, (MACROBID) 100 MG capsule Take 1 capsule (100 mg total) by mouth 2 (two) times daily. for 5 days 10 capsule 12/12/2024 12/17/2024 Mitchel Marks MD    clonazePAM (KLONOPIN) 0.5 MG tablet Take 1 tablet (0.5 mg total) by mouth 2 (two) times daily as needed for Anxiety. 10 tablet 12/12/2024 12/19/2024 Mitchel Marks MD          Follow-up Information       Follow up With Specialties Details Why Contact Info    Estee Fierro MD Family Medicine Schedule an appointment as soon as possible for a visit   [address] [place] [address]  [address]  [place] [address] [phone]      [facility] Emergency Dept Emergency Medicine  As needed, If symptoms worsen [address] [facility] [address] [phone]             Mitchel Marks MD  12/12/24 0136

## 2024-12-13 ENCOUNTER — HOSPITAL ENCOUNTER (INPATIENT)
Facility: HOSPITAL | Age: 79
LOS: 3 days | Discharge: NURSING, REHAB OR LONG-TERM CARE | DRG: 865 | End: 2024-12-17
Attending: STUDENT IN AN ORGANIZED HEALTH CARE EDUCATION/TRAINING PROGRAM | Admitting: INTERNAL MEDICINE
Payer: MEDICARE

## 2024-12-13 DIAGNOSIS — F41.8 ANXIETY WITH DEPRESSION: ICD-10-CM

## 2024-12-13 DIAGNOSIS — I70.0 AORTIC ATHEROSCLEROSIS: ICD-10-CM

## 2024-12-13 DIAGNOSIS — J44.9 CHRONIC OBSTRUCTIVE PULMONARY DISEASE, UNSPECIFIED COPD TYPE: ICD-10-CM

## 2024-12-13 DIAGNOSIS — I16.0 HYPERTENSIVE URGENCY: ICD-10-CM

## 2024-12-13 DIAGNOSIS — I10 HYPERTENSION: ICD-10-CM

## 2024-12-13 DIAGNOSIS — I69.320 APHASIA AS LATE EFFECT OF CEREBROVASCULAR ACCIDENT: ICD-10-CM

## 2024-12-13 DIAGNOSIS — B37.2 CANDIDAL INTERTRIGO: ICD-10-CM

## 2024-12-13 DIAGNOSIS — R79.89 ELEVATED TROPONIN LEVEL NOT DUE TO ACUTE CORONARY SYNDROME: ICD-10-CM

## 2024-12-13 DIAGNOSIS — R07.9 CHEST PAIN: ICD-10-CM

## 2024-12-13 DIAGNOSIS — Z72.0 TOBACCO ABUSE: ICD-10-CM

## 2024-12-13 DIAGNOSIS — R25.2 LEG CRAMPS: ICD-10-CM

## 2024-12-13 DIAGNOSIS — J09.X9: Primary | ICD-10-CM

## 2024-12-13 DIAGNOSIS — Z89.611 HX OF AKA (ABOVE KNEE AMPUTATION), RIGHT: ICD-10-CM

## 2024-12-13 DIAGNOSIS — G89.29 CHRONIC RIGHT SHOULDER PAIN: ICD-10-CM

## 2024-12-13 DIAGNOSIS — R13.10 DYSPHAGIA, UNSPECIFIED TYPE: ICD-10-CM

## 2024-12-13 DIAGNOSIS — N30.00 ACUTE CYSTITIS WITHOUT HEMATURIA: ICD-10-CM

## 2024-12-13 DIAGNOSIS — I10 MALIGNANT ESSENTIAL HYPERTENSION: ICD-10-CM

## 2024-12-13 DIAGNOSIS — R79.89 TROPONIN LEVEL ELEVATED: ICD-10-CM

## 2024-12-13 DIAGNOSIS — Z86.73 H/O: CVA (CEREBROVASCULAR ACCIDENT): ICD-10-CM

## 2024-12-13 DIAGNOSIS — R56.9 SEIZURES: ICD-10-CM

## 2024-12-13 DIAGNOSIS — M25.511 CHRONIC RIGHT SHOULDER PAIN: ICD-10-CM

## 2024-12-13 DIAGNOSIS — H91.90 HEARING LOSS, UNSPECIFIED HEARING LOSS TYPE, UNSPECIFIED LATERALITY: ICD-10-CM

## 2024-12-13 PROBLEM — M62.838 MUSCLE SPASMS OF LOWER EXTREMITY: Status: ACTIVE | Noted: 2024-12-13

## 2024-12-13 LAB
ALBUMIN SERPL BCP-MCNC: 3.4 G/DL (ref 3.5–5.2)
ALP SERPL-CCNC: 92 U/L (ref 40–150)
ALT SERPL W/O P-5'-P-CCNC: 23 U/L (ref 10–44)
ANION GAP SERPL CALC-SCNC: 12 MMOL/L (ref 8–16)
AST SERPL-CCNC: 91 U/L (ref 10–40)
BASOPHILS # BLD AUTO: 0.02 K/UL (ref 0–0.2)
BASOPHILS NFR BLD: 0.3 % (ref 0–1.9)
BILIRUB SERPL-MCNC: 0.4 MG/DL (ref 0.1–1)
BUN SERPL-MCNC: 23 MG/DL (ref 8–23)
CALCIUM SERPL-MCNC: 8.8 MG/DL (ref 8.7–10.5)
CHLORIDE SERPL-SCNC: 106 MMOL/L (ref 95–110)
CO2 SERPL-SCNC: 19 MMOL/L (ref 23–29)
CREAT SERPL-MCNC: 0.8 MG/DL (ref 0.5–1.4)
DIFFERENTIAL METHOD BLD: ABNORMAL
EOSINOPHIL # BLD AUTO: 0 K/UL (ref 0–0.5)
EOSINOPHIL NFR BLD: 0 % (ref 0–8)
ERYTHROCYTE [DISTWIDTH] IN BLOOD BY AUTOMATED COUNT: 14.9 % (ref 11.5–14.5)
EST. GFR  (NO RACE VARIABLE): >60 ML/MIN/1.73 M^2
GLUCOSE SERPL-MCNC: 67 MG/DL (ref 70–110)
HCT VFR BLD AUTO: 38 % (ref 37–48.5)
HGB BLD-MCNC: 12.2 G/DL (ref 12–16)
IMM GRANULOCYTES # BLD AUTO: 0.02 K/UL (ref 0–0.04)
IMM GRANULOCYTES NFR BLD AUTO: 0.3 % (ref 0–0.5)
LYMPHOCYTES # BLD AUTO: 0.7 K/UL (ref 1–4.8)
LYMPHOCYTES NFR BLD: 8.9 % (ref 18–48)
MCH RBC QN AUTO: 28.1 PG (ref 27–31)
MCHC RBC AUTO-ENTMCNC: 32.1 G/DL (ref 32–36)
MCV RBC AUTO: 88 FL (ref 82–98)
MONOCYTES # BLD AUTO: 0.6 K/UL (ref 0.3–1)
MONOCYTES NFR BLD: 8.1 % (ref 4–15)
NEUTROPHILS # BLD AUTO: 6.1 K/UL (ref 1.8–7.7)
NEUTROPHILS NFR BLD: 82.4 % (ref 38–73)
NRBC BLD-RTO: 0 /100 WBC
PLATELET # BLD AUTO: 235 K/UL (ref 150–450)
PMV BLD AUTO: 9.8 FL (ref 9.2–12.9)
POCT GLUCOSE: 120 MG/DL (ref 70–110)
POTASSIUM SERPL-SCNC: 3.9 MMOL/L (ref 3.5–5.1)
PROT SERPL-MCNC: 7.3 G/DL (ref 6–8.4)
RBC # BLD AUTO: 4.34 M/UL (ref 4–5.4)
SODIUM SERPL-SCNC: 137 MMOL/L (ref 136–145)
TROPONIN I SERPL DL<=0.01 NG/ML-MCNC: 0.23 NG/ML (ref 0–0.03)
TROPONIN I SERPL DL<=0.01 NG/ML-MCNC: 0.24 NG/ML (ref 0–0.03)
WBC # BLD AUTO: 7.43 K/UL (ref 3.9–12.7)

## 2024-12-13 PROCEDURE — 71045 X-RAY EXAM CHEST 1 VIEW: CPT | Mod: 26,,, | Performed by: RADIOLOGY

## 2024-12-13 PROCEDURE — S5010 5% DEXTROSE AND 0.45% SALINE: HCPCS | Performed by: STUDENT IN AN ORGANIZED HEALTH CARE EDUCATION/TRAINING PROGRAM

## 2024-12-13 PROCEDURE — 71045 X-RAY EXAM CHEST 1 VIEW: CPT | Mod: TC

## 2024-12-13 PROCEDURE — 84484 ASSAY OF TROPONIN QUANT: CPT | Mod: 91 | Performed by: STUDENT IN AN ORGANIZED HEALTH CARE EDUCATION/TRAINING PROGRAM

## 2024-12-13 PROCEDURE — 96366 THER/PROPH/DIAG IV INF ADDON: CPT

## 2024-12-13 PROCEDURE — 80053 COMPREHEN METABOLIC PANEL: CPT | Performed by: STUDENT IN AN ORGANIZED HEALTH CARE EDUCATION/TRAINING PROGRAM

## 2024-12-13 PROCEDURE — 93010 ELECTROCARDIOGRAM REPORT: CPT | Mod: ,,, | Performed by: INTERNAL MEDICINE

## 2024-12-13 PROCEDURE — 83880 ASSAY OF NATRIURETIC PEPTIDE: CPT | Performed by: INTERNAL MEDICINE

## 2024-12-13 PROCEDURE — 63600175 PHARM REV CODE 636 W HCPCS: Performed by: STUDENT IN AN ORGANIZED HEALTH CARE EDUCATION/TRAINING PROGRAM

## 2024-12-13 PROCEDURE — 25000003 PHARM REV CODE 250: Performed by: EMERGENCY MEDICINE

## 2024-12-13 PROCEDURE — 93005 ELECTROCARDIOGRAM TRACING: CPT

## 2024-12-13 PROCEDURE — 85025 COMPLETE CBC W/AUTO DIFF WBC: CPT | Performed by: STUDENT IN AN ORGANIZED HEALTH CARE EDUCATION/TRAINING PROGRAM

## 2024-12-13 PROCEDURE — 96376 TX/PRO/DX INJ SAME DRUG ADON: CPT

## 2024-12-13 PROCEDURE — 25000003 PHARM REV CODE 250: Performed by: STUDENT IN AN ORGANIZED HEALTH CARE EDUCATION/TRAINING PROGRAM

## 2024-12-13 PROCEDURE — 99285 EMERGENCY DEPT VISIT HI MDM: CPT | Mod: 25

## 2024-12-13 PROCEDURE — 82962 GLUCOSE BLOOD TEST: CPT

## 2024-12-13 PROCEDURE — 96365 THER/PROPH/DIAG IV INF INIT: CPT

## 2024-12-13 PROCEDURE — 96375 TX/PRO/DX INJ NEW DRUG ADDON: CPT

## 2024-12-13 RX ORDER — LABETALOL HCL 20 MG/4 ML
10 SYRINGE (ML) INTRAVENOUS
Status: COMPLETED | OUTPATIENT
Start: 2024-12-13 | End: 2024-12-13

## 2024-12-13 RX ORDER — QUETIAPINE FUMARATE 25 MG/1
25 TABLET, FILM COATED ORAL ONCE
Status: COMPLETED | OUTPATIENT
Start: 2024-12-13 | End: 2024-12-13

## 2024-12-13 RX ORDER — DEXTROSE MONOHYDRATE AND SODIUM CHLORIDE 5; .45 G/100ML; G/100ML
1000 INJECTION, SOLUTION INTRAVENOUS
Status: COMPLETED | OUTPATIENT
Start: 2024-12-13 | End: 2024-12-13

## 2024-12-13 RX ORDER — LABETALOL HCL 20 MG/4 ML
5 SYRINGE (ML) INTRAVENOUS
Status: COMPLETED | OUTPATIENT
Start: 2024-12-13 | End: 2024-12-13

## 2024-12-13 RX ORDER — ASPIRIN 325 MG
325 TABLET ORAL
Status: COMPLETED | OUTPATIENT
Start: 2024-12-13 | End: 2024-12-13

## 2024-12-13 RX ADMIN — LORAZEPAM 0.5 MG: 2 INJECTION INTRAMUSCULAR; INTRAVENOUS at 10:12

## 2024-12-13 RX ADMIN — LABETALOL HYDROCHLORIDE 10 MG: 5 INJECTION, SOLUTION INTRAVENOUS at 09:12

## 2024-12-13 RX ADMIN — QUETIAPINE FUMARATE 25 MG: 25 TABLET ORAL at 12:12

## 2024-12-13 RX ADMIN — LABETALOL HYDROCHLORIDE 5 MG: 5 INJECTION, SOLUTION INTRAVENOUS at 08:12

## 2024-12-13 RX ADMIN — ASPIRIN 325 MG ORAL TABLET 325 MG: 325 PILL ORAL at 11:12

## 2024-12-13 RX ADMIN — DEXTROSE AND SODIUM CHLORIDE 1000 ML: 5; 450 INJECTION, SOLUTION INTRAVENOUS at 08:12

## 2024-12-13 NOTE — DISCHARGE INSTRUCTIONS
As we discussed, take Macrobid for urinary tract infection.  Continue with all of your other medications, and follow-up with your primary care provider.  Return here as needed or if worse in any way

## 2024-12-13 NOTE — ED PROVIDER NOTES
Encounter Date: 12/12/2024       History     Chief Complaint   Patient presents with    Spasms     PT pointing at right AKA. Family bringing her in for spasms     Patient presents to the emergency department with the other for evaluation of spasms.  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  Daughter states that every time she gets an infection she gets spasms that caused her to jerk.  She states that her spasms of gotten so bad this evening she is having hard time keeping her in bed or in a wheelchair.  She states the patient was seen earlier today and diagnosed with a urinary tract infection.  She has not been running a fever at home.  She has had no vomiting at home.  Daughter denies any other complaints.    The history is provided by a relative.     Review of patient's allergies indicates:   Allergen Reactions    Penicillins Other (See Comments)     Unknown     Past Medical History:   Diagnosis Date    Stroke      Past Surgical History:   Procedure Laterality Date    Right Above knee amputation       No family history on file.  Social History     Tobacco Use    Smoking status: Former     Types: Cigarettes    Smokeless tobacco: Never   Substance Use Topics    Alcohol use: Never    Drug use: Never     Review of Systems   Constitutional:  Negative for activity change, appetite change, chills and fever.   HENT:  Negative for congestion, ear discharge, ear pain, nosebleeds, sore throat and voice change.    Eyes:  Negative for photophobia, pain and discharge.   Respiratory:  Negative for cough, chest tightness, shortness of breath and wheezing.    Cardiovascular:  Negative for chest pain and palpitations.   Gastrointestinal:  Negative for abdominal pain, constipation, nausea and vomiting.   Genitourinary:  Negative for dysuria, flank pain, frequency and urgency.   Musculoskeletal:  Negative for back pain and neck pain.   Skin:  Negative for rash and wound.   Neurological:  Negative for dizziness,  seizures, weakness and headaches.   All other systems reviewed and are negative.      Physical Exam     Initial Vitals [12/12/24 2352]   BP Pulse Resp Temp SpO2   (!) 104/51 78 16 98.2 °F (36.8 °C) (!) 92 %      MAP       --         Physical Exam    Nursing note and vitals reviewed.  Constitutional: She appears well-developed and well-nourished.   HENT:   Head: Normocephalic and atraumatic.   Right Ear: External ear normal.   Left Ear: External ear normal.   Nose: Nose normal. Mouth/Throat: Oropharynx is clear and moist.   Eyes: Conjunctivae and EOM are normal. Pupils are equal, round, and reactive to light.   Neck: Neck supple. No tracheal deviation present.   Normal range of motion.  Cardiovascular:  Normal rate, regular rhythm and normal heart sounds.           Pulmonary/Chest: Breath sounds normal. She has no wheezes.   Abdominal: Abdomen is soft. Bowel sounds are normal. There is no abdominal tenderness.   Musculoskeletal:         General: No tenderness. Normal range of motion.      Cervical back: Normal range of motion and neck supple.      Comments: Right AKA     Neurological: She is alert.   Patient has occasional jerking type spasms the cause her to jerk forward from a sitting position.   Skin: Skin is warm and dry. Capillary refill takes less than 2 seconds. No rash noted.         ED Course   Procedures  Labs Reviewed - No data to display       Imaging Results    None          Medications   diazePAM tablet 5 mg (5 mg Oral Given 12/12/24 8566)   QUEtiapine tablet 25 mg (25 mg Oral Given 12/13/24 0038)     Medical Decision Making  History is obtained from the daughter.  Labs that were performed earlier today reviewed by myself.  Differential diagnosis includes, but is not limited to, muscle spasm/tremor/seizure activity  Patient has had a previous stroke and is aphasic which may limit her access to healthcare.    Daughter states that the patient tends to have lowered oxygen saturations with Ativan, so I have  elected to give the patient some Valium to begin with this evening.    Patient was re-evaluated at 12:20 a.m..  Daughter states that her spasms are greatly improved but she still has some.  I discussed treatment options with the daughter interval limited to give her 25 of Seroquel to see if we can get her to rest a little bit of the evening.    I offered to write the family some Valium for use at home since it seemed to work so well but they state that they are going to see her doctor in 2 days and if they need the medication they will ask for it from her.    Risk  Prescription drug management.                                      Clinical Impression:  Final diagnoses:  [M62.838] Muscle spasm (Primary)          ED Disposition Condition    Discharge Stable          ED Prescriptions    None       Follow-up Information       Follow up With Specialties Details Why Contact Info    Estee Fierro MD Family Medicine Schedule an appointment as soon as possible for a visit   [address] [facility]  [address]  [place] [address]  [phone]               Tayo Hale, DO  12/13/24 0044

## 2024-12-13 NOTE — DISCHARGE INSTRUCTIONS
Continue home medications.  Follow up with your primary care provider.  Return to the emergency review any further problems.

## 2024-12-14 PROBLEM — J09.X9: Status: ACTIVE | Noted: 2024-12-14

## 2024-12-14 PROBLEM — J44.9 COPD (CHRONIC OBSTRUCTIVE PULMONARY DISEASE): Status: ACTIVE | Noted: 2024-12-14

## 2024-12-14 PROBLEM — R79.89 ELEVATED TROPONIN LEVEL NOT DUE TO ACUTE CORONARY SYNDROME: Status: ACTIVE | Noted: 2024-12-14

## 2024-12-14 PROBLEM — I69.398 SEIZURE DISORDER AS SEQUELA OF CEREBROVASCULAR ACCIDENT: Status: ACTIVE | Noted: 2024-12-14

## 2024-12-14 PROBLEM — G40.909 SEIZURE DISORDER AS SEQUELA OF CEREBROVASCULAR ACCIDENT: Status: ACTIVE | Noted: 2024-12-14

## 2024-12-14 PROBLEM — R41.82 ALTERED MENTAL STATUS: Status: ACTIVE | Noted: 2024-12-14

## 2024-12-14 PROBLEM — E16.2 HYPOGLYCEMIA: Status: ACTIVE | Noted: 2024-12-14

## 2024-12-14 PROBLEM — J09.X9: Status: RESOLVED | Noted: 2024-12-14 | Resolved: 2024-12-14

## 2024-12-14 LAB
ALBUMIN SERPL BCP-MCNC: 2.8 G/DL (ref 3.5–5.2)
ALLENS TEST: ABNORMAL
ALP SERPL-CCNC: 76 U/L (ref 40–150)
ALT SERPL W/O P-5'-P-CCNC: 24 U/L (ref 10–44)
ANION GAP SERPL CALC-SCNC: 9 MMOL/L (ref 8–16)
AST SERPL-CCNC: 94 U/L (ref 10–40)
BASOPHILS # BLD AUTO: 0.03 K/UL (ref 0–0.2)
BASOPHILS NFR BLD: 0.5 % (ref 0–1.9)
BILIRUB SERPL-MCNC: 0.3 MG/DL (ref 0.1–1)
BNP SERPL-MCNC: 256 PG/ML (ref 0–99)
BUN SERPL-MCNC: 20 MG/DL (ref 8–23)
CALCIUM SERPL-MCNC: 8.1 MG/DL (ref 8.7–10.5)
CHLORIDE SERPL-SCNC: 105 MMOL/L (ref 95–110)
CK SERPL-CCNC: 1917 U/L (ref 20–180)
CO2 SERPL-SCNC: 19 MMOL/L (ref 23–29)
CREAT SERPL-MCNC: 0.8 MG/DL (ref 0.5–1.4)
DELSYS: ABNORMAL
DIFFERENTIAL METHOD BLD: ABNORMAL
EOSINOPHIL # BLD AUTO: 0 K/UL (ref 0–0.5)
EOSINOPHIL NFR BLD: 0.2 % (ref 0–8)
ERYTHROCYTE [DISTWIDTH] IN BLOOD BY AUTOMATED COUNT: 15 % (ref 11.5–14.5)
EST. GFR  (NO RACE VARIABLE): >60 ML/MIN/1.73 M^2
FIO2: 21
GLUCOSE SERPL-MCNC: 100 MG/DL (ref 70–110)
HCO3 UR-SCNC: 19.6 MMOL/L (ref 24–28)
HCT VFR BLD AUTO: 35.2 % (ref 37–48.5)
HGB BLD-MCNC: 11.1 G/DL (ref 12–16)
IMM GRANULOCYTES # BLD AUTO: 0.02 K/UL (ref 0–0.04)
IMM GRANULOCYTES NFR BLD AUTO: 0.3 % (ref 0–0.5)
INFLUENZA A, MOLECULAR: POSITIVE
INFLUENZA B, MOLECULAR: NEGATIVE
LACTATE SERPL-SCNC: 1.3 MMOL/L (ref 0.5–2.2)
LYMPHOCYTES # BLD AUTO: 0.6 K/UL (ref 1–4.8)
LYMPHOCYTES NFR BLD: 9 % (ref 18–48)
MAGNESIUM SERPL-MCNC: 2.2 MG/DL (ref 1.6–2.6)
MCH RBC QN AUTO: 28.2 PG (ref 27–31)
MCHC RBC AUTO-ENTMCNC: 31.5 G/DL (ref 32–36)
MCV RBC AUTO: 90 FL (ref 82–98)
MODE: ABNORMAL
MONOCYTES # BLD AUTO: 0.6 K/UL (ref 0.3–1)
MONOCYTES NFR BLD: 10.1 % (ref 4–15)
NEUTROPHILS # BLD AUTO: 5 K/UL (ref 1.8–7.7)
NEUTROPHILS NFR BLD: 79.9 % (ref 38–73)
NRBC BLD-RTO: 0 /100 WBC
PCO2 BLDA: 31.9 MMHG (ref 35–45)
PH SMN: 7.4 [PH] (ref 7.35–7.45)
PHOSPHATE SERPL-MCNC: 3.4 MG/DL (ref 2.7–4.5)
PLATELET # BLD AUTO: 205 K/UL (ref 150–450)
PMV BLD AUTO: 9.1 FL (ref 9.2–12.9)
PO2 BLDA: 63 MMHG (ref 80–100)
POC BE: -5 MMOL/L
POC SATURATED O2: 92 % (ref 95–100)
POC TCO2: 21 MMOL/L (ref 23–27)
POTASSIUM SERPL-SCNC: 3.5 MMOL/L (ref 3.5–5.1)
PROT SERPL-MCNC: 6.2 G/DL (ref 6–8.4)
RBC # BLD AUTO: 3.93 M/UL (ref 4–5.4)
SAMPLE: ABNORMAL
SARS-COV-2 RDRP RESP QL NAA+PROBE: NEGATIVE
SITE: ABNORMAL
SODIUM SERPL-SCNC: 133 MMOL/L (ref 136–145)
SP02: 95
SPECIMEN SOURCE: ABNORMAL
TROPONIN I SERPL DL<=0.01 NG/ML-MCNC: 0.25 NG/ML (ref 0–0.03)
WBC # BLD AUTO: 6.31 K/UL (ref 3.9–12.7)

## 2024-12-14 PROCEDURE — 63600175 PHARM REV CODE 636 W HCPCS: Performed by: INTERNAL MEDICINE

## 2024-12-14 PROCEDURE — 85025 COMPLETE CBC W/AUTO DIFF WBC: CPT | Performed by: INTERNAL MEDICINE

## 2024-12-14 PROCEDURE — 94640 AIRWAY INHALATION TREATMENT: CPT

## 2024-12-14 PROCEDURE — 21400001 HC TELEMETRY ROOM

## 2024-12-14 PROCEDURE — 94799 UNLISTED PULMONARY SVC/PX: CPT

## 2024-12-14 PROCEDURE — 25000242 PHARM REV CODE 250 ALT 637 W/ HCPCS: Performed by: INTERNAL MEDICINE

## 2024-12-14 PROCEDURE — 25000003 PHARM REV CODE 250: Performed by: INTERNAL MEDICINE

## 2024-12-14 PROCEDURE — 82803 BLOOD GASES ANY COMBINATION: CPT

## 2024-12-14 PROCEDURE — 94761 N-INVAS EAR/PLS OXIMETRY MLT: CPT | Mod: XB

## 2024-12-14 PROCEDURE — 82550 ASSAY OF CK (CPK): CPT | Performed by: INTERNAL MEDICINE

## 2024-12-14 PROCEDURE — 99900035 HC TECH TIME PER 15 MIN (STAT)

## 2024-12-14 PROCEDURE — 36600 WITHDRAWAL OF ARTERIAL BLOOD: CPT

## 2024-12-14 PROCEDURE — 83605 ASSAY OF LACTIC ACID: CPT | Performed by: INTERNAL MEDICINE

## 2024-12-14 PROCEDURE — 87040 BLOOD CULTURE FOR BACTERIA: CPT | Mod: 59 | Performed by: INTERNAL MEDICINE

## 2024-12-14 PROCEDURE — 84100 ASSAY OF PHOSPHORUS: CPT | Performed by: INTERNAL MEDICINE

## 2024-12-14 PROCEDURE — 87635 SARS-COV-2 COVID-19 AMP PRB: CPT | Performed by: STUDENT IN AN ORGANIZED HEALTH CARE EDUCATION/TRAINING PROGRAM

## 2024-12-14 PROCEDURE — 80053 COMPREHEN METABOLIC PANEL: CPT | Performed by: INTERNAL MEDICINE

## 2024-12-14 PROCEDURE — 99223 1ST HOSP IP/OBS HIGH 75: CPT | Mod: AI,,, | Performed by: INTERNAL MEDICINE

## 2024-12-14 PROCEDURE — 87502 INFLUENZA DNA AMP PROBE: CPT | Performed by: STUDENT IN AN ORGANIZED HEALTH CARE EDUCATION/TRAINING PROGRAM

## 2024-12-14 PROCEDURE — 83735 ASSAY OF MAGNESIUM: CPT | Performed by: INTERNAL MEDICINE

## 2024-12-14 PROCEDURE — 84484 ASSAY OF TROPONIN QUANT: CPT | Performed by: INTERNAL MEDICINE

## 2024-12-14 RX ORDER — SODIUM,POTASSIUM PHOSPHATES 280-250MG
2 POWDER IN PACKET (EA) ORAL
Status: DISCONTINUED | OUTPATIENT
Start: 2024-12-14 | End: 2024-12-16

## 2024-12-14 RX ORDER — AMOXICILLIN 250 MG
1 CAPSULE ORAL 2 TIMES DAILY PRN
Status: DISCONTINUED | OUTPATIENT
Start: 2024-12-14 | End: 2024-12-16

## 2024-12-14 RX ORDER — IBUPROFEN 200 MG
16 TABLET ORAL
Status: DISCONTINUED | OUTPATIENT
Start: 2024-12-14 | End: 2024-12-17 | Stop reason: HOSPADM

## 2024-12-14 RX ORDER — SIMETHICONE 80 MG
1 TABLET,CHEWABLE ORAL 4 TIMES DAILY PRN
Status: DISCONTINUED | OUTPATIENT
Start: 2024-12-14 | End: 2024-12-14

## 2024-12-14 RX ORDER — LEVETIRACETAM 500 MG/5ML
500 INJECTION, SOLUTION, CONCENTRATE INTRAVENOUS EVERY 12 HOURS
Status: DISCONTINUED | OUTPATIENT
Start: 2024-12-14 | End: 2024-12-15

## 2024-12-14 RX ORDER — ACETAMINOPHEN 650 MG/1
650 SUPPOSITORY RECTAL EVERY 6 HOURS PRN
Status: DISCONTINUED | OUTPATIENT
Start: 2024-12-14 | End: 2024-12-16

## 2024-12-14 RX ORDER — GLUCAGON 1 MG
1 KIT INJECTION
Status: DISCONTINUED | OUTPATIENT
Start: 2024-12-14 | End: 2024-12-17 | Stop reason: HOSPADM

## 2024-12-14 RX ORDER — IPRATROPIUM BROMIDE AND ALBUTEROL SULFATE 2.5; .5 MG/3ML; MG/3ML
3 SOLUTION RESPIRATORY (INHALATION) EVERY 8 HOURS
Status: DISCONTINUED | OUTPATIENT
Start: 2024-12-14 | End: 2024-12-17 | Stop reason: HOSPADM

## 2024-12-14 RX ORDER — SODIUM CHLORIDE 0.9 % (FLUSH) 0.9 %
10 SYRINGE (ML) INJECTION EVERY 12 HOURS PRN
Status: DISCONTINUED | OUTPATIENT
Start: 2024-12-14 | End: 2024-12-17 | Stop reason: HOSPADM

## 2024-12-14 RX ORDER — LEVETIRACETAM 250 MG/1
500 TABLET ORAL DAILY
Status: DISCONTINUED | OUTPATIENT
Start: 2024-12-14 | End: 2024-12-14

## 2024-12-14 RX ORDER — LANOLIN ALCOHOL/MO/W.PET/CERES
800 CREAM (GRAM) TOPICAL
Status: DISCONTINUED | OUTPATIENT
Start: 2024-12-14 | End: 2024-12-14

## 2024-12-14 RX ORDER — ASPIRIN 325 MG
325 TABLET, DELAYED RELEASE (ENTERIC COATED) ORAL DAILY
Status: DISCONTINUED | OUTPATIENT
Start: 2024-12-14 | End: 2024-12-14

## 2024-12-14 RX ORDER — ACETAMINOPHEN 325 MG/1
650 TABLET ORAL EVERY 6 HOURS PRN
Status: DISCONTINUED | OUTPATIENT
Start: 2024-12-14 | End: 2024-12-14

## 2024-12-14 RX ORDER — ONDANSETRON HYDROCHLORIDE 2 MG/ML
4 INJECTION, SOLUTION INTRAVENOUS EVERY 8 HOURS PRN
Status: DISCONTINUED | OUTPATIENT
Start: 2024-12-14 | End: 2024-12-17 | Stop reason: HOSPADM

## 2024-12-14 RX ORDER — ALUMINUM HYDROXIDE, MAGNESIUM HYDROXIDE, AND SIMETHICONE 1200; 120; 1200 MG/30ML; MG/30ML; MG/30ML
30 SUSPENSION ORAL 4 TIMES DAILY PRN
Status: DISCONTINUED | OUTPATIENT
Start: 2024-12-14 | End: 2024-12-14

## 2024-12-14 RX ORDER — MORPHINE SULFATE 2 MG/ML
2 INJECTION, SOLUTION INTRAMUSCULAR; INTRAVENOUS EVERY 4 HOURS PRN
Status: DISCONTINUED | OUTPATIENT
Start: 2024-12-14 | End: 2024-12-16

## 2024-12-14 RX ORDER — DULOXETIN HYDROCHLORIDE 20 MG/1
20 CAPSULE, DELAYED RELEASE ORAL NIGHTLY
Status: DISCONTINUED | OUTPATIENT
Start: 2024-12-14 | End: 2024-12-17 | Stop reason: HOSPADM

## 2024-12-14 RX ORDER — LANOLIN ALCOHOL/MO/W.PET/CERES
800 CREAM (GRAM) TOPICAL
Status: DISCONTINUED | OUTPATIENT
Start: 2024-12-14 | End: 2024-12-16

## 2024-12-14 RX ORDER — DEXTROSE MONOHYDRATE AND SODIUM CHLORIDE 5; .9 G/100ML; G/100ML
INJECTION, SOLUTION INTRAVENOUS CONTINUOUS
Status: DISCONTINUED | OUTPATIENT
Start: 2024-12-14 | End: 2024-12-15

## 2024-12-14 RX ORDER — LORAZEPAM 0.5 MG/1
0.5 TABLET ORAL EVERY 6 HOURS PRN
Status: DISCONTINUED | OUTPATIENT
Start: 2024-12-14 | End: 2024-12-16

## 2024-12-14 RX ORDER — LEVOFLOXACIN 5 MG/ML
750 INJECTION, SOLUTION INTRAVENOUS
Status: DISCONTINUED | OUTPATIENT
Start: 2024-12-14 | End: 2024-12-16 | Stop reason: CLARIF

## 2024-12-14 RX ORDER — BACLOFEN 5 MG/1
5 TABLET ORAL 3 TIMES DAILY PRN
Status: DISCONTINUED | OUTPATIENT
Start: 2024-12-14 | End: 2024-12-16

## 2024-12-14 RX ORDER — OSELTAMIVIR PHOSPHATE 75 MG/1
75 CAPSULE ORAL ONCE
Status: DISCONTINUED | OUTPATIENT
Start: 2024-12-14 | End: 2024-12-16

## 2024-12-14 RX ORDER — TALC
6 POWDER (GRAM) TOPICAL NIGHTLY PRN
Status: DISCONTINUED | OUTPATIENT
Start: 2024-12-14 | End: 2024-12-17 | Stop reason: HOSPADM

## 2024-12-14 RX ORDER — PROCHLORPERAZINE EDISYLATE 5 MG/ML
5 INJECTION INTRAMUSCULAR; INTRAVENOUS EVERY 6 HOURS PRN
Status: DISCONTINUED | OUTPATIENT
Start: 2024-12-14 | End: 2024-12-17 | Stop reason: HOSPADM

## 2024-12-14 RX ORDER — HYDRALAZINE HYDROCHLORIDE 20 MG/ML
10 INJECTION INTRAMUSCULAR; INTRAVENOUS EVERY 6 HOURS PRN
Status: DISCONTINUED | OUTPATIENT
Start: 2024-12-14 | End: 2024-12-17

## 2024-12-14 RX ORDER — SIMETHICONE 80 MG
1 TABLET,CHEWABLE ORAL 4 TIMES DAILY PRN
Status: DISCONTINUED | OUTPATIENT
Start: 2024-12-14 | End: 2024-12-16

## 2024-12-14 RX ORDER — ASPIRIN 325 MG
325 TABLET ORAL DAILY
Status: DISCONTINUED | OUTPATIENT
Start: 2024-12-14 | End: 2024-12-16

## 2024-12-14 RX ORDER — LEVETIRACETAM 100 MG/ML
650 SOLUTION ORAL NIGHTLY
Status: DISCONTINUED | OUTPATIENT
Start: 2024-12-14 | End: 2024-12-14

## 2024-12-14 RX ORDER — SODIUM,POTASSIUM PHOSPHATES 280-250MG
2 POWDER IN PACKET (EA) ORAL
Status: DISCONTINUED | OUTPATIENT
Start: 2024-12-14 | End: 2024-12-14

## 2024-12-14 RX ORDER — BACLOFEN 5 MG/1
5 TABLET ORAL 3 TIMES DAILY PRN
Status: DISCONTINUED | OUTPATIENT
Start: 2024-12-14 | End: 2024-12-14

## 2024-12-14 RX ORDER — PANTOPRAZOLE SODIUM 40 MG/1
40 TABLET, DELAYED RELEASE ORAL DAILY
Status: DISCONTINUED | OUTPATIENT
Start: 2024-12-14 | End: 2024-12-14

## 2024-12-14 RX ORDER — ENOXAPARIN SODIUM 100 MG/ML
40 INJECTION SUBCUTANEOUS EVERY 24 HOURS
Status: DISCONTINUED | OUTPATIENT
Start: 2024-12-14 | End: 2024-12-17 | Stop reason: HOSPADM

## 2024-12-14 RX ORDER — IPRATROPIUM BROMIDE AND ALBUTEROL SULFATE 2.5; .5 MG/3ML; MG/3ML
3 SOLUTION RESPIRATORY (INHALATION) EVERY 4 HOURS PRN
Status: DISCONTINUED | OUTPATIENT
Start: 2024-12-14 | End: 2024-12-17 | Stop reason: HOSPADM

## 2024-12-14 RX ORDER — AMOXICILLIN 250 MG
1 CAPSULE ORAL 2 TIMES DAILY PRN
Status: DISCONTINUED | OUTPATIENT
Start: 2024-12-14 | End: 2024-12-14

## 2024-12-14 RX ORDER — ALUMINUM HYDROXIDE, MAGNESIUM HYDROXIDE, AND SIMETHICONE 1200; 120; 1200 MG/30ML; MG/30ML; MG/30ML
30 SUSPENSION ORAL 4 TIMES DAILY PRN
Status: DISCONTINUED | OUTPATIENT
Start: 2024-12-14 | End: 2024-12-17 | Stop reason: HOSPADM

## 2024-12-14 RX ORDER — LEVETIRACETAM 100 MG/ML
125 SOLUTION ORAL
Status: DISCONTINUED | OUTPATIENT
Start: 2024-12-14 | End: 2024-12-14

## 2024-12-14 RX ORDER — ACETAMINOPHEN 325 MG/1
650 TABLET ORAL EVERY 6 HOURS PRN
Status: DISCONTINUED | OUTPATIENT
Start: 2024-12-14 | End: 2024-12-16

## 2024-12-14 RX ORDER — NALOXONE HCL 0.4 MG/ML
0.02 VIAL (ML) INJECTION
Status: DISCONTINUED | OUTPATIENT
Start: 2024-12-14 | End: 2024-12-17 | Stop reason: HOSPADM

## 2024-12-14 RX ORDER — IBUPROFEN 200 MG
24 TABLET ORAL
Status: DISCONTINUED | OUTPATIENT
Start: 2024-12-14 | End: 2024-12-17 | Stop reason: HOSPADM

## 2024-12-14 RX ADMIN — IPRATROPIUM BROMIDE AND ALBUTEROL SULFATE 3 ML: .5; 2.5 SOLUTION RESPIRATORY (INHALATION) at 07:12

## 2024-12-14 RX ADMIN — BACLOFEN 5 MG: 10 TABLET ORAL at 02:12

## 2024-12-14 RX ADMIN — SENNOSIDES AND DOCUSATE SODIUM 1 TABLET: 50; 8.6 TABLET ORAL at 08:12

## 2024-12-14 RX ADMIN — MORPHINE SULFATE 2 MG: 2 INJECTION, SOLUTION INTRAMUSCULAR; INTRAVENOUS at 04:12

## 2024-12-14 RX ADMIN — ACETAMINOPHEN 650 MG: 650 SUPPOSITORY RECTAL at 01:12

## 2024-12-14 RX ADMIN — ASPIRIN 325 MG ORAL TABLET 325 MG: 325 PILL ORAL at 08:12

## 2024-12-14 RX ADMIN — LEVETIRACETAM 500 MG: 100 INJECTION, SOLUTION INTRAVENOUS at 08:12

## 2024-12-14 RX ADMIN — SIMETHICONE 80 MG: 80 TABLET, CHEWABLE ORAL at 11:12

## 2024-12-14 RX ADMIN — DEXTROSE AND SODIUM CHLORIDE: 5; 900 INJECTION, SOLUTION INTRAVENOUS at 03:12

## 2024-12-14 RX ADMIN — MORPHINE SULFATE 2 MG: 2 INJECTION, SOLUTION INTRAMUSCULAR; INTRAVENOUS at 08:12

## 2024-12-14 RX ADMIN — LORAZEPAM 0.5 MG: 0.5 TABLET ORAL at 05:12

## 2024-12-14 RX ADMIN — HYDRALAZINE HYDROCHLORIDE 10 MG: 20 INJECTION, SOLUTION INTRAMUSCULAR; INTRAVENOUS at 10:12

## 2024-12-14 RX ADMIN — HYDRALAZINE HYDROCHLORIDE 10 MG: 20 INJECTION, SOLUTION INTRAMUSCULAR; INTRAVENOUS at 04:12

## 2024-12-14 RX ADMIN — DULOXETINE HYDROCHLORIDE 20 MG: 20 CAPSULE, DELAYED RELEASE ORAL at 08:12

## 2024-12-14 RX ADMIN — LEVOFLOXACIN 750 MG: 750 INJECTION, SOLUTION INTRAVENOUS at 12:12

## 2024-12-14 RX ADMIN — ACETAMINOPHEN 650 MG: 325 TABLET ORAL at 11:12

## 2024-12-14 RX ADMIN — IPRATROPIUM BROMIDE AND ALBUTEROL SULFATE 3 ML: .5; 2.5 SOLUTION RESPIRATORY (INHALATION) at 03:12

## 2024-12-14 NOTE — PLAN OF CARE
Problem: Adult Inpatient Plan of Care  Goal: Plan of Care Review  12/14/2024 0452 by Leslie Amaya RN  Outcome: Progressing  12/14/2024 0355 by Leslie Amaya RN  Outcome: Progressing  12/14/2024 0354 by Leslie Amaya, RN  Outcome: Progressing  Goal: Patient-Specific Goal (Individualized)  12/14/2024 0452 by Leslie Amaya, RN  Outcome: Progressing  12/14/2024 0355 by Leslie Amaya, RN  Outcome: Progressing  12/14/2024 0354 by Leslie Amaya, RN  Outcome: Progressing  Goal: Absence of Hospital-Acquired Illness or Injury  12/14/2024 0452 by Leslie Amaya RN  Outcome: Progressing  12/14/2024 0355 by Leslie Amaya, RN  Outcome: Progressing  12/14/2024 0354 by Leslie Amaya RN  Outcome: Progressing  Goal: Optimal Comfort and Wellbeing  12/14/2024 0452 by Leslie Amaya RN  Outcome: Progressing  12/14/2024 0355 by Leslie Amaya, RN  Outcome: Progressing  12/14/2024 0354 by Leslie Amaya RN  Outcome: Progressing  Goal: Readiness for Transition of Care  12/14/2024 0452 by Leslie Amaya, RN  Outcome: Progressing  12/14/2024 0355 by Leslie Amaya, RN  Outcome: Progressing  12/14/2024 0354 by Leslie Amaya, RN  Outcome: Progressing     Problem: Infection  Goal: Absence of Infection Signs and Symptoms  12/14/2024 0452 by Leslie Amaya, RN  Outcome: Progressing  12/14/2024 0355 by Leslie Amaya, RN  Outcome: Progressing  12/14/2024 0354 by Leslie Amaya, RN  Outcome: Progressing     Problem: Skin Injury Risk Increased  Goal: Skin Health and Integrity  12/14/2024 0452 by Leslie Amaya, RN  Outcome: Progressing  12/14/2024 0355 by Leslie Amaya RN  Outcome: Progressing  12/14/2024 0354 by Leslie Amaya RN  Outcome: Progressing

## 2024-12-14 NOTE — ASSESSMENT & PLAN NOTE
In the ED her VS were BP (!) 228/118 -> 225/98 -> 175/75 -> 152/95   She has no history of HTN, so suspect this may be reactive to her uncontrolled spasms and UTI   -Her daughter states this has happened in the past  Check CT head without contrast stat to be sure nothing central  Her BP has come down nicely, so will follow and treat PRN for now, as she drops fast at times as well per daughter  Stat CXR and BNP    In the ED she was treated with:  Labetalol 20 mg/4 mL (5 mg/mL) IV syring (10 mg Intravenous Given 12/13/24 2151)  Labetalol 20 mg/4 mL (5 mg/mL) IV syring (5 mg Intravenous Given 12/13/24 2002)

## 2024-12-14 NOTE — NURSING
Attempted to place NG tube x4 with assist of other nurse.  Pt bared down and it would not go any further.  Shook head no, tried to grab with left hand, said I don't want it.  Daughter at bedside.  Dr. Byrd notified.  No further attempt this shift.  Will monitor mentation and see if can re attempt a bedside swallow test.

## 2024-12-14 NOTE — ASSESSMENT & PLAN NOTE
I suspect this is type II ischemia due to elevated BP  So far this is flat  Continue daily ASA 325mg po - dose given in ED  Trend trop  Ordered TTE in the am

## 2024-12-14 NOTE — H&P
"[facility] Medicine  History & Physical    Patient Name: Meaghan Pan  MRN: [id number]  Admission Date: 12/13/2024  Attending Physician: Sami Etienne DO   Primary Care Provider: Estee Fierro MD         Patient information was obtained from relative(s), past medical records, and ER records.       Subjective:     Principal Problem:Malignant essential hypertension    Chief Complaint:   Chief Complaint   Patient presents with    Hypertension     Patient seen here twice in the last 24 hours for spasms to right leg/above the knee amputation.  Patient noted to have hypertension at home (190/105).  Patient appears to have routine spasm of set leg.  Patient holding her breath at times per family.          HPI: Ms. 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    She lives with her daughter, who notes that she suffered a stroke at age 41.  Apparently she was on hormone therapy and smoked which is suspected source of stroke. She suffered aphasia and right side hemiparesis following her stroke years ago. 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  She is followed at [facility], last seeing them in clinic on 12/4/24 for Botox therapy and adjustment of seizure medications.  At that visit her Keppra was increased to 500 mg qam, 125mg at 3pm, and 625 mg nightly.    She was seen in the ED x 2 on 12/12/24, the first time for UTI and muscle spasms treated with Klonopin and Macrobid.  She later returned on the same day with severe worsening of her muscle spasms, treated with Seroquel 25mg and Valium 5mg.  Her daughter has been giving her the Klonopin from the ED, but had to stop due to profound fatigue and lethargy.  She has seen mild improvement in her spasms today, " "but the real reason she brought her back was due to elevated BP at home.     Ms. Pan is a bit lethargic and unable to speak on exam.  Her daughter found her cheeks flushed, which is common when her BP shoots up (has had this happen in the past).  She checked it at home and found it to be 190/105, which frightened her and made her come to the ED.  She has seen no N/V/D, fever, chills, but does not hot flashes.      In the ED her VS were BP (!) 228/118 -> 225/98 -> 175/75 -> 152/95   Pulse 98 -> 85   Temp 98.5 °F (36.9 °C) (Axillary)   Resp (!) 24   Ht 5' 2" (1.575 m)   Wt 55 kg (121 lb 4.1 oz)   SpO2 95% RA  BMI 22.18 kg/m².  Labs showed NML CBC, Cr 0.8, Gluc 67 -> 120, Alb 3.4, Trop 0.233, COVID/FLU NEG, UA: nitrite +, LE 1+, WBC 15, many bacteria.    No radiographs done in the ED.  EKG showed NSR rate 90, NS ST-T changes, QTc 479 ms.    In the ED she was treated with:  Medications   labetalol 20 mg/4 mL (5 mg/mL) IV syring (5 mg Intravenous Given 12/13/24 2002)   dextrose 5 % and 0.45 % NaCl infusion (0 mLs Intravenous Stopped 12/13/24 2216)   labetalol 20 mg/4 mL (5 mg/mL) IV syring (10 mg Intravenous Given 12/13/24 2151)   LORazepam (ATIVAN) injection 0.5 mg (0.5 mg Intravenous Given 12/13/24 2208)   aspirin tablet 325 mg (325 mg Oral Given 12/13/24 2305)               Past Medical History:   Diagnosis Date    Anxiety disorder, unspecified     Aphasia as late effect of cerebrovascular accident     COPD (chronic obstructive pulmonary disease)     GERD (gastroesophageal reflux disease)     Hyponatremia     Her sodium was low and Tegretol and HCTZ was discontinued. Her sodium has returned to normal.    Muscle spasm of right lower extremity     Spasticity: They feel Botox was more effective than Xeomin. This has been helpful with comfort and hygiene. Additional units for torticollis and in the pec helped with range of motion in neck and pain when raising left arm    Neuropathy     Pancreatic cyst     " Seizure disorder     Spastic monoplegia of upper extremity     On right since the stroke    Stroke     old left MCA territory stroke with encephalomalacia    Unspecified glaucoma        Past Surgical History:   Procedure Laterality Date    HYSTERECTOMY      Right Above knee amputation         Review of patient's allergies indicates:   Allergen Reactions    Penicillins Other (See Comments)     Unknown       Current Facility-Administered Medications on File Prior to Encounter   Medication    [COMPLETED] QUEtiapine tablet 25 mg     Current Outpatient Medications on File Prior to Encounter   Medication Sig    albuterol (PROVENTIL) 2.5 mg /3 mL (0.083 %) nebulizer solution Take 3 mLs (2.5 mg total) by nebulization every 6 (six) hours as needed for Wheezing. Rescue (Patient taking differently: Take 2.5 mg by nebulization every 6 (six) hours as needed for Wheezing or Shortness of Breath. Rescue)    aspirin (ECOTRIN) 325 MG EC tablet Take 325 mg by mouth once daily.    baclofen (LIORESAL) 5 mg Tab tablet Take 5 mg by mouth 3 (three) times daily as needed (Muscle spasms).    clonazePAM (KLONOPIN) 0.5 MG tablet Take 1 tablet (0.5 mg total) by mouth 2 (two) times daily as needed for Anxiety.    DULoxetine (CYMBALTA) 20 MG capsule TAKE 1 CAPSULE BY MOUTH EVERY DAY AT NIGHT    fluticasone propionate (FLONASE) 50 mcg/actuation nasal spray USE 1 SPRAY (50 MCG TOTAL) IN EACH NOSTRIL ONCE DAILY    guaiFENesin (MUCINEX) 600 mg 12 hr tablet Take 1,200 mg by mouth 3 (three) times daily.    Lactobacillus acidophilus 500 million cell Cap Take 1 capsule by mouth once daily.    levETIRAcetam (KEPPRA) 250 MG Tab Take 0.5 tablets by mouth 2 (two) times a day. Once in the morning with 500mg dose Once at midday    levETIRAcetam (KEPPRA) 500 MG Tab Take by mouth 2 (two) times daily.    nitrofurantoin, macrocrystal-monohydrate, (MACROBID) 100 MG capsule Take 1 capsule (100 mg total) by mouth 2 (two) times daily. for 5 days    omeprazole  (PRILOSEC) 40 MG capsule Take 40 mg by mouth every morning.    [DISCONTINUED] ibuprofen (ADVIL,MOTRIN) 400 MG tablet Take 400 mg by mouth every 6 (six) hours as needed.     Family History       Problem Relation (Age of Onset)    Heart disease Father, Brother    Hypertension Father, Brother    Migraines Mother          Tobacco Use    Smoking status: Former     Types: Cigarettes    Smokeless tobacco: Never   Substance and Sexual Activity    Alcohol use: Never    Drug use: Never    Sexual activity: Never     Review of Systems   Unable to perform ROS: Patient nonverbal     Objective:     Vital Signs (Most Recent):  Temp: 98.5 °F (36.9 °C) (12/13/24 1715)  Pulse: 85 (12/13/24 2315)  Resp: (!) 24 (12/13/24 1715)  BP: (!) 152/95 (12/13/24 2305)  SpO2: 95 % (12/13/24 2315) Vital Signs (24h Range):  Temp:  [98.5 °F (36.9 °C)] 98.5 °F (36.9 °C)  Pulse:  [79-98] 85  Resp:  [24] 24  SpO2:  [92 %-97 %] 95 %  BP: (152-228)/() 152/95     Weight: 55 kg (121 lb 4.1 oz)  Body mass index is 22.18 kg/m².     Physical Exam  Constitutional:       General: She is not in acute distress.     Appearance: She is normal weight. She is ill-appearing. She is not toxic-appearing or diaphoretic.   HENT:      Head: Normocephalic and atraumatic.   Pulmonary:      Effort: No tachypnea or bradypnea.   Abdominal:      General: Abdomen is flat.   Genitourinary:     Comments: Pur-wick in place  Skin:     Coloration: Skin is pale and sallow.   Neurological:      Mental Status: She is lethargic and disoriented.      Comments: Right upper extremity with spastic contraction and paralyzed.  Right leg can move a little, but mild contraction with spasms at times.   Psychiatric:         Attention and Perception: She is inattentive.         Speech: She is noncommunicative.         Behavior: Behavior is uncooperative, slowed and withdrawn.         Cognition and Memory: Cognition is impaired. Memory is impaired. She exhibits impaired recent memory and  impaired remote memory.                Significant Labs: All pertinent labs within the past 24 hours have been reviewed.  Recent Results (from the past 24 hours)   CBC auto differential    Collection Time: 12/13/24  6:37 PM   Result Value Ref Range    WBC 7.43 3.90 - 12.70 K/uL    RBC 4.34 4.00 - 5.40 M/uL    Hemoglobin 12.2 12.0 - 16.0 g/dL    Hematocrit 38.0 37.0 - 48.5 %    MCV 88 82 - 98 fL    MCH 28.1 27.0 - 31.0 pg    MCHC 32.1 32.0 - 36.0 g/dL    RDW 14.9 (H) 11.5 - 14.5 %    Platelets 235 150 - 450 K/uL    MPV 9.8 9.2 - 12.9 fL    Immature Granulocytes 0.3 0.0 - 0.5 %    Gran # (ANC) 6.1 1.8 - 7.7 K/uL    Immature Grans (Abs) 0.02 0.00 - 0.04 K/uL    Lymph # 0.7 (L) 1.0 - 4.8 K/uL    Mono # 0.6 0.3 - 1.0 K/uL    Eos # 0.0 0.0 - 0.5 K/uL    Baso # 0.02 0.00 - 0.20 K/uL    nRBC 0 0 /100 WBC    Gran % 82.4 (H) 38.0 - 73.0 %    Lymph % 8.9 (L) 18.0 - 48.0 %    Mono % 8.1 4.0 - 15.0 %    Eosinophil % 0.0 0.0 - 8.0 %    Basophil % 0.3 0.0 - 1.9 %    Differential Method Automated    Comprehensive metabolic panel    Collection Time: 12/13/24  6:37 PM   Result Value Ref Range    Sodium 137 136 - 145 mmol/L    Potassium 3.9 3.5 - 5.1 mmol/L    Chloride 106 95 - 110 mmol/L    CO2 19 (L) 23 - 29 mmol/L    Glucose 67 (L) 70 - 110 mg/dL    BUN 23 8 - 23 mg/dL    Creatinine 0.8 0.5 - 1.4 mg/dL    Calcium 8.8 8.7 - 10.5 mg/dL    Total Protein 7.3 6.0 - 8.4 g/dL    Albumin 3.4 (L) 3.5 - 5.2 g/dL    Total Bilirubin 0.4 0.1 - 1.0 mg/dL    Alkaline Phosphatase 92 40 - 150 U/L    AST 91 (H) 10 - 40 U/L    ALT 23 10 - 44 U/L    eGFR >60.0 >60 mL/min/1.73 m^2    Anion Gap 12 8 - 16 mmol/L   Troponin I    Collection Time: 12/13/24  6:37 PM   Result Value Ref Range    Troponin I 0.238 (H) 0.000 - 0.026 ng/mL   Troponin I    Collection Time: 12/13/24  9:51 PM   Result Value Ref Range    Troponin I 0.233 (H) 0.000 - 0.026 ng/mL   POCT glucose    Collection Time: 12/13/24 10:06 PM   Result Value Ref Range    POCT Glucose 120 (H) 70 -  110 mg/dL         Significant Imaging: I have reviewed all pertinent imaging results/findings within the past 24 hours.      Assessment/Plan:     * Malignant essential hypertension  In the ED her VS were BP (!) 228/118 -> 225/98 -> 175/75 -> 152/95   She has no history of HTN, so suspect this may be reactive to her uncontrolled spasms and UTI   -Her daughter states this has happened in the past  Check CT head without contrast stat to be sure nothing central  Her BP has come down nicely, so will follow and treat PRN for now, as she drops fast at times as well per daughter  Stat CXR and BNP    In the ED she was treated with:  Labetalol 20 mg/4 mL (5 mg/mL) IV syring (10 mg Intravenous Given 12/13/24 2151)  Labetalol 20 mg/4 mL (5 mg/mL) IV syring (5 mg Intravenous Given 12/13/24 2002)     Acute cystitis without hematuria  Per Neurology notes, she has anaphylaxis against PCN  Treating with IV Levaquin for now with care to history of seizures  Stop Macrodantin  Follow up CXS      Altered mental status  This has all been over the past 24 hours, mostly after all of the benzo's  She was given Klonopin from the ED, which her daughter states caused severe lethargy  She also got IV Ativan in the ED (only 0.5mg), but she is unable to interact with me much due to sedation  Check CT head stat to be sure no other issue at play  Bedside swallow evaluation (may have to be NPO for a time being)  Check ABG      Elevated troponin level not due to acute coronary syndrome  I suspect this is type II ischemia due to elevated BP  So far this is flat  Continue daily ASA 325mg po - dose given in ED  Trend trop  Ordered TTE in the am      H/O: CVA (cerebrovascular accident)  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  She lives with her daughter, who notes that she suffered a stroke at age 41.  Apparently she was on hormone therapy and smoked which is suspected source of stroke. She suffered aphasia and right side  "hemiparesis following her stroke years ago.  She has been on ASA 325mg daily since that time.      Aphasia as late effect of cerebrovascular accident  Nursing bedside swallow evaluation and SLP evaluation in am  This is a chronic issue since her stroke many years ago      Muscle spasms of lower extremity  She is followed at [facility], last seeing them in clinic on 12/4/24 for Botox therapy and adjustment of seizure medications.  By history, her daughter states these worsen during times of, "infection and inflammation."  These have worsened over the past few days since UTI  Stop the Benzodiazepines due to excessive sedation  Try Baclofen 5mg TID prn for now  Check CPK      Hx of AKA (above knee amputation), right  She lives with her daughter, who notes that she suffered a stroke at age 41.  Apparently she was on hormone therapy and smoked which is suspected source of stroke. She suffered aphasia and right side hemiparesis following her stroke years ago. 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  All of this occurred around the same time as well.  She has been on ASA 325mg daily since that time.      Anxiety with depression  Continue home meds:    DULoxetine DR capsule 20 mg, 20 mg, Oral, QHS         COPD (chronic obstructive pulmonary disease)  She has been admitted to the hospital for COPD exacerbation in the past on chart review  She seems to be a bit SOB on exam, so check ABG  Duonebs Q4 prn and Q6 hours      Hypoglycemia  This improved rapidly with therapy  Gluc 67 -> 120      Seizure disorder as sequela of cerebrovascular accident  Seizure precautions, neuro checks q4    Continue home medications:    levETIRAcetam 100 mg/mL solution 650 mg, 650 mg, Oral, QHS     levetiracetam 500 mg/5 mL (5 mL) liquid Soln 130 mg, 130 mg, Oral, After lunch, 3pm    levETIRAcetam tablet 500 mg, 500 mg, Oral, am      VTE Risk Mitigation (From admission, onward)           Ordered     " enoxaparin injection 40 mg  Daily         12/14/24 0007     Place ANGELO hose  Until discontinued         12/14/24 0007     IP VTE HIGH RISK PATIENT  Once         12/14/24 0007     Place sequential compression device  Until discontinued         12/14/24 0007                         The attending portion of this evaluation, treatment, and documentation was performed per SHYAM Byrd MD via Telemedicine AudioVisual using the secure [employer] software platform with 2 way audio/video. The provider was located off-site and the patient is located in the hospital. The aforementioned video software was utilized to document the relevant history and physical exam        SHYAM Byrd MD  Department of Hospital Medicine   [facility] - Emergency Dept

## 2024-12-14 NOTE — SUBJECTIVE & OBJECTIVE
Past Medical History:   Diagnosis Date    Anxiety disorder, unspecified     Aphasia as late effect of cerebrovascular accident     COPD (chronic obstructive pulmonary disease)     GERD (gastroesophageal reflux disease)     Hyponatremia     Her sodium was low and Tegretol and HCTZ was discontinued. Her sodium has returned to normal.    Muscle spasm of right lower extremity     Spasticity: They feel Botox was more effective than Xeomin. This has been helpful with comfort and hygiene. Additional units for torticollis and in the pec helped with range of motion in neck and pain when raising left arm    Neuropathy     Pancreatic cyst     Seizure disorder     Spastic monoplegia of upper extremity     On right since the stroke    Stroke     old left MCA territory stroke with encephalomalacia    Unspecified glaucoma        Past Surgical History:   Procedure Laterality Date    HYSTERECTOMY      Right Above knee amputation         Review of patient's allergies indicates:   Allergen Reactions    Penicillins Other (See Comments)     Unknown       Current Facility-Administered Medications on File Prior to Encounter   Medication    [COMPLETED] QUEtiapine tablet 25 mg     Current Outpatient Medications on File Prior to Encounter   Medication Sig    albuterol (PROVENTIL) 2.5 mg /3 mL (0.083 %) nebulizer solution Take 3 mLs (2.5 mg total) by nebulization every 6 (six) hours as needed for Wheezing. Rescue (Patient taking differently: Take 2.5 mg by nebulization every 6 (six) hours as needed for Wheezing or Shortness of Breath. Rescue)    aspirin (ECOTRIN) 325 MG EC tablet Take 325 mg by mouth once daily.    baclofen (LIORESAL) 5 mg Tab tablet Take 5 mg by mouth 3 (three) times daily as needed (Muscle spasms).    clonazePAM (KLONOPIN) 0.5 MG tablet Take 1 tablet (0.5 mg total) by mouth 2 (two) times daily as needed for Anxiety.    DULoxetine (CYMBALTA) 20 MG capsule TAKE 1 CAPSULE BY MOUTH EVERY DAY AT NIGHT    fluticasone propionate  (FLONASE) 50 mcg/actuation nasal spray USE 1 SPRAY (50 MCG TOTAL) IN EACH NOSTRIL ONCE DAILY    guaiFENesin (MUCINEX) 600 mg 12 hr tablet Take 1,200 mg by mouth 3 (three) times daily.    Lactobacillus acidophilus 500 million cell Cap Take 1 capsule by mouth once daily.    levETIRAcetam (KEPPRA) 250 MG Tab Take 0.5 tablets by mouth 2 (two) times a day. Once in the morning with 500mg dose Once at midday    levETIRAcetam (KEPPRA) 500 MG Tab Take by mouth 2 (two) times daily.    nitrofurantoin, macrocrystal-monohydrate, (MACROBID) 100 MG capsule Take 1 capsule (100 mg total) by mouth 2 (two) times daily. for 5 days    omeprazole (PRILOSEC) 40 MG capsule Take 40 mg by mouth every morning.    [DISCONTINUED] ibuprofen (ADVIL,MOTRIN) 400 MG tablet Take 400 mg by mouth every 6 (six) hours as needed.     Family History       Problem Relation (Age of Onset)    Heart disease Father, Brother    Hypertension Father, Brother    Migraines Mother          Tobacco Use    Smoking status: Former     Types: Cigarettes    Smokeless tobacco: Never   Substance and Sexual Activity    Alcohol use: Never    Drug use: Never    Sexual activity: Never     Review of Systems   Unable to perform ROS: Patient nonverbal     Objective:     Vital Signs (Most Recent):  Temp: 98.5 °F (36.9 °C) (12/13/24 1715)  Pulse: 85 (12/13/24 2315)  Resp: (!) 24 (12/13/24 1715)  BP: (!) 152/95 (12/13/24 2305)  SpO2: 95 % (12/13/24 2315) Vital Signs (24h Range):  Temp:  [98.5 °F (36.9 °C)] 98.5 °F (36.9 °C)  Pulse:  [79-98] 85  Resp:  [24] 24  SpO2:  [92 %-97 %] 95 %  BP: (152-228)/() 152/95     Weight: 55 kg (121 lb 4.1 oz)  Body mass index is 22.18 kg/m².     Physical Exam  Constitutional:       General: She is not in acute distress.     Appearance: She is normal weight. She is ill-appearing. She is not toxic-appearing or diaphoretic.   HENT:      Head: Normocephalic and atraumatic.   Pulmonary:      Effort: No tachypnea or bradypnea.   Abdominal:       General: Abdomen is flat.   Genitourinary:     Comments: Pur-wick in place  Skin:     Coloration: Skin is pale and sallow.   Neurological:      Mental Status: She is lethargic and disoriented.      Comments: Right upper extremity with spastic contraction and paralyzed.  Right leg can move a little, but mild contraction with spasms at times.   Psychiatric:         Attention and Perception: She is inattentive.         Speech: She is noncommunicative.         Behavior: Behavior is uncooperative, slowed and withdrawn.         Cognition and Memory: Cognition is impaired. Memory is impaired. She exhibits impaired recent memory and impaired remote memory.                Significant Labs: All pertinent labs within the past 24 hours have been reviewed.  Recent Results (from the past 24 hours)   CBC auto differential    Collection Time: 12/13/24  6:37 PM   Result Value Ref Range    WBC 7.43 3.90 - 12.70 K/uL    RBC 4.34 4.00 - 5.40 M/uL    Hemoglobin 12.2 12.0 - 16.0 g/dL    Hematocrit 38.0 37.0 - 48.5 %    MCV 88 82 - 98 fL    MCH 28.1 27.0 - 31.0 pg    MCHC 32.1 32.0 - 36.0 g/dL    RDW 14.9 (H) 11.5 - 14.5 %    Platelets 235 150 - 450 K/uL    MPV 9.8 9.2 - 12.9 fL    Immature Granulocytes 0.3 0.0 - 0.5 %    Gran # (ANC) 6.1 1.8 - 7.7 K/uL    Immature Grans (Abs) 0.02 0.00 - 0.04 K/uL    Lymph # 0.7 (L) 1.0 - 4.8 K/uL    Mono # 0.6 0.3 - 1.0 K/uL    Eos # 0.0 0.0 - 0.5 K/uL    Baso # 0.02 0.00 - 0.20 K/uL    nRBC 0 0 /100 WBC    Gran % 82.4 (H) 38.0 - 73.0 %    Lymph % 8.9 (L) 18.0 - 48.0 %    Mono % 8.1 4.0 - 15.0 %    Eosinophil % 0.0 0.0 - 8.0 %    Basophil % 0.3 0.0 - 1.9 %    Differential Method Automated    Comprehensive metabolic panel    Collection Time: 12/13/24  6:37 PM   Result Value Ref Range    Sodium 137 136 - 145 mmol/L    Potassium 3.9 3.5 - 5.1 mmol/L    Chloride 106 95 - 110 mmol/L    CO2 19 (L) 23 - 29 mmol/L    Glucose 67 (L) 70 - 110 mg/dL    BUN 23 8 - 23 mg/dL    Creatinine 0.8 0.5 - 1.4 mg/dL     Calcium 8.8 8.7 - 10.5 mg/dL    Total Protein 7.3 6.0 - 8.4 g/dL    Albumin 3.4 (L) 3.5 - 5.2 g/dL    Total Bilirubin 0.4 0.1 - 1.0 mg/dL    Alkaline Phosphatase 92 40 - 150 U/L    AST 91 (H) 10 - 40 U/L    ALT 23 10 - 44 U/L    eGFR >60.0 >60 mL/min/1.73 m^2    Anion Gap 12 8 - 16 mmol/L   Troponin I    Collection Time: 12/13/24  6:37 PM   Result Value Ref Range    Troponin I 0.238 (H) 0.000 - 0.026 ng/mL   Troponin I    Collection Time: 12/13/24  9:51 PM   Result Value Ref Range    Troponin I 0.233 (H) 0.000 - 0.026 ng/mL   POCT glucose    Collection Time: 12/13/24 10:06 PM   Result Value Ref Range    POCT Glucose 120 (H) 70 - 110 mg/dL         Significant Imaging: I have reviewed all pertinent imaging results/findings within the past 24 hours.

## 2024-12-14 NOTE — PROGRESS NOTES
Pharmacist Renal Dose Adjustment Note    Meaghan Pan is a 79 y.o. female being treated with the medication levofloxacin    Patient Data:    Vital Signs (Most Recent):  Temp: 98.5 °F (36.9 °C) (12/13/24 1715)  Pulse: 85 (12/13/24 2315)  Resp: (!) 24 (12/13/24 1715)  BP: (!) 152/95 (12/13/24 2305)  SpO2: 95 % (12/13/24 2315) Vital Signs (72h Range):  Temp:  [98.2 °F (36.8 °C)-98.5 °F (36.9 °C)]   Pulse:  []   Resp:  [16-24]   BP: (103-228)/()   SpO2:  [89 %-97 %]      Recent Labs   Lab 12/13/24 1837   CREATININE 0.8     Serum creatinine: 0.8 mg/dL 12/13/24 1837  Estimated creatinine clearance: 46.6 mL/min    Medication:levofloxacin dose: 500mg frequency q24h will be changed to medication:levofloxacin dose:750mg frequency:q48h    Pharmacist's Name: Ml Miller  Pharmacist's Extension:

## 2024-12-14 NOTE — CARE UPDATE
Source of new fever is likely Influenza A as below with acute encephalopathy  She also has UTI, so continue the Levaquin for that and secondary lung infection  She is unable to swallow the Tamiflu right now, so trying to find an alternative inhaled that might be available.   -If not, may have to place NGT for meds (not ideal, so trying to avoid)  CT head and CXR with no acute findings as below  Non po options for flu treatment there is zanamivir(inh) and peramivir(IV) both of which are not in pyxis  Placing NGT: Dose for tamiflu is 75mg once, 30mg bid thereafter      Latest Reference Range & Units 12/14/24 00:49   Flu A & B Source  Nasal Swab   Influenza A, Molecular Negative  Positive !   Influenza B, Molecular Negative  Negative   SARS-CoV-2 RNA, Amplification, Qual Negative  Negative       Latest Reference Range & Units 12/14/24 00:36   POC PH 7.35 - 7.45  7.397   POC PCO2 35 - 45 mmHg 31.9 (L)   POC PO2 80 - 100 mmHg 63 (L)   POC HCO3 24 - 28 mmol/L 19.6 (L)   POC SATURATED O2 95 - 100 % 92   Sample  ARTERIAL   POC TCO2 23 - 27 mmol/L 21 (L)   POC BE -2 to 2 mmol/L -5 (L)   FiO2  21   DelSys  Room Air   Site  LR   Mode  SPONT      Latest Reference Range & Units 12/13/24 18:37 12/13/24 21:51 12/13/24 23:37   BNP 0 - 99 pg/mL   256 (H)   Troponin I 0.000 - 0.026 ng/mL 0.238 (H) 0.233 (H)      CT HEAD WITHOUT CONTRAST  FINDINGS:  Encephalomalacia from large left MCA remote infarct with associated ex vacuo dilatation of the left lateral ventricle, similar compared to prior.  There is no evidence of acute infarct, hemorrhage, or mass.  There is ventricular and sulcal enlargement consistent with generalized atrophy.  Moderate confluent decreased supratentorial white matter attenuation most likely related to chronic nonspecific small vessel disease.   No mass-effect or midline shift.  There are no abnormal extra-axial fluid collections.  The paranasal sinuses and mastoid air cells are essentially clear .  The calvarium  appears intact.  .     Impression:   No acute intracranial abnormalities.   Chronic findings, as above, similar compared to prior.      Electronically signed by:Stephen Tariq MD  Date:                                            12/14/2024  Time:                                           00:50    XR CHEST AP PORTABLE  FINDINGS:  Patient rotated to the left, similar to prior.   Chronic increased interstitial markings in the lungs, similar to prior.   Heart and lungs  appear unchanged when allowing for differences in technique and positioning.     Impression:   Chronic changes as above.  No acute findings.   No significant change from prior study.      Electronically signed by:Stephen Tariq MD  Date:                                            12/14/2024  Time:                                           00:41

## 2024-12-14 NOTE — ASSESSMENT & PLAN NOTE
Seizure precautions, neuro checks q4    Continue home medications:    levETIRAcetam 100 mg/mL solution 650 mg, 650 mg, Oral, QHS     levetiracetam 500 mg/5 mL (5 mL) liquid Soln 130 mg, 130 mg, Oral, After lunch, 3pm    levETIRAcetam tablet 500 mg, 500 mg, Oral, am

## 2024-12-14 NOTE — ASSESSMENT & PLAN NOTE
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  She lives with her daughter, who notes that she suffered a stroke at age 41.  Apparently she was on hormone therapy and smoked which is suspected source of stroke. She suffered aphasia and right side hemiparesis following her stroke years ago.  She has been on ASA 325mg daily since that time.

## 2024-12-14 NOTE — ASSESSMENT & PLAN NOTE
"She is followed at [facility], last seeing them in clinic on 12/4/24 for Botox therapy and adjustment of seizure medications.  By history, her daughter states these worsen during times of, "infection and inflammation."  These have worsened over the past few days since UTI  Stop the Benzodiazepines due to excessive sedation  Try Baclofen 5mg TID prn for now  Check CPK    "

## 2024-12-14 NOTE — NURSING
Received to room via stretcher, transferred to bed x3 persons.  Daughter at bedside.  O2 at 2L per N/C resp regular.  Telemetry in place.  Purewick in place.  22 G R hand with Levaquin.  22G L AC SL intact.  Hx of aphasia.  R arm contraction noted.  Small muscle spasms in right AKA with movement noted.  Side rails x2 padded and suction set up.  Bed in low locked position with side rails up x3 and call light in reach, bed alarm in use.  Did not wake with skin check or assessment.

## 2024-12-14 NOTE — ASSESSMENT & PLAN NOTE
This has all been over the past 24 hours, mostly after all of the benzo's  She was given Klonopin from the ED, which her daughter states caused severe lethargy  She also got IV Ativan in the ED (only 0.5mg), but she is unable to interact with me much due to sedation  Check CT head stat to be sure no other issue at play  Bedside swallow evaluation (may have to be NPO for a time being)  Check ABG

## 2024-12-14 NOTE — ED NOTES
Noted glucose of 67 mg/dL. MD approved PO orange juice. Pt with some redirecting was able to consume 4 oz of orange juice. Pt was able to voice yes when asked if she can drink the orange juice and stated no when she no longer wished to drink the orange juice. Family states pt can express yes and no but can sometimes answer them incorrectly. Pt responses appeared appropriate for this encounter. MD informed of interaction.

## 2024-12-14 NOTE — CARE UPDATE
Patient was admitted earlier this morning by the nocturnist    79-year-old female with a history of CVA with left MCA territory aphasia right-sided spasticity. , COPD pancreatic cyst right AKA who lives with the daughter at home, history of seizures and was seen in the emergency room.  With lethargy flushed cheeks decreased level of appetite elevated blood pressure 190/105 blood pressure is under much better control currently she was diagnosed with the acute cystitis influenza WBCs at 6.31, hemoglobin 11.1 platelets of 205 sodium 133 potassium 3.5 CO2 of 19 BUN 20 creatinine 0.8 CPK is elevated at 19 17 mild elevation of troponins influenza a is positive and ABGs showed a pH of 7.39 pCO2 of 31 pCO2 of 63 met with the daughter discussed case patient is clinically doing much better today getting back to her baseline patient is being treated with Tamiflu as well as Levaquin blood pressure down to 153/63 temperature of 98.3° pulse of 78 speech pathology saw the patient.  She has been cleared from eating and we will have advancing diet.  We will continue on current treatment plan plan is disposition back with daughter once stable

## 2024-12-14 NOTE — ASSESSMENT & PLAN NOTE
She lives with her daughter, who notes that she suffered a stroke at age 41.  Apparently she was on hormone therapy and smoked which is suspected source of stroke. She suffered aphasia and right side hemiparesis following her stroke years ago. 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  All of this occurred around the same time as well.  She has been on ASA 325mg daily since that time.

## 2024-12-14 NOTE — PT/OT/SLP PROGRESS
Physical Therapy      Patient Name:  Meaghan Pan   MRN:  [id number]  [id number]    Patient not seen today secondary to  extreme fatigue, spoke with daughter who states to come back tomorrow or Monday due to to mom being exhausted . Will follow-up Monday.

## 2024-12-14 NOTE — ASSESSMENT & PLAN NOTE
Per Neurology notes, she has anaphylaxis against PCN  Treating with IV Levaquin for now with care to history of seizures  Stop Macrodantin  Follow up CXS

## 2024-12-14 NOTE — ASSESSMENT & PLAN NOTE
She has been admitted to the hospital for COPD exacerbation in the past on chart review  She seems to be a bit SOB on exam, so check ABG  Duonebs Q4 prn and Q6 hours

## 2024-12-14 NOTE — PLAN OF CARE
Problem: Adult Inpatient Plan of Care  Goal: Plan of Care Review  12/14/2024 0355 by Leslie Amaya RN  Outcome: Progressing  12/14/2024 0354 by Leslie Amaya RN  Outcome: Progressing  Goal: Patient-Specific Goal (Individualized)  12/14/2024 0355 by Leslie Amaya RN  Outcome: Progressing  12/14/2024 0354 by Leslie Amaya RN  Outcome: Progressing  Goal: Absence of Hospital-Acquired Illness or Injury  12/14/2024 0355 by Leslie Amaya RN  Outcome: Progressing  12/14/2024 0354 by Leslie Amaya RN  Outcome: Progressing  Goal: Optimal Comfort and Wellbeing  12/14/2024 0355 by Leslie Amaya RN  Outcome: Progressing  12/14/2024 0354 by Leslie Amaya RN  Outcome: Progressing  Goal: Readiness for Transition of Care  12/14/2024 0355 by Leslie Amaya RN  Outcome: Progressing  12/14/2024 0354 by Leslie Amaya RN  Outcome: Progressing     Problem: Infection  Goal: Absence of Infection Signs and Symptoms  12/14/2024 0355 by Leslie Amaya RN  Outcome: Progressing  12/14/2024 0354 by Leslie Amaya RN  Outcome: Progressing     Problem: Skin Injury Risk Increased  Goal: Skin Health and Integrity  12/14/2024 0355 by Leslie Amaya RN  Outcome: Progressing  12/14/2024 0354 by Leslie Amaya RN  Outcome: Progressing

## 2024-12-14 NOTE — ASSESSMENT & PLAN NOTE
Nursing bedside swallow evaluation and SLP evaluation in am  This is a chronic issue since her stroke many years ago

## 2024-12-14 NOTE — ED PROVIDER NOTES
Encounter Date: 12/13/2024       History     Chief Complaint   Patient presents with    Hypertension     Patient seen here twice in the last 24 hours for spasms to right leg/above the knee amputation.  Patient noted to have hypertension at home (190/105).  Patient appears to have routine spasm of set leg.  Patient holding her breath at times per family.       79-year-old female with significant history of seizures, anxiety, depression, stroke at age 41 and has been aphasic since then, dysphagia, right BKA, chronic spasms of right stump.   She presents to ED accompanied by family who reports chief complaints of elevated blood pressure readings at home systolic greater than 200's (her blood pressure is usually 120s to 130 systolic according to the daughter) and increased spasms of the right stump. Family add that last night she was increasingly agitated, and was unable to rest. Apparently has not eaten much today.  Currently being treated for UTI with nitrofurantoin day 2. The daughter tells me that the patient usually has increased right lower ext stump spasms with any infection.           The history is provided by a relative. No  was used.     Review of patient's allergies indicates:   Allergen Reactions    Penicillins Anaphylaxis     Unknown     Past Medical History:   Diagnosis Date    Anxiety disorder, unspecified     Aphasia as late effect of cerebrovascular accident     COPD (chronic obstructive pulmonary disease)     GERD (gastroesophageal reflux disease)     Hyponatremia     Her sodium was low and Tegretol and HCTZ was discontinued. Her sodium has returned to normal.    Muscle spasm of right lower extremity     Spasticity: They feel Botox was more effective than Xeomin. This has been helpful with comfort and hygiene. Additional units for torticollis and in the pec helped with range of motion in neck and pain when raising left arm    Neuropathy     Pancreatic cyst     Seizure disorder      Spastic monoplegia of upper extremity     On right since the stroke    Stroke     old left MCA territory stroke with encephalomalacia    Unspecified glaucoma      Past Surgical History:   Procedure Laterality Date    HYSTERECTOMY      Right Above knee amputation       Family History   Problem Relation Name Age of Onset    Migraines Mother      Heart disease Father      Hypertension Father      Heart disease Brother      Hypertension Brother       Social History     Tobacco Use    Smoking status: Former     Types: Cigarettes    Smokeless tobacco: Never   Substance Use Topics    Alcohol use: Never    Drug use: Never     Review of Systems   Constitutional: Negative.    HENT: Negative.     Eyes: Negative.    Respiratory: Negative.     Cardiovascular: Negative.    Gastrointestinal: Negative.    Endocrine: Negative.    Genitourinary: Negative.    Musculoskeletal: Negative.    Skin: Negative.    Neurological:  Positive for tremors.   Hematological: Negative.    Psychiatric/Behavioral: Negative.     All other systems reviewed and are negative.      Physical Exam     Initial Vitals [12/13/24 1715]   BP Pulse Resp Temp SpO2   (!) 228/118 98 (!) 24 98.5 °F (36.9 °C) (!) 93 %      MAP       --         Physical Exam    Nursing note and vitals reviewed.  Constitutional: She appears well-developed.   HENT:   Head: Normocephalic.   Eyes: Pupils are equal, round, and reactive to light.   Neck: No JVD present.   Normal range of motion.  Pulmonary/Chest: Breath sounds normal. No respiratory distress. She has no wheezes. She has no rales.   Abdominal: Abdomen is soft. Bowel sounds are normal.   Musculoskeletal:         General: Normal range of motion.      Cervical back: Normal range of motion.      Comments: Occasional spasms of stump right BKA     Neurological: She has normal strength. GCS score is 15. GCS eye subscore is 4. GCS verbal subscore is 5. GCS motor subscore is 6.   Aphasic at baseline   Skin: Skin is warm. Capillary  refill takes less than 2 seconds.         ED Course   Critical Care    Date/Time: 12/13/2024 5:47 PM    Performed by: Ramesh Murphy MD  Authorized by: Ramesh Murphy MD  Direct patient critical care time: 15 minutes  Additional history critical care time: 5 minutes  Ordering / reviewing critical care time: 5 minutes  Documentation critical care time: 5 minutes  Consulting other physicians critical care time: 5 minutes  Total critical care time (exclusive of procedural time) : 35 minutes  Critical care was necessary to treat or prevent imminent or life-threatening deterioration of the following conditions: Hypertensive emergency.  Critical care was time spent personally by me on the following activities: blood draw for specimens, discussions with consultants, interpretation of cardiac output measurements, evaluation of patient's response to treatment, examination of patient, obtaining history from patient or surrogate, ordering and performing treatments and interventions, ordering and review of laboratory studies, ordering and review of radiographic studies, pulse oximetry, re-evaluation of patient's condition and review of old charts.        Labs Reviewed   INFLUENZA A & B BY MOLECULAR - Abnormal       Result Value    Influenza A, Molecular Positive (*)     Influenza B, Molecular Negative      Flu A & B Source Nasal Swab     CBC W/ AUTO DIFFERENTIAL - Abnormal    WBC 7.43      RBC 4.34      Hemoglobin 12.2      Hematocrit 38.0      MCV 88      MCH 28.1      MCHC 32.1      RDW 14.9 (*)     Platelets 235      MPV 9.8      Immature Granulocytes 0.3      Gran # (ANC) 6.1      Immature Grans (Abs) 0.02      Lymph # 0.7 (*)     Mono # 0.6      Eos # 0.0      Baso # 0.02      nRBC 0      Gran % 82.4 (*)     Lymph % 8.9 (*)     Mono % 8.1      Eosinophil % 0.0      Basophil % 0.3      Differential Method Automated     COMPREHENSIVE METABOLIC PANEL - Abnormal    Sodium 137      Potassium 3.9      Chloride 106       CO2 19 (*)     Glucose 67 (*)     BUN 23      Creatinine 0.8      Calcium 8.8      Total Protein 7.3      Albumin 3.4 (*)     Total Bilirubin 0.4      Alkaline Phosphatase 92      AST 91 (*)     ALT 23      eGFR >60.0      Anion Gap 12     TROPONIN I - Abnormal    Troponin I 0.238 (*)    TROPONIN I - Abnormal    Troponin I 0.233 (*)    B-TYPE NATRIURETIC PEPTIDE - Abnormal     (*)    POCT GLUCOSE - Abnormal    POCT Glucose 120 (*)    ISTAT PROCEDURE - Abnormal    POC PH 7.397      POC PCO2 31.9 (*)     POC PO2 63 (*)     POC HCO3 19.6 (*)     POC BE -5 (*)     POC SATURATED O2 92      POC TCO2 21 (*)     Sample ARTERIAL      Site LR      Allens Test Pass      DelSys Room Air      Mode SPONT      FiO2 21      Sp02 95     LACTIC ACID, PLASMA    Lactate (Lactic Acid) 1.3     SARS-COV-2 RNA AMPLIFICATION, QUAL    SARS-CoV-2 RNA, Amplification, Qual Negative          ECG Results              EKG 12-lead (Final result)        Collection Time Result Time QRS Duration OHS QTC Calculation    12/13/24 18:33:54 12/16/24 11:52:18 86 479                     Final result by Interface, Lab In [facility] (12/16/24 11:52:26)                   Narrative:    Test Reason : I10,    Vent. Rate :  90 BPM     Atrial Rate :  90 BPM     P-R Int : 160 ms          QRS Dur :  86 ms      QT Int : 392 ms       P-R-T Axes :  76  73 128 degrees    QTcB Int : 479 ms    Normal sinus rhythm  Possible Left atrial enlargement  Nonspecific ST and T wave abnormality  Abnormal ECG  When compared with ECG of 02-May-2024 19:31,  Inverted T waves have replaced nonspecific T wave abnormality in Lateral  leads  Confirmed by Juan Jose Hernandez ([id number]) on 12/16/2024 11:52:17 AM    Referred By: AAAREFERRAL SELF           Confirmed By: Juan Jose Hernandez                                  Imaging Results              CT Head Without Contrast (Final result)  Result time 12/14/24 00:50:32      Final result by Stephen Tariq MD (12/14/24 00:50:32)                   Impression:       No acute intracranial abnormalities.    Chronic findings, as above, similar compared to prior.      Electronically signed by: Stephen Tariq MD  Date:    12/14/2024  Time:    00:50               Narrative:    EXAMINATION:  CT HEAD WITHOUT CONTRAST    CLINICAL HISTORY:  Mental status change, unknown cause;    TECHNIQUE:  Low dose axial images were obtained through the head.  Coronal and sagittal reformations were also performed. Contrast was not administered.    COMPARISON:  08/26/2023.    FINDINGS:  Encephalomalacia from large left MCA remote infarct with associated ex vacuo dilatation of the left lateral ventricle, similar compared to prior.  There is no evidence of acute infarct, hemorrhage, or mass.  There is ventricular and sulcal enlargement consistent with generalized atrophy.  Moderate confluent decreased supratentorial white matter attenuation most likely related to chronic nonspecific small vessel disease.   No mass-effect or midline shift.  There are no abnormal extra-axial fluid collections.  The paranasal sinuses and mastoid air cells are essentially clear .  The calvarium appears intact.  .                                       X-Ray Chest AP Portable (Final result)  Result time 12/14/24 00:41:07      Final result by Stephen Tariq MD (12/14/24 00:41:07)                   Impression:      Chronic changes as above.  No acute findings.    No significant change from prior study.      Electronically signed by: Stephen Tariq MD  Date:    12/14/2024  Time:    00:41               Narrative:    EXAMINATION:  XR CHEST AP PORTABLE    CLINICAL HISTORY:  hypertensive emergency;    TECHNIQUE:  Single frontal view of the chest was performed.    COMPARISON:  05/02/2024.    FINDINGS:  Patient rotated to the left, similar to prior.    Chronic increased interstitial markings in the lungs, similar to prior.    Heart and lungs  appear unchanged when allowing for differences in technique and positioning.                                        Medications   labetalol 20 mg/4 mL (5 mg/mL) IV syring (5 mg Intravenous Given 12/13/24 2002)   dextrose 5 % and 0.45 % NaCl infusion (0 mLs Intravenous Stopped 12/13/24 2216)   labetalol 20 mg/4 mL (5 mg/mL) IV syring (10 mg Intravenous Given 12/13/24 2151)   LORazepam (ATIVAN) injection 0.5 mg (0.5 mg Intravenous Given 12/13/24 2208)   aspirin tablet 325 mg (325 mg Oral Given 12/13/24 2305)   potassium, sodium phosphates 280-160-250 mg packet 2 packet (2 packets Oral Given 12/16/24 0841)   oseltamivir capsule 75 mg (75 mg Oral Given 12/16/24 0956)   tuberculin injection 5 Units (5 Units Intradermal Given 12/17/24 1027)     Medical Decision Making  Patient is aphasic at baseline. History provided by daughter. See H&P above for details  Right stump spasms for chronic, her blood pressure elevation is however new.  Blood pressure 228 systolic upon arrival.  She is at baseline mentation.  Basic labs obtained to assess for end-organ damage/injury secondary to elevated blood pressure readings.  EKG shows normal sinus rhythm, nonspecific T-wave inversions in lead aVL, and V2, no STEMI  Initial troponin 0.238 repeat 0.233.  mg given.  Serum glucose 67, apparently she has not been eating much today.  She was only able to drink only 4 oz of orange juice. Was given 250 cc of D5 half NS.   Blood pressure improved with repeat labetalol pushes.  Admit to medicine for further evaluation management of hypertensive urgency/emergency      Amount and/or Complexity of Data Reviewed  Labs: ordered.    Risk  OTC drugs.  Prescription drug management.  Decision regarding hospitalization.                                      Clinical Impression:  Final diagnoses:  [I10] Hypertension  [I16.0] Hypertensive urgency  [R79.89] Troponin level elevated          ED Disposition Condition    Admit                 Ramesh Murphy MD  12/13/24 0626       Ramesh Murphy MD  12/25/24 5467

## 2024-12-14 NOTE — AI DETERIORATION ALERT
Artificial Intelligence Notification  [address] [place] [address]  Phone: [phone]    This documentation was triggered by an Artificial Intelligence Notification:    Admit Date: 2024   LOS: 0  Code Status: Full Code  : 1945  Age: 79 y.o.  Weight:   Wt Readings from Last 1 Encounters:   24 54.5 kg (120 lb 2.4 oz)        Sex: female  Bed:   MRN: [id number]  Attending Physician: Sami Etienne DO     Date of Alert: 2024  Time AI Alert Received: 1403            Vitals:    24 1200   BP:    Pulse: 81   Resp: 46   Temp:      SpO2: 98 %      Artificial Intelligence alert discussed with Provider:     Name: Dr. Etienne    Date/Time of Provider Notification: 1403      Patient Condition: primary RN at bedside changing patient at time of alert. Pt respirations increased with care, returned to baseline once finished. Daughter at bedside.   Plan of care ongoing.

## 2024-12-14 NOTE — PLAN OF CARE
Problem: Gas Exchange Impaired  Goal: Optimal Gas Exchange  Outcome: Progressing  Intervention: Optimize Oxygenation and Ventilation  Flowsheets (Taken 12/14/2024 1510)  Airway/Ventilation Management: airway patency maintained  Head of Bed (HOB) Positioning: HOB elevated   Patient in no apparent distress. Patient on room air. Some tachypnea noted with BBS course crackles and diminished. Aerosol treatments given Q 8 . Will continue to monitor.

## 2024-12-14 NOTE — CARE UPDATE
"Patient did not pass her bedside swallow exam by the nurse  She is a bit more confused now as well with new fever that just occurred    BP (!) 152/95   Pulse 89   Temp (!) 101.5 °F (38.6 °C) (Axillary) Comment: MD notified  Resp (!) 24   Ht 5' 2" (1.575 m)   Wt 55 kg (121 lb 4.1 oz)   SpO2 (!) 94%   BMI 22.18 kg/m²       Add Vancomycin to regime  If worsens, consider changing Levaquin to Aztreonam (anaphylaxis to PCN)  Check blood cxs  CT head and CXR pending  Making NPO until MS improves  Changed Keppra to 500mg iv q12 while NPO  "

## 2024-12-14 NOTE — HPI
"Ms. 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    She lives with her daughter, who notes that she suffered a stroke at age 41.  Apparently she was on hormone therapy and smoked which is suspected source of stroke. She suffered aphasia and right side hemiparesis following her stroke years ago. 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  She is followed at [facility], last seeing them in clinic on 12/4/24 for Botox therapy and adjustment of seizure medications.  At that visit her Keppra was increased to 500 mg qam, 125mg at 3pm, and 625 mg nightly.    She was seen in the ED x 2 on 12/12/24, the first time for UTI and muscle spasms treated with Klonopin and Macrobid.  She later returned on the same day with severe worsening of her muscle spasms, treated with Seroquel 25mg and Valium 5mg.  Her daughter has been giving her the Klonopin from the ED, but had to stop due to profound fatigue and lethargy.  She has seen mild improvement in her spasms today, but the real reason she brought her back was due to elevated BP at home.     Ms. Pan is a bit lethargic and unable to speak on exam.  Her daughter found her cheeks flushed, which is common when her BP shoots up (has had this happen in the past).  She checked it at home and found it to be 190/105, which frightened her and made her come to the ED.  She has seen no N/V/D, fever, chills, but does not hot flashes.      In the ED her VS were BP (!) 228/118 -> 225/98 -> 175/75 -> 152/95   Pulse 98 -> 85   Temp 98.5 °F (36.9 °C) (Axillary)   Resp (!) 24   Ht 5' 2" (1.575 m)   Wt 55 kg (121 lb 4.1 oz)   SpO2 95% RA  BMI 22.18 kg/m².  Labs showed NML CBC, Cr 0.8, Gluc 67 -> 120, Alb 3.4, Trop 0.233, COVID/FLU NEG, UA: nitrite +, LE 1+, WBC 15, " many bacteria.    No radiographs done in the ED.  EKG showed NSR rate 90, NS ST-T changes, QTc 479 ms.    In the ED she was treated with:  Medications   labetalol 20 mg/4 mL (5 mg/mL) IV syring (5 mg Intravenous Given 12/13/24 2002)   dextrose 5 % and 0.45 % NaCl infusion (0 mLs Intravenous Stopped 12/13/24 2216)   labetalol 20 mg/4 mL (5 mg/mL) IV syring (10 mg Intravenous Given 12/13/24 2151)   LORazepam (ATIVAN) injection 0.5 mg (0.5 mg Intravenous Given 12/13/24 2208)   aspirin tablet 325 mg (325 mg Oral Given 12/13/24 2305)

## 2024-12-15 LAB
ALBUMIN SERPL BCP-MCNC: 2.7 G/DL (ref 3.5–5.2)
ALP SERPL-CCNC: 68 U/L (ref 40–150)
ALT SERPL W/O P-5'-P-CCNC: 31 U/L (ref 10–44)
ANION GAP SERPL CALC-SCNC: 10 MMOL/L (ref 8–16)
AST SERPL-CCNC: 77 U/L (ref 10–40)
BASOPHILS # BLD AUTO: 0.02 K/UL (ref 0–0.2)
BASOPHILS NFR BLD: 0.4 % (ref 0–1.9)
BILIRUB SERPL-MCNC: 0.2 MG/DL (ref 0.1–1)
BUN SERPL-MCNC: 16 MG/DL (ref 8–23)
CALCIUM SERPL-MCNC: 8 MG/DL (ref 8.7–10.5)
CHLORIDE SERPL-SCNC: 108 MMOL/L (ref 95–110)
CO2 SERPL-SCNC: 18 MMOL/L (ref 23–29)
CREAT SERPL-MCNC: 0.8 MG/DL (ref 0.5–1.4)
DIFFERENTIAL METHOD BLD: ABNORMAL
EOSINOPHIL # BLD AUTO: 0 K/UL (ref 0–0.5)
EOSINOPHIL NFR BLD: 0.7 % (ref 0–8)
ERYTHROCYTE [DISTWIDTH] IN BLOOD BY AUTOMATED COUNT: 15.7 % (ref 11.5–14.5)
EST. GFR  (NO RACE VARIABLE): >60 ML/MIN/1.73 M^2
GLUCOSE SERPL-MCNC: 117 MG/DL (ref 70–110)
HCT VFR BLD AUTO: 38.5 % (ref 37–48.5)
HGB BLD-MCNC: 12.3 G/DL (ref 12–16)
IMM GRANULOCYTES # BLD AUTO: 0.01 K/UL (ref 0–0.04)
IMM GRANULOCYTES NFR BLD AUTO: 0.2 % (ref 0–0.5)
LYMPHOCYTES # BLD AUTO: 0.8 K/UL (ref 1–4.8)
LYMPHOCYTES NFR BLD: 18.4 % (ref 18–48)
MAGNESIUM SERPL-MCNC: 2.2 MG/DL (ref 1.6–2.6)
MCH RBC QN AUTO: 28.5 PG (ref 27–31)
MCHC RBC AUTO-ENTMCNC: 31.9 G/DL (ref 32–36)
MCV RBC AUTO: 89 FL (ref 82–98)
MONOCYTES # BLD AUTO: 0.4 K/UL (ref 0.3–1)
MONOCYTES NFR BLD: 9.9 % (ref 4–15)
NEUTROPHILS # BLD AUTO: 3.1 K/UL (ref 1.8–7.7)
NEUTROPHILS NFR BLD: 70.4 % (ref 38–73)
NRBC BLD-RTO: 0 /100 WBC
PHOSPHATE SERPL-MCNC: 2.8 MG/DL (ref 2.7–4.5)
PLATELET # BLD AUTO: 235 K/UL (ref 150–450)
PMV BLD AUTO: 10.5 FL (ref 9.2–12.9)
POTASSIUM SERPL-SCNC: 3.4 MMOL/L (ref 3.5–5.1)
PROT SERPL-MCNC: 6.2 G/DL (ref 6–8.4)
RBC # BLD AUTO: 4.32 M/UL (ref 4–5.4)
SODIUM SERPL-SCNC: 136 MMOL/L (ref 136–145)
WBC # BLD AUTO: 4.46 K/UL (ref 3.9–12.7)

## 2024-12-15 PROCEDURE — 27000207 HC ISOLATION

## 2024-12-15 PROCEDURE — 84100 ASSAY OF PHOSPHORUS: CPT | Performed by: INTERNAL MEDICINE

## 2024-12-15 PROCEDURE — 21400001 HC TELEMETRY ROOM

## 2024-12-15 PROCEDURE — 51701 INSERT BLADDER CATHETER: CPT

## 2024-12-15 PROCEDURE — 94761 N-INVAS EAR/PLS OXIMETRY MLT: CPT

## 2024-12-15 PROCEDURE — 80053 COMPREHEN METABOLIC PANEL: CPT | Performed by: INTERNAL MEDICINE

## 2024-12-15 PROCEDURE — 36415 COLL VENOUS BLD VENIPUNCTURE: CPT | Performed by: INTERNAL MEDICINE

## 2024-12-15 PROCEDURE — 94640 AIRWAY INHALATION TREATMENT: CPT

## 2024-12-15 PROCEDURE — 63600175 PHARM REV CODE 636 W HCPCS: Performed by: INTERNAL MEDICINE

## 2024-12-15 PROCEDURE — 27000221 HC OXYGEN, UP TO 24 HOURS

## 2024-12-15 PROCEDURE — 25000003 PHARM REV CODE 250: Performed by: INTERNAL MEDICINE

## 2024-12-15 PROCEDURE — 83735 ASSAY OF MAGNESIUM: CPT | Performed by: INTERNAL MEDICINE

## 2024-12-15 PROCEDURE — 25000242 PHARM REV CODE 250 ALT 637 W/ HCPCS: Performed by: INTERNAL MEDICINE

## 2024-12-15 PROCEDURE — 99233 SBSQ HOSP IP/OBS HIGH 50: CPT | Mod: 95,,, | Performed by: INTERNAL MEDICINE

## 2024-12-15 PROCEDURE — 51798 US URINE CAPACITY MEASURE: CPT

## 2024-12-15 PROCEDURE — 85025 COMPLETE CBC W/AUTO DIFF WBC: CPT | Performed by: INTERNAL MEDICINE

## 2024-12-15 RX ORDER — GUAIFENESIN 600 MG/1
600 TABLET, EXTENDED RELEASE ORAL 2 TIMES DAILY
Status: DISCONTINUED | OUTPATIENT
Start: 2024-12-15 | End: 2024-12-17 | Stop reason: HOSPADM

## 2024-12-15 RX ORDER — DOCUSATE SODIUM 100 MG
200 CAPSULE ORAL
Status: DISCONTINUED | OUTPATIENT
Start: 2024-12-15 | End: 2024-12-17 | Stop reason: HOSPADM

## 2024-12-15 RX ORDER — LEVETIRACETAM 250 MG/1
500 TABLET ORAL 2 TIMES DAILY
Status: DISCONTINUED | OUTPATIENT
Start: 2024-12-15 | End: 2024-12-17 | Stop reason: HOSPADM

## 2024-12-15 RX ADMIN — GUAIFENESIN 600 MG: 600 TABLET ORAL at 08:12

## 2024-12-15 RX ADMIN — DULOXETINE HYDROCHLORIDE 20 MG: 20 CAPSULE, DELAYED RELEASE ORAL at 08:12

## 2024-12-15 RX ADMIN — IPRATROPIUM BROMIDE AND ALBUTEROL SULFATE 3 ML: .5; 2.5 SOLUTION RESPIRATORY (INHALATION) at 12:12

## 2024-12-15 RX ADMIN — LEVETIRACETAM 500 MG: 250 TABLET, FILM COATED ORAL at 08:12

## 2024-12-15 RX ADMIN — ENOXAPARIN SODIUM 40 MG: 40 INJECTION SUBCUTANEOUS at 04:12

## 2024-12-15 RX ADMIN — IPRATROPIUM BROMIDE AND ALBUTEROL SULFATE 3 ML: .5; 2.5 SOLUTION RESPIRATORY (INHALATION) at 07:12

## 2024-12-15 RX ADMIN — IPRATROPIUM BROMIDE AND ALBUTEROL SULFATE 3 ML: .5; 2.5 SOLUTION RESPIRATORY (INHALATION) at 03:12

## 2024-12-15 RX ADMIN — LEVETIRACETAM 500 MG: 250 TABLET, FILM COATED ORAL at 10:12

## 2024-12-15 RX ADMIN — IPRATROPIUM BROMIDE AND ALBUTEROL SULFATE 3 ML: .5; 2.5 SOLUTION RESPIRATORY (INHALATION) at 11:12

## 2024-12-15 RX ADMIN — LORAZEPAM 0.5 MG: 0.5 TABLET ORAL at 08:12

## 2024-12-15 RX ADMIN — ASPIRIN 325 MG ORAL TABLET 325 MG: 325 PILL ORAL at 08:12

## 2024-12-15 NOTE — AI DETERIORATION ALERT
Artificial Intelligence Notification  [address] [place] [address]  Phone: [phone]    This documentation was triggered by an Artificial Intelligence Notification:    Admit Date: 2024   LOS: 1  Code Status: Full Code  : 1945  Age: 79 y.o.  Weight:   Wt Readings from Last 1 Encounters:   24 54.5 kg (120 lb 2.4 oz)        Sex: female  Bed:   MRN: [id number]  Attending Physician: Sami Etienne DO     Date of Alert: 12/15/2024  Time AI Alert Received:              Vitals:    12/15/24 0038   BP:    Pulse: 78   Resp: 20   Temp:      SpO2: 96 %      Artificial Intelligence alert discussed with Provider:     Name:  ETTA   Date/Time of Provider Notification: NA      Patient Condition:  PRIMARY RN IN WITH PT PT IS AWAKE ALERT CALM IN NO DISTRESS AS REPORTED BY PRIMARY RN

## 2024-12-15 NOTE — PROGRESS NOTES
"[facility] Medicine  Progress Note    Patient Name: Meaghan Pan  MRN: [id number]  Patient Class: IP- Inpatient   Admission Date: 12/13/2024  Length of Stay: 1 days  Attending Physician: Sami Etienne DO  Primary Care Provider: Estee Fierro MD        Subjective     Principal Problem:Encephalopathy due to influenza A virus        HPI:  Ms. 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    She lives with her daughter, who notes that she suffered a stroke at age 41.  Apparently she was on hormone therapy and smoked which is suspected source of stroke. She suffered aphasia and right side hemiparesis following her stroke years ago. 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  She is followed at [facility], last seeing them in clinic on 12/4/24 for Botox therapy and adjustment of seizure medications.  At that visit her Keppra was increased to 500 mg qam, 125mg at 3pm, and 625 mg nightly.    She was seen in the ED x 2 on 12/12/24, the first time for UTI and muscle spasms treated with Klonopin and Macrobid.  She later returned on the same day with severe worsening of her muscle spasms, treated with Seroquel 25mg and Valium 5mg.  Her daughter has been giving her the Klonopin from the ED, but had to stop due to profound fatigue and lethargy.  She has seen mild improvement in her spasms today, but the real reason she brought her back was due to elevated BP at home.     Ms. Pan is a bit lethargic and unable to speak on exam.  Her daughter found her cheeks flushed, which is common when her BP shoots up (has had this happen in the past).  She checked it at home and found it to be 190/105, which frightened her and made her come to the ED.  She has seen " "no N/V/D, fever, chills, but does not hot flashes.      In the ED her VS were BP (!) 228/118 -> 225/98 -> 175/75 -> 152/95   Pulse 98 -> 85   Temp 98.5 °F (36.9 °C) (Axillary)   Resp (!) 24   Ht 5' 2" (1.575 m)   Wt 55 kg (121 lb 4.1 oz)   SpO2 95% RA  BMI 22.18 kg/m².  Labs showed NML CBC, Cr 0.8, Gluc 67 -> 120, Alb 3.4, Trop 0.233, COVID/FLU NEG, UA: nitrite +, LE 1+, WBC 15, many bacteria.    No radiographs done in the ED.  EKG showed NSR rate 90, NS ST-T changes, QTc 479 ms.    In the ED she was treated with:  Medications   labetalol 20 mg/4 mL (5 mg/mL) IV syring (5 mg Intravenous Given 12/13/24 2002)   dextrose 5 % and 0.45 % NaCl infusion (0 mLs Intravenous Stopped 12/13/24 2216)   labetalol 20 mg/4 mL (5 mg/mL) IV syring (10 mg Intravenous Given 12/13/24 2151)   LORazepam (ATIVAN) injection 0.5 mg (0.5 mg Intravenous Given 12/13/24 2208)   aspirin tablet 325 mg (325 mg Oral Given 12/13/24 2305)               Overview/Hospital Course:  79-year-old female who is cared at home by her daughter.  Has a history of CVA left MCA tear territory with difficulties with speech.  And right-sided paresis.  Her other medical problems including COPD glaucoma pancreatic cyst right AKA.  Secondary to blood clots and gangrene and seizure disorder patient is clinically doing much better than on arrival.  She is still having some spikes of her blood pressure that the daughter his concerned about at night currently she is under good control.  Labs are pending.  Issues from labs yesterday was a mild hyponatremia at 1:33 a.m. a metabolic acidosis with a bicarb at 19 CPK at 7:00 p.m. and some mild elevation of troponins.  We will continue to trend troponins.  And check CK-MBs.  CTA of the brain obtained on admission showed no acute abnormalities chest x-ray no acute abnormalities the patient is positive for influenza A it has been treated with Tamiflu.  Patient was able to speak words today in no distress repeat labs in the " morning given a mild elevation of troponins echocardiogram will be ordered.  And repeat cardiac markers    Interval History:     Review of Systems   Constitutional:  Positive for activity change and fever.   HENT: Negative.     Respiratory:  Negative for chest tightness and shortness of breath.    Gastrointestinal: Negative.    Endocrine: Negative.    Genitourinary: Negative.    Musculoskeletal: Negative.    Neurological:  Positive for weakness.   Hematological: Negative.    Psychiatric/Behavioral: Negative.       Objective:     Vital Signs (Most Recent):  Temp: 99 °F (37.2 °C) (12/15/24 0807)  Pulse: 100 (12/15/24 0847)  Resp: (!) 23 (12/15/24 0847)  BP: (!) 118/53 (12/15/24 0807)  SpO2: (!) 93 % (12/15/24 0847) Vital Signs (24h Range):  Temp:  [98.2 °F (36.8 °C)-102.7 °F (39.3 °C)] 99 °F (37.2 °C)  Pulse:  [] 100  Resp:  [18-36] 23  SpO2:  [92 %-98 %] 93 %  BP: (118-195)/() 118/53     Weight: 54.5 kg (120 lb 2.4 oz)  Body mass index is 21.98 kg/m².    Intake/Output Summary (Last 24 hours) at 12/15/2024 1005  Last data filed at 12/14/2024 1423  Gross per 24 hour   Intake 200 ml   Output --   Net 200 ml         Physical Exam  Constitutional:       Appearance: She is ill-appearing.   HENT:      Head: Normocephalic and atraumatic.      Mouth/Throat:      Mouth: Mucous membranes are moist.      Pharynx: Oropharynx is clear.   Eyes:      Extraocular Movements: Extraocular movements intact.      Pupils: Pupils are equal, round, and reactive to light.   Cardiovascular:      Rate and Rhythm: Normal rate and regular rhythm.   Pulmonary:      Breath sounds: Rhonchi present.      Comments: Patient is still has some mild wheezing over on the right side of her lungs.  But he has certainly improved in air movement over the last 24 hours  Abdominal:      General: Abdomen is flat. Bowel sounds are normal.      Palpations: Abdomen is soft.   Musculoskeletal:      Cervical back: Normal range of motion and neck supple.    Skin:     General: Skin is warm and dry.   Neurological:      Mental Status: She is alert.      Comments: Right-sided hemiparesis with contractures with speaking words today clinically much better   Psychiatric:         Mood and Affect: Mood normal.             Significant Labs: All pertinent labs within the past 24 hours have been reviewed.  ABGs:   Recent Labs   Lab 12/14/24  0036   PH 7.397   PCO2 31.9*   HCO3 19.6*   POCSATURATED 92   BE -5*   PO2 63*     Bilirubin:   Recent Labs   Lab 12/13/24 1837 12/14/24  0435   BILITOT 0.4 0.3     Blood Culture:   Recent Labs   Lab 12/14/24  0059   LABBLOO No Growth to date  No Growth to date     BMP:   Recent Labs   Lab 12/14/24 0435      *   K 3.5      CO2 19*   BUN 20   CREATININE 0.8   CALCIUM 8.1*   MG 2.2     CBC:   Recent Labs   Lab 12/13/24 1837 12/14/24 0435   WBC 7.43 6.31   HGB 12.2 11.1*   HCT 38.0 35.2*    205     CMP:   Recent Labs   Lab 12/13/24 1837 12/14/24  0435    133*   K 3.9 3.5    105   CO2 19* 19*   GLU 67* 100   BUN 23 20   CREATININE 0.8 0.8   CALCIUM 8.8 8.1*   PROT 7.3 6.2   ALBUMIN 3.4* 2.8*   BILITOT 0.4 0.3   ALKPHOS 92 76   AST 91* 94*   ALT 23 24   ANIONGAP 12 9       Significant Imaging: I have reviewed all pertinent imaging results/findings within the past 24 hours.    Assessment and Plan     * Encephalopathy due to influenza A virus  This has all been over the past 24 hours, mostly after all of the benzo's  She was given Klonopin from the ED, which her daughter states caused severe lethargy  She also got IV Ativan in the ED (only 0.5mg), but she is unable to interact with me much due to sedation  Check CT head stat to be sure no other issue at play  Bedside swallow evaluation (may have to be NPO for a time being)  Check ABG  Patient neurologically is doing much better awake alert talking encephalopathy has resolved this is likely metabolic encephalopathy.  And continues to improve.    Seizure  disorder as sequela of cerebrovascular accident  Seizure precautions, neuro checks q4    Continue home medications:    levETIRAcetam 100 mg/mL solution 650 mg, 650 mg, Oral, QHS     levetiracetam 500 mg/5 mL (5 mL) liquid Soln 130 mg, 130 mg, Oral, After lunch, 3pm    levETIRAcetam tablet 500 mg, 500 mg, Oral, am    Hypoglycemia  This improved rapidly with therapy  Gluc 67 -> 120      COPD (chronic obstructive pulmonary disease)  She has been admitted to the hospital for COPD exacerbation in the past on chart review  She seems to be a bit SOB on exam, so check ABG  Duonebs Q4 prn and Q6 hours    Clinically doing much better continue current management      Elevated troponin level not due to acute coronary syndrome  I suspect this is type II ischemia due to elevated BP  So far this is flat  Continue daily ASA 325mg po - dose given in ED  Trend trop  Ordered TTE in the am    Echocardiogram still pending she does have an elevated CKs so we will check CK-MB.  And continue to follow troponins.  This is most likely type 2 ischemia.  Any abnormalities in the echocardiogram further stress testing or cardiac workup may be necessary      Acute cystitis without hematuria  Per Neurology notes, she has anaphylaxis against PCN  Treating with IV Levaquin for now with care to history of seizures  Stop Macrodantin  Follow up CXS  Her blood cultures have remained negative she is positive for influenza a which she is on Tamiflu.  Urine cultures have shown E coli greater than 100,000. On Levaquin awaiting final sensitivities    Malignant essential hypertension  In the ED her VS were BP (!) 228/118 -> 225/98 -> 175/75 -> 152/95   She has no history of HTN, so suspect this may be reactive to her uncontrolled spasms and UTI   -Her daughter states this has happened in the past  Check CT head without contrast stat to be sure nothing central  Her BP has come down nicely, so will follow and treat PRN for now, as she drops fast at times as  "well per daughter  Stat CXR and BNP case discussed with daughter at bedside who has concerns that she is spiking blood pressures at nighttime.  Vitals has been reviewed and she did spike up 195/124 to patient has been treated with labetalol.  We will review medications see if we can get a better control for nighttime blood pressure  But currently back in a normotensive state at 118/153.    In the ED she was treated with:  Labetalol 20 mg/4 mL (5 mg/mL) IV syring (10 mg Intravenous Given 12/13/24 2151)  Labetalol 20 mg/4 mL (5 mg/mL) IV syring (5 mg Intravenous Given 12/13/24 2002)     Muscle spasms of lower extremity  She is followed at [facility], last seeing them in clinic on 12/4/24 for Botox therapy and adjustment of seizure medications.  By history, her daughter states these worsen during times of, "infection and inflammation."  These have worsened over the past few days since UTI  Stop the Benzodiazepines due to excessive sedation  Try Baclofen 5mg TID prn for now  Check CPK      Hx of AKA (above knee amputation), right  She lives with her daughter, who notes that she suffered a stroke at age 41.  Apparently she was on hormone therapy and smoked which is suspected source of stroke. She suffered aphasia and right side hemiparesis following her stroke years ago. 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  All of this occurred around the same time as well.  She has been on ASA 325mg daily since that time.      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  She lives with her " daughter, who notes that she suffered a stroke at age 41.  Apparently she was on hormone therapy and smoked which is suspected source of stroke. She suffered aphasia and right side hemiparesis following her stroke years ago.  She has been on ASA 325mg daily since that time.  CTA of the head on presentation no acute abnormalities        VTE Risk Mitigation (From admission, onward)           Ordered     enoxaparin injection 40 mg  Daily         12/14/24 0007     Place ANGELO hose  Until discontinued         12/14/24 0007     IP VTE HIGH RISK PATIENT  Once         12/14/24 0007     Place sequential compression device  Until discontinued         12/14/24 0007                    Discharge Planning   HERIBERTO:      Code Status: Full Code   Medical Readiness for Discharge Date:                    Please place Justification for DME        Sami Etienne DO  Department of Hospital Medicine   [facility]

## 2024-12-15 NOTE — ASSESSMENT & PLAN NOTE
I suspect this is type II ischemia due to elevated BP  So far this is flat  Continue daily ASA 325mg po - dose given in ED  Trend trop  Ordered TTE in the am    Echocardiogram still pending she does have an elevated CKs so we will check CK-MB.  And continue to follow troponins.  This is most likely type 2 ischemia.  Any abnormalities in the echocardiogram further stress testing or cardiac workup may be necessary

## 2024-12-15 NOTE — SUBJECTIVE & OBJECTIVE
Interval History:     Review of Systems   Constitutional:  Positive for activity change and fever.   HENT: Negative.     Respiratory:  Negative for chest tightness and shortness of breath.    Gastrointestinal: Negative.    Endocrine: Negative.    Genitourinary: Negative.    Musculoskeletal: Negative.    Neurological:  Positive for weakness.   Hematological: Negative.    Psychiatric/Behavioral: Negative.       Objective:     Vital Signs (Most Recent):  Temp: 99 °F (37.2 °C) (12/15/24 0807)  Pulse: 100 (12/15/24 0847)  Resp: (!) 23 (12/15/24 0847)  BP: (!) 118/53 (12/15/24 0807)  SpO2: (!) 93 % (12/15/24 0847) Vital Signs (24h Range):  Temp:  [98.2 °F (36.8 °C)-102.7 °F (39.3 °C)] 99 °F (37.2 °C)  Pulse:  [] 100  Resp:  [18-36] 23  SpO2:  [92 %-98 %] 93 %  BP: (118-195)/() 118/53     Weight: 54.5 kg (120 lb 2.4 oz)  Body mass index is 21.98 kg/m².    Intake/Output Summary (Last 24 hours) at 12/15/2024 1005  Last data filed at 12/14/2024 1423  Gross per 24 hour   Intake 200 ml   Output --   Net 200 ml         Physical Exam  Constitutional:       Appearance: She is ill-appearing.   HENT:      Head: Normocephalic and atraumatic.      Mouth/Throat:      Mouth: Mucous membranes are moist.      Pharynx: Oropharynx is clear.   Eyes:      Extraocular Movements: Extraocular movements intact.      Pupils: Pupils are equal, round, and reactive to light.   Cardiovascular:      Rate and Rhythm: Normal rate and regular rhythm.   Pulmonary:      Breath sounds: Rhonchi present.      Comments: Patient is still has some mild wheezing over on the right side of her lungs.  But he has certainly improved in air movement over the last 24 hours  Abdominal:      General: Abdomen is flat. Bowel sounds are normal.      Palpations: Abdomen is soft.   Musculoskeletal:      Cervical back: Normal range of motion and neck supple.   Skin:     General: Skin is warm and dry.   Neurological:      Mental Status: She is alert.      Comments:  Right-sided hemiparesis with contractures with speaking words today clinically much better   Psychiatric:         Mood and Affect: Mood normal.             Significant Labs: All pertinent labs within the past 24 hours have been reviewed.  ABGs:   Recent Labs   Lab 12/14/24  0036   PH 7.397   PCO2 31.9*   HCO3 19.6*   POCSATURATED 92   BE -5*   PO2 63*     Bilirubin:   Recent Labs   Lab 12/13/24 1837 12/14/24 0435   BILITOT 0.4 0.3     Blood Culture:   Recent Labs   Lab 12/14/24  0059   LABBLOO No Growth to date  No Growth to date     BMP:   Recent Labs   Lab 12/14/24 0435      *   K 3.5      CO2 19*   BUN 20   CREATININE 0.8   CALCIUM 8.1*   MG 2.2     CBC:   Recent Labs   Lab 12/13/24 1837 12/14/24 0435   WBC 7.43 6.31   HGB 12.2 11.1*   HCT 38.0 35.2*    205     CMP:   Recent Labs   Lab 12/13/24 1837 12/14/24  0435    133*   K 3.9 3.5    105   CO2 19* 19*   GLU 67* 100   BUN 23 20   CREATININE 0.8 0.8   CALCIUM 8.8 8.1*   PROT 7.3 6.2   ALBUMIN 3.4* 2.8*   BILITOT 0.4 0.3   ALKPHOS 92 76   AST 91* 94*   ALT 23 24   ANIONGAP 12 9       Significant Imaging: I have reviewed all pertinent imaging results/findings within the past 24 hours.

## 2024-12-15 NOTE — PLAN OF CARE
Problem: Gas Exchange Impaired  Goal: Optimal Gas Exchange  Outcome: Progressing  Intervention: Optimize Oxygenation and Ventilation  Flowsheets (Taken 12/15/2024 2198)  Airway/Ventilation Management: airway patency maintained  Head of Bed (HOB) Positioning: HOB elevated   Patient in no apparent distress. Patient on room air. Aerosol treatments given q 8  . Will continue to monitor.

## 2024-12-15 NOTE — ASSESSMENT & PLAN NOTE
In the ED her VS were BP (!) 228/118 -> 225/98 -> 175/75 -> 152/95   She has no history of HTN, so suspect this may be reactive to her uncontrolled spasms and UTI   -Her daughter states this has happened in the past  Check CT head without contrast stat to be sure nothing central  Her BP has come down nicely, so will follow and treat PRN for now, as she drops fast at times as well per daughter  Stat CXR and BNP case discussed with daughter at bedside who has concerns that she is spiking blood pressures at nighttime.  Vitals has been reviewed and she did spike up 195/124 to patient has been treated with labetalol.  We will review medications see if we can get a better control for nighttime blood pressure  But currently back in a normotensive state at 118/153.    In the ED she was treated with:  Labetalol 20 mg/4 mL (5 mg/mL) IV syring (10 mg Intravenous Given 12/13/24 2151)  Labetalol 20 mg/4 mL (5 mg/mL) IV syring (5 mg Intravenous Given 12/13/24 2002)

## 2024-12-15 NOTE — ASSESSMENT & PLAN NOTE
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  She lives with her daughter, who notes that she suffered a stroke at age 41.  Apparently she was on hormone therapy and smoked which is suspected source of stroke. She suffered aphasia and right side hemiparesis following her stroke years ago.  She has been on ASA 325mg daily since that time.  CTA of the head on presentation no acute abnormalities

## 2024-12-15 NOTE — ASSESSMENT & PLAN NOTE
Per Neurology notes, she has anaphylaxis against PCN  Treating with IV Levaquin for now with care to history of seizures  Stop Macrodantin  Follow up CXS  Her blood cultures have remained negative she is positive for influenza a which she is on Tamiflu.  Urine cultures have shown E coli greater than 100,000. On Levaquin awaiting final sensitivities

## 2024-12-15 NOTE — HOSPITAL COURSE
Patient admitted for acute metabolic encephalopathy 2/2 acute Ecoli UTI and influenza A infection. Treated with abx and supportive care initially. Developed wheezing during hospital stay. Initiated treatment for influenza and COPD exacerbation with steroids and tamiflu. Mild elevation in troponin in setting of infection, HTN, and CKD. TTE completed with no WMA and normal EF. Starting amlodipine 5mg daily to be held if SBP <120. Patient is improved and is near her clinical baseline per daughter. She has been evaluated and it is determined that she would benefit from SNF at discharge. Arrangements have been made. Patient has received maximum benefit from inpatient stay and is medically clear for discharge. Provided discharge instructions and return precautions.

## 2024-12-15 NOTE — ASSESSMENT & PLAN NOTE
Seizure precautions, neuro checks q4    Continue home medications:    levETIRAcetam 100 mg/mL solution 650 mg, 650 mg, Oral, QHS     levetiracetam 500 mg/5 mL (5 mL) liquid Soln 130 mg, 130 mg, Oral, After lunch, 3pm    levETIRAcetam tablet 500 mg, 500 mg, Oral, am

## 2024-12-15 NOTE — ASSESSMENT & PLAN NOTE
This has all been over the past 24 hours, mostly after all of the benzo's  She was given Klonopin from the ED, which her daughter states caused severe lethargy  She also got IV Ativan in the ED (only 0.5mg), but she is unable to interact with me much due to sedation  Check CT head stat to be sure no other issue at play  Bedside swallow evaluation (may have to be NPO for a time being)  Check ABG  Patient neurologically is doing much better awake alert talking encephalopathy has resolved this is likely metabolic encephalopathy.  And continues to improve.

## 2024-12-15 NOTE — ASSESSMENT & PLAN NOTE
Nursing bedside swallow evaluation and SLP evaluation in am  This is a chronic issue since her stroke many years ago  Patient has been cleared from a swallowing study and advancing diet

## 2024-12-15 NOTE — ASSESSMENT & PLAN NOTE
She has been admitted to the hospital for COPD exacerbation in the past on chart review  She seems to be a bit SOB on exam, so check ABG  Duonebs Q4 prn and Q6 hours    Clinically doing much better continue current management

## 2024-12-15 NOTE — ASSESSMENT & PLAN NOTE
"She is followed at [facility], last seeing them in clinic on 12/4/24 for Botox therapy and adjustment of seizure medications.  By history, her daughter states these worsen during times of, "infection and inflammation."  These have worsened over the past few days since UTI  Stop the Benzodiazepines due to excessive sedation  Try Baclofen 5mg TID prn for now  Check CPK    "

## 2024-12-16 PROBLEM — J11.1 INFLUENZA: Status: ACTIVE | Noted: 2024-12-16

## 2024-12-16 PROBLEM — N18.31 CKD STAGE 3A, GFR 45-59 ML/MIN: Status: ACTIVE | Noted: 2024-12-16

## 2024-12-16 LAB
ALBUMIN SERPL BCP-MCNC: 2.4 G/DL (ref 3.5–5.2)
ALP SERPL-CCNC: 63 U/L (ref 40–150)
ALT SERPL W/O P-5'-P-CCNC: 23 U/L (ref 10–44)
ANION GAP SERPL CALC-SCNC: 8 MMOL/L (ref 8–16)
AORTIC ROOT ANNULUS: 3.16 CM
AORTIC VALVE CUSP SEPERATION: 1.43 CM
ASCENDING AORTA: 2.64 CM
AST SERPL-CCNC: 56 U/L (ref 10–40)
AV INDEX (PROSTH): 1.09
AV MEAN GRADIENT: 3.3 MMHG
AV PEAK GRADIENT: 5.8 MMHG
AV VALVE AREA BY VELOCITY RATIO: 2.1 CM²
AV VALVE AREA: 2.5 CM²
AV VELOCITY RATIO: 0.92
BACTERIA UR CULT: ABNORMAL
BASOPHILS # BLD AUTO: 0.02 K/UL (ref 0–0.2)
BASOPHILS # BLD AUTO: 0.02 K/UL (ref 0–0.2)
BASOPHILS NFR BLD: 0.5 % (ref 0–1.9)
BASOPHILS NFR BLD: 0.5 % (ref 0–1.9)
BILIRUB SERPL-MCNC: 0.2 MG/DL (ref 0.1–1)
BSA FOR ECHO PROCEDURE: 1.54 M2
BUN SERPL-MCNC: 13 MG/DL (ref 8–23)
CALCIUM SERPL-MCNC: 7.9 MG/DL (ref 8.7–10.5)
CHLORIDE SERPL-SCNC: 110 MMOL/L (ref 95–110)
CK SERPL-CCNC: 423 U/L (ref 20–180)
CO2 SERPL-SCNC: 21 MMOL/L (ref 23–29)
CREAT SERPL-MCNC: 0.7 MG/DL (ref 0.5–1.4)
CV ECHO LV RWT: 1 CM
DIFFERENTIAL METHOD BLD: ABNORMAL
DIFFERENTIAL METHOD BLD: ABNORMAL
DOP CALC AO PEAK VEL: 1.2 M/S
DOP CALC AO VTI: 25.3 CM
DOP CALC LVOT AREA: 2.3 CM2
DOP CALC LVOT DIAMETER: 1.7 CM
DOP CALC LVOT PEAK VEL: 1.1 M/S
DOP CALC LVOT STROKE VOLUME: 62.6 CM3
DOP CALC MV VTI: 42.5 CM
DOP CALCLVOT PEAK VEL VTI: 27.6 CM
E WAVE DECELERATION TIME: 211.44 MSEC
E/A RATIO: 0.64
E/E' RATIO: 25.78 M/S
ECHO LV POSTERIOR WALL: 1.3 CM (ref 0.6–1.1)
EJECTION FRACTION: 60 %
EOSINOPHIL # BLD AUTO: 0 K/UL (ref 0–0.5)
EOSINOPHIL # BLD AUTO: 0 K/UL (ref 0–0.5)
EOSINOPHIL NFR BLD: 0.8 % (ref 0–8)
EOSINOPHIL NFR BLD: 0.8 % (ref 0–8)
ERYTHROCYTE [DISTWIDTH] IN BLOOD BY AUTOMATED COUNT: 15.7 % (ref 11.5–14.5)
ERYTHROCYTE [DISTWIDTH] IN BLOOD BY AUTOMATED COUNT: 15.7 % (ref 11.5–14.5)
EST. GFR  (NO RACE VARIABLE): >60 ML/MIN/1.73 M^2
FRACTIONAL SHORTENING: 30.8 % (ref 28–44)
GLOBAL LONGITUIDAL STRAIN: 14 %
GLUCOSE SERPL-MCNC: 100 MG/DL (ref 70–110)
HCT VFR BLD AUTO: 35.4 % (ref 37–48.5)
HCT VFR BLD AUTO: 35.4 % (ref 37–48.5)
HGB BLD-MCNC: 11.2 G/DL (ref 12–16)
HGB BLD-MCNC: 11.2 G/DL (ref 12–16)
IMM GRANULOCYTES # BLD AUTO: 0.01 K/UL (ref 0–0.04)
IMM GRANULOCYTES # BLD AUTO: 0.01 K/UL (ref 0–0.04)
IMM GRANULOCYTES NFR BLD AUTO: 0.3 % (ref 0–0.5)
IMM GRANULOCYTES NFR BLD AUTO: 0.3 % (ref 0–0.5)
INTERVENTRICULAR SEPTUM: 1.4 CM (ref 0.6–1.1)
IVC DIAMETER: 1.71 CM
LA MAJOR: 4.19 CM
LA MINOR: 3.09 CM
LEFT ATRIUM AREA SYSTOLIC (APICAL 2 CHAMBER): 7.19 CM2
LEFT ATRIUM AREA SYSTOLIC (APICAL 4 CHAMBER): 12.3 CM2
LEFT ATRIUM SIZE: 3.92 CM
LEFT ATRIUM VOLUME INDEX MOD: 12 ML/M2
LEFT ATRIUM VOLUME MOD: 18.5 ML
LEFT INTERNAL DIMENSION IN SYSTOLE: 1.8 CM (ref 2.1–4)
LEFT VENTRICLE DIASTOLIC VOLUME INDEX: 16.01 ML/M2
LEFT VENTRICLE DIASTOLIC VOLUME: 24.66 ML
LEFT VENTRICLE END SYSTOLIC VOLUME APICAL 2 CHAMBER: 10.75 ML
LEFT VENTRICLE END SYSTOLIC VOLUME APICAL 4 CHAMBER: 28.45 ML
LEFT VENTRICLE MASS INDEX: 71.3 G/M2
LEFT VENTRICLE SYSTOLIC VOLUME INDEX: 6.3 ML/M2
LEFT VENTRICLE SYSTOLIC VOLUME: 9.76 ML
LEFT VENTRICULAR INTERNAL DIMENSION IN DIASTOLE: 2.6 CM (ref 3.5–6)
LEFT VENTRICULAR MASS: 109.8 G
LV LATERAL E/E' RATIO: 29 M/S
LV SEPTAL E/E' RATIO: 23.2 M/S
LVED V (TEICH): 24.66 ML
LVES V (TEICH): 9.76 ML
LVOT MG: 3.75 MMHG
LVOT MV: 0.94 CM/S
LYMPHOCYTES # BLD AUTO: 1.1 K/UL (ref 1–4.8)
LYMPHOCYTES # BLD AUTO: 1.1 K/UL (ref 1–4.8)
LYMPHOCYTES NFR BLD: 30.5 % (ref 18–48)
LYMPHOCYTES NFR BLD: 30.5 % (ref 18–48)
MAGNESIUM SERPL-MCNC: 2 MG/DL (ref 1.6–2.6)
MCH RBC QN AUTO: 27.9 PG (ref 27–31)
MCH RBC QN AUTO: 27.9 PG (ref 27–31)
MCHC RBC AUTO-ENTMCNC: 31.6 G/DL (ref 32–36)
MCHC RBC AUTO-ENTMCNC: 31.6 G/DL (ref 32–36)
MCV RBC AUTO: 88 FL (ref 82–98)
MCV RBC AUTO: 88 FL (ref 82–98)
MONOCYTES # BLD AUTO: 0.4 K/UL (ref 0.3–1)
MONOCYTES # BLD AUTO: 0.4 K/UL (ref 0.3–1)
MONOCYTES NFR BLD: 10.4 % (ref 4–15)
MONOCYTES NFR BLD: 10.4 % (ref 4–15)
MV MEAN GRADIENT: 8 MMHG
MV PEAK A VEL: 1.81 M/S
MV PEAK E VEL: 1.16 M/S
MV PEAK GRADIENT: 17 MMHG
MV STENOSIS PRESSURE HALF TIME: 61.32 MS
MV VALVE AREA BY CONTINUITY EQUATION: 1.47 CM2
MV VALVE AREA P 1/2 METHOD: 3.59 CM2
NEUTROPHILS # BLD AUTO: 2.1 K/UL (ref 1.8–7.7)
NEUTROPHILS # BLD AUTO: 2.1 K/UL (ref 1.8–7.7)
NEUTROPHILS NFR BLD: 57.5 % (ref 38–73)
NEUTROPHILS NFR BLD: 57.5 % (ref 38–73)
NRBC BLD-RTO: 0 /100 WBC
NRBC BLD-RTO: 0 /100 WBC
OHS CV RV/LV RATIO: 0.88 CM
OHS QRS DURATION: 86 MS
OHS QTC CALCULATION: 479 MS
PHOSPHATE SERPL-MCNC: 2.4 MG/DL (ref 2.7–4.5)
PISA MRMAX VEL: 3.96 M/S
PISA TR MAX VEL: 1.76 M/S
PLATELET # BLD AUTO: 209 K/UL (ref 150–450)
PLATELET # BLD AUTO: 209 K/UL (ref 150–450)
PMV BLD AUTO: 10 FL (ref 9.2–12.9)
PMV BLD AUTO: 10 FL (ref 9.2–12.9)
POTASSIUM SERPL-SCNC: 3.2 MMOL/L (ref 3.5–5.1)
PROT SERPL-MCNC: 5.6 G/DL (ref 6–8.4)
PV MV: 0.84 M/S
PV PEAK GRADIENT: 4 MMHG
PV PEAK VELOCITY: 1.02 M/S
RA MAJOR: 3.41 CM
RA PRESSURE ESTIMATED: 3 MMHG
RA WIDTH: 2.25 CM
RBC # BLD AUTO: 4.02 M/UL (ref 4–5.4)
RBC # BLD AUTO: 4.02 M/UL (ref 4–5.4)
RIGHT VENTRICLE DIASTOLIC BASEL DIMENSION: 2.3 CM
RIGHT VENTRICLE DIASTOLIC LENGTH: 5.6 CM
RIGHT VENTRICLE DIASTOLIC MID DIMENSION: 1.6 CM
RIGHT VENTRICLE FREE WALL STRAIN: 18.9 %
RIGHT VENTRICLE GLOBAL SYSTOLIC STRAIN: 15.9 %
RIGHT VENTRICULAR END-DIASTOLIC DIMENSION: 2.05 CM
RIGHT VENTRICULAR LENGTH IN DIASTOLE (APICAL 4-CHAMBER VIEW): 5.57 CM
RV MID DIAMA: 1.58 CM
RV TB RVSP: 5 MMHG
SINUS: 2.69 CM
SODIUM SERPL-SCNC: 139 MMOL/L (ref 136–145)
STJ: 2.33 CM
TDI LATERAL: 0.04 M/S
TDI SEPTAL: 0.05 M/S
TDI: 0.05 M/S
TR MAX PG: 12 MMHG
TRICUSPID ANNULAR PLANE SYSTOLIC EXCURSION: 1.6 CM
TRICUSPID VALVE PEAK A WAVE VELOCITY: 0.67 M/S
TROPONIN I SERPL DL<=0.01 NG/ML-MCNC: 0.14 NG/ML (ref 0–0.03)
TV PEAK E VEL: 0.6 M/S
TV REST PULMONARY ARTERY PRESSURE: 15 MMHG
WBC # BLD AUTO: 3.64 K/UL (ref 3.9–12.7)
WBC # BLD AUTO: 3.64 K/UL (ref 3.9–12.7)
Z-SCORE OF LEFT VENTRICULAR DIMENSION IN END DIASTOLE: -5.43
Z-SCORE OF LEFT VENTRICULAR DIMENSION IN END SYSTOLE: -3.43

## 2024-12-16 PROCEDURE — 25000003 PHARM REV CODE 250: Performed by: STUDENT IN AN ORGANIZED HEALTH CARE EDUCATION/TRAINING PROGRAM

## 2024-12-16 PROCEDURE — 25000242 PHARM REV CODE 250 ALT 637 W/ HCPCS: Performed by: INTERNAL MEDICINE

## 2024-12-16 PROCEDURE — 83735 ASSAY OF MAGNESIUM: CPT | Performed by: INTERNAL MEDICINE

## 2024-12-16 PROCEDURE — 27000207 HC ISOLATION

## 2024-12-16 PROCEDURE — 94640 AIRWAY INHALATION TREATMENT: CPT

## 2024-12-16 PROCEDURE — 25000003 PHARM REV CODE 250: Performed by: INTERNAL MEDICINE

## 2024-12-16 PROCEDURE — 97163 PT EVAL HIGH COMPLEX 45 MIN: CPT

## 2024-12-16 PROCEDURE — 99233 SBSQ HOSP IP/OBS HIGH 50: CPT | Mod: ,,, | Performed by: STUDENT IN AN ORGANIZED HEALTH CARE EDUCATION/TRAINING PROGRAM

## 2024-12-16 PROCEDURE — 63600175 PHARM REV CODE 636 W HCPCS: Performed by: INTERNAL MEDICINE

## 2024-12-16 PROCEDURE — 94761 N-INVAS EAR/PLS OXIMETRY MLT: CPT

## 2024-12-16 PROCEDURE — 85025 COMPLETE CBC W/AUTO DIFF WBC: CPT | Performed by: INTERNAL MEDICINE

## 2024-12-16 PROCEDURE — 27000221 HC OXYGEN, UP TO 24 HOURS

## 2024-12-16 PROCEDURE — 99900035 HC TECH TIME PER 15 MIN (STAT)

## 2024-12-16 PROCEDURE — 92526 ORAL FUNCTION THERAPY: CPT

## 2024-12-16 PROCEDURE — 84100 ASSAY OF PHOSPHORUS: CPT | Performed by: INTERNAL MEDICINE

## 2024-12-16 PROCEDURE — 94664 DEMO&/EVAL PT USE INHALER: CPT

## 2024-12-16 PROCEDURE — 82550 ASSAY OF CK (CPK): CPT | Performed by: INTERNAL MEDICINE

## 2024-12-16 PROCEDURE — 92610 EVALUATE SWALLOWING FUNCTION: CPT

## 2024-12-16 PROCEDURE — 80053 COMPREHEN METABOLIC PANEL: CPT | Performed by: INTERNAL MEDICINE

## 2024-12-16 PROCEDURE — 84484 ASSAY OF TROPONIN QUANT: CPT | Performed by: INTERNAL MEDICINE

## 2024-12-16 PROCEDURE — 21400001 HC TELEMETRY ROOM

## 2024-12-16 RX ORDER — OSELTAMIVIR PHOSPHATE 30 MG/1
30 CAPSULE ORAL 2 TIMES DAILY
Status: DISCONTINUED | OUTPATIENT
Start: 2024-12-17 | End: 2024-12-17 | Stop reason: HOSPADM

## 2024-12-16 RX ORDER — SIMETHICONE 80 MG
1 TABLET,CHEWABLE ORAL 4 TIMES DAILY PRN
Status: DISCONTINUED | OUTPATIENT
Start: 2024-12-16 | End: 2024-12-17 | Stop reason: HOSPADM

## 2024-12-16 RX ORDER — LANOLIN ALCOHOL/MO/W.PET/CERES
800 CREAM (GRAM) TOPICAL
Status: DISCONTINUED | OUTPATIENT
Start: 2024-12-16 | End: 2024-12-17 | Stop reason: HOSPADM

## 2024-12-16 RX ORDER — OLANZAPINE 10 MG/2ML
2.5 INJECTION, POWDER, FOR SOLUTION INTRAMUSCULAR NIGHTLY PRN
Status: DISCONTINUED | OUTPATIENT
Start: 2024-12-16 | End: 2024-12-17 | Stop reason: HOSPADM

## 2024-12-16 RX ORDER — AMOXICILLIN 250 MG
1 CAPSULE ORAL 2 TIMES DAILY PRN
Status: DISCONTINUED | OUTPATIENT
Start: 2024-12-16 | End: 2024-12-17 | Stop reason: HOSPADM

## 2024-12-16 RX ORDER — LEVOFLOXACIN 5 MG/ML
750 INJECTION, SOLUTION INTRAVENOUS
Status: DISCONTINUED | OUTPATIENT
Start: 2024-12-17 | End: 2024-12-17 | Stop reason: HOSPADM

## 2024-12-16 RX ORDER — ASPIRIN 325 MG
325 TABLET ORAL DAILY
Status: DISCONTINUED | OUTPATIENT
Start: 2024-12-16 | End: 2024-12-17 | Stop reason: HOSPADM

## 2024-12-16 RX ORDER — PREDNISONE 20 MG/1
40 TABLET ORAL DAILY
Status: DISCONTINUED | OUTPATIENT
Start: 2024-12-16 | End: 2024-12-17 | Stop reason: HOSPADM

## 2024-12-16 RX ORDER — SODIUM,POTASSIUM PHOSPHATES 280-250MG
2 POWDER IN PACKET (EA) ORAL
Status: DISCONTINUED | OUTPATIENT
Start: 2024-12-16 | End: 2024-12-17 | Stop reason: HOSPADM

## 2024-12-16 RX ORDER — ACETAMINOPHEN 325 MG/1
650 TABLET ORAL EVERY 6 HOURS PRN
Status: DISCONTINUED | OUTPATIENT
Start: 2024-12-16 | End: 2024-12-17 | Stop reason: HOSPADM

## 2024-12-16 RX ORDER — OSELTAMIVIR PHOSPHATE 75 MG/1
75 CAPSULE ORAL ONCE
Status: COMPLETED | OUTPATIENT
Start: 2024-12-16 | End: 2024-12-16

## 2024-12-16 RX ORDER — SODIUM,POTASSIUM PHOSPHATES 280-250MG
2 POWDER IN PACKET (EA) ORAL ONCE
Status: COMPLETED | OUTPATIENT
Start: 2024-12-16 | End: 2024-12-16

## 2024-12-16 RX ADMIN — POTASSIUM BICARBONATE 35 MEQ: 391 TABLET, EFFERVESCENT ORAL at 06:12

## 2024-12-16 RX ADMIN — OSELTAMIVIR PHOSPHATE 75 MG: 75 CAPSULE ORAL at 09:12

## 2024-12-16 RX ADMIN — LEVETIRACETAM 500 MG: 250 TABLET, FILM COATED ORAL at 08:12

## 2024-12-16 RX ADMIN — LEVOFLOXACIN 750 MG: 750 INJECTION, SOLUTION INTRAVENOUS at 01:12

## 2024-12-16 RX ADMIN — POTASSIUM & SODIUM PHOSPHATES POWDER PACK 280-160-250 MG 2 PACKET: 280-160-250 PACK at 08:12

## 2024-12-16 RX ADMIN — IPRATROPIUM BROMIDE AND ALBUTEROL SULFATE 3 ML: .5; 2.5 SOLUTION RESPIRATORY (INHALATION) at 04:12

## 2024-12-16 RX ADMIN — DULOXETINE HYDROCHLORIDE 20 MG: 20 CAPSULE, DELAYED RELEASE ORAL at 09:12

## 2024-12-16 RX ADMIN — IPRATROPIUM BROMIDE AND ALBUTEROL SULFATE 3 ML: .5; 2.5 SOLUTION RESPIRATORY (INHALATION) at 07:12

## 2024-12-16 RX ADMIN — PREDNISONE 40 MG: 20 TABLET ORAL at 06:12

## 2024-12-16 RX ADMIN — LEVETIRACETAM 500 MG: 250 TABLET, FILM COATED ORAL at 09:12

## 2024-12-16 RX ADMIN — IPRATROPIUM BROMIDE AND ALBUTEROL SULFATE 3 ML: .5; 2.5 SOLUTION RESPIRATORY (INHALATION) at 10:12

## 2024-12-16 RX ADMIN — GUAIFENESIN 600 MG: 600 TABLET ORAL at 08:12

## 2024-12-16 RX ADMIN — ENOXAPARIN SODIUM 40 MG: 40 INJECTION SUBCUTANEOUS at 06:12

## 2024-12-16 RX ADMIN — Medication 200 ML: at 09:12

## 2024-12-16 RX ADMIN — IPRATROPIUM BROMIDE AND ALBUTEROL SULFATE 3 ML: .5; 2.5 SOLUTION RESPIRATORY (INHALATION) at 03:12

## 2024-12-16 RX ADMIN — ASPIRIN 325 MG ORAL TABLET 325 MG: 325 PILL ORAL at 08:12

## 2024-12-16 RX ADMIN — POTASSIUM BICARBONATE 35 MEQ: 391 TABLET, EFFERVESCENT ORAL at 09:12

## 2024-12-16 RX ADMIN — GUAIFENESIN 600 MG: 600 TABLET ORAL at 09:12

## 2024-12-16 NOTE — ASSESSMENT & PLAN NOTE
This has all been over the past 24 hours leading up to admission, mostly after all of the benzo's  She was given Klonopin from the ED, which her daughter states caused severe lethargy  She also got IV Ativan in the ED (only 0.5mg), but she is unable to interact with me much due to sedation  Patient neurologically is doing much better awake alert talking encephalopathy has resolved this is likely metabolic encephalopathy.  And continues to improve.

## 2024-12-16 NOTE — ASSESSMENT & PLAN NOTE
She has been admitted to the hospital for COPD exacerbation in the past on chart review  She seems to be a bit SOB on exam, so check ABG  Duonebs Q4 prn and Q6 hours  Continue steroids, tamiflu, abx

## 2024-12-16 NOTE — SUBJECTIVE & OBJECTIVE
Interval History: Agitated overnight. Received IV ativan. Will discontinue this to avoid paradoxical agitation given advanced age and likely underlying dementia. Per daughter patient is not on this medication at home. Also discontinuing morphine and baclofen to avoid oversedation. Per daughter patient very rarely takes this medication at home. PRN zyprexa ordered if patient becomes agitated again. Explained to daughter that if we have to give any medications for agitation there is associated risk but I do feel that zyprexa is a safer choice overall. No hx of parkinsons disease. Per daughter patient is closer to her baseline at this time. No documented hx of COPD but was a former smoker. Having BM's.     Review of Systems   All other systems reviewed and are negative.    Objective:     Vital Signs (Most Recent):  Temp: 98.5 °F (36.9 °C) (12/16/24 0730)  Pulse: 91 (12/16/24 0731)  Resp: 18 (12/16/24 0731)  BP: 134/61 (12/16/24 0730)  SpO2: 98 % (12/16/24 0731) Vital Signs (24h Range):  Temp:  [97.4 °F (36.3 °C)-98.7 °F (37.1 °C)] 98.5 °F (36.9 °C)  Pulse:  [79-96] 91  Resp:  [17-26] 18  SpO2:  [91 %-100 %] 98 %  BP: (115-163)/(56-72) 134/61     Weight: 54.5 kg (120 lb 2.4 oz)  Body mass index is 21.98 kg/m².    Intake/Output Summary (Last 24 hours) at 12/16/2024 1035  Last data filed at 12/16/2024 0347  Gross per 24 hour   Intake 150.31 ml   Output 500 ml   Net -349.69 ml         Physical Exam      Vitals reviewed  General: NAD, Well developed, Well Nourished  Head: NC/AT  Eyes: EOMI, SHANNON  Cardiovascular: Pulses intact distally, Regular Rate and rhythm  Pulmonary: Normal Respiratory Rate, No respiratory distress  Gi: Soft, Non-tender  Extremities: Warm, No edema present LLE. Right AKA  Skin: Warm, dry  Neuro: alert, aphasic, chronic weakness right side  Psych: Appropriate mood and affect      Significant Labs: All pertinent labs within the past 24 hours have been reviewed.    Significant Imaging: I have reviewed all  pertinent imaging results/findings within the past 24 hours.

## 2024-12-16 NOTE — PLAN OF CARE
[facility] Comprehensive Care Unit  Initial Discharge Assessment       Primary Care Provider: Estee Fierro MD    Admission Diagnosis: Hypertension [I10]  Troponin level elevated [R79.89]  Hypertensive urgency [I16.0]  Chest pain [R07.9]    Admission Date: 12/13/2024  Expected Discharge Date: 12/17/2024         Payor: MEDICARE / Plan: MEDICARE PART A & B / Product Type: Government /     Extended Emergency Contact Information  Primary Emergency Contact: AlexiaAdriana solano  Address: [address] [place] [address] [place]  Home Phone: [phone]  Mobile Phone: [phone]  Relation: Daughter  Preferred language: English   needed? No    Discharge Plan A: Skilled Nursing Facility  Discharge Plan B: Skilled Nursing Facility   met with patient's Daughter -Adriana Hale [phone]  at bedside. Pt is cared for in her home with her  and patients grand daughter. Pt s Daughter asked for Skilled care for her mother. pT DOES NTO HAVE hh. Pt uses wheelchair at all times. Pt is independent in dressing ADLS with her daughter help .Pt was having difficulty speaking. Pt tried to communicate with slurred speech. Pt will discharge to SNF when medically ready.      [id number] [place] - [address]  [address]  [place] [address]  Phone: [phone] Fax: [phone]    [id number] - [place], [place] - [address]  [address] [place] [address]  Phone: [phone] Fax: [phone]      Initial Assessment (most recent)       Adult Discharge Assessment - 12/16/24 1753          Discharge Assessment    Assessment Type Discharge Planning Assessment     Confirmed/corrected address, phone number and insurance Yes     Confirmed Demographics Correct on Facesheet     Source of Information family     When was your last doctors appointment? 12/10/24     Communicated HERIBERTO with patient/caregiver Yes     People in Home  child(walter), adult     Facility Arrived From: home     Do you expect to return to your current living situation? Yes   SNF    Do you have help at home or someone to help you manage your care at home? Yes     Who are your caregiver(s) and their phone number(s)? Adriana Hale [phone]     Prior to hospitilization cognitive status: Unable to Assess     Current cognitive status: Unable to Assess     Walking or Climbing Stairs Difficulty yes     Walking or Climbing Stairs ambulation difficulty, assistance 1 person     Dressing/Bathing Difficulty no     Home Accessibility wheelchair accessible     Home Layout Able to live on 1st floor     Equipment Currently Used at Home shower chair;bath bench;wheelchair;grab bar     Readmission within 30 days? No     Patient currently being followed by outpatient case management? No     Do you currently have service(s) that help you manage your care at home? No     Do you take prescription medications? Yes     Do you have prescription coverage? Yes     Do you have any problems affording any of your prescribed medications? No     Is the patient taking medications as prescribed? yes     How do you get to doctors appointments? family or friend will provide     Are you on dialysis? No     Do you take coumadin? No     Discharge Plan A Skilled Nursing Facility     Discharge Plan B Skilled Nursing Facility     DME Needed Upon Discharge  other (see comments)        Physical Activity    On average, how many days per week do you engage in moderate to strenuous exercise (like a brisk walk)? 0 days     On average, how many minutes do you engage in exercise at this level? 0 min        Financial Resource Strain    How hard is it for you to pay for the very basics like food, housing, medical care, and heating? Not very hard        Housing Stability    In the last 12 months, was there a time when you were not able to pay the mortgage or rent on time? No     At any time in the past 12 months, were  you homeless or living in a shelter (including now)? No        Transportation Needs    Has the lack of transportation kept you from medical appointments, meetings, work or from getting things needed for daily living? No        Food Insecurity    Within the past 12 months, you worried that your food would run out before you got the money to buy more. Never true     Within the past 12 months, the food you bought just didn't last and you didn't have money to get more. Never true        Stress    Do you feel stress - tense, restless, nervous, or anxious, or unable to sleep at night because your mind is troubled all the time - these days? Not at all        Social Isolation    How often do you feel lonely or isolated from those around you?  Never        Alcohol Use    Q1: How often do you have a drink containing alcohol? Never     Q2: How many drinks containing alcohol do you have on a typical day when you are drinking? Patient does not drink     Q3: How often do you have six or more drinks on one occasion? Never        Utilities    In the past 12 months has the electric, gas, oil, or water company threatened to shut off services in your home? No        OTHER    Name(s) of People in Home Daughter, DAughters , CRISTIAN Hale [phone]

## 2024-12-16 NOTE — ASSESSMENT & PLAN NOTE
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  She lives with her daughter, who notes that she suffered a stroke at age 41.  Apparently she was on hormone therapy and smoked which is suspected source of stroke. She suffered aphasia and right side hemiparesis following her stroke years ago.  She has been on ASA 325mg daily since that time.  CT of the head on presentation no acute abnormalities

## 2024-12-16 NOTE — ASSESSMENT & PLAN NOTE
She is followed at [facility], last seeing them in clinic on 12/4/24 for Botox therapy and adjustment of seizure medications.  These have worsened over the past few days since UTI

## 2024-12-16 NOTE — ASSESSMENT & PLAN NOTE
I suspect this is type II ischemia due to elevated BP  So far this is flat  Continue daily ASA 325mg po - dose given in ED  Trended trop  Ordered TTE

## 2024-12-16 NOTE — PT/OT/SLP EVAL
Speech Language Pathology Evaluation  Bedside Swallow    Patient Name:  Meaghan Pan   MRN:  [id number]  Admitting Diagnosis: Encephalopathy due to influenza A virus    Recommendations:                 General Recommendations:   Monitor and modify swallow and communication as needed  Diet recommendations:  Easy to Chew Diet - IDDSI Level 7, Thin   Aspiration Precautions: 1 bite/sip at a time, Alternating bites/sips, Assistance with meals, Feed only when awake/alert, Frequent oral care, and HOB to 90 degrees, when using straws (place straw in L labial corner - weakness noted on R side)  General Precautions: Standard,    Communication strategies:  yes/no questions only and provide increased time to answer    Assessment:     Meaghan Pan is a 79 y.o. female who was admitted for encephalopathy due to influenza A virus. Meaghan has experienced intermittent dysphagia and has lived as a person with aphasia since the age of 41 2' to CVA of left MCA territory. Patient's daughter, who was at bedside, was the primary historian during the assessment. Daughter reported that patient has received speech therapy for both communication and swallowing for many years. She stated that dysphagia symptoms are inconsistent. Previously, any time the patient experienced difficulty swallowing, home health ST would provide services (VITAL STIM and breather respiratory muscle training). Daughter stated the patient has not experienced any significant swallowing difficulties recently. No history of pneumonia. Daughter reported some difficulties with medication recently 2' to lethargy caused by medication (Ativan).     Patient presented with limited verbal output (few words, able to answer some Y/N questions) and impaired receptive language skills. No overt communication barriers reported by daughter. She stated that the nursing staff has done a sufficient job of meeting the patient's needs thus far. Daughter is typically at bedside to assist  "with any communication difficulties. RN (Luis) administered medication at bedside. No overt s/s of aspiration noted with mixed consistencies, large pills, or pudding. Wheezing noted throughout assessment, daughter stated that this is patient's baseline. No change in wheeze or wet vocal quality noted during or after swallow. Daughter reported that patient has not eaten much during her stay 2' to dislike of modified diet (IDDSI - 6). At this time ST recommends thin liquids and upgrading diet to mechanical soft (IDDSI - 7).     ST will continue to follow patient to monitor the safety of swallow and modify communication as needed to ensure patient safety and quality of care.     History:     Past Medical History:   Diagnosis Date    Anxiety disorder, unspecified     Aphasia as late effect of cerebrovascular accident     COPD (chronic obstructive pulmonary disease)     GERD (gastroesophageal reflux disease)     Hyponatremia     Her sodium was low and Tegretol and HCTZ was discontinued. Her sodium has returned to normal.    Muscle spasm of right lower extremity     Spasticity: They feel Botox was more effective than Xeomin. This has been helpful with comfort and hygiene. Additional units for torticollis and in the pec helped with range of motion in neck and pain when raising left arm    Neuropathy     Pancreatic cyst     Seizure disorder     Spastic monoplegia of upper extremity     On right since the stroke    Stroke     old left MCA territory stroke with encephalomalacia    Unspecified glaucoma        Past Surgical History:   Procedure Laterality Date    HYSTERECTOMY      Right Above knee amputation         Social History: Patient lives with her daughter.    Modified Barium Swallow: 9/18/2019 reports "The oral phase is mildly delayed.   The soft palate elevation is within normal limits.     The pharyngeal phase is within normal limits.   The epiglottic motion is within normal limits.   There is no penetration or " "aspiration.   No significant residual barium is noted within the piriform sinuses or   vallecula.   The patient tolerated the barium soaked cookie and barium tablet well."    Chest X-Rays: Negative. No hx of pneumonia (per daughter report)     Prior diet: Regular texture, thin liquids      Subjective   Patient was pleasant and cooperative throughout assessment. Daughter present at bedside.     Pain/Comfort:  Pain Rating 1: 0/10    Respiratory Status: Room air    Objective:     Oral Musculature Evaluation  Oral Musculature: WFL  Dentition: present and adequate  Secretion Management: adequate  Mucosal Quality: adequate  Mandibular Strength and Mobility: WNL  Oral Labial Strength and Mobility: impaired seal  Volitional Cough: reduced  Volitional Swallow: WFL  Voice Prior to PO Intake: WFL (wheezing noted througout assessment)    Bedside Swallow Eval:   Consistencies Assessed:  Thin liquids cup and straw, liquid medication, 3 oz  Mixed consistencies cup and hand, medication tablets w/ water and pudding, 2 oz      Oral Phase:   Decreased closure around utensil - difficulty with labial seal around straw. Better performance noted with cup rim.   However, if straw is used, daughter advised to place straw in the L corner of mouth 2' to R sided labial weakness    Pharyngeal Phase:   no overt clinical signs/symptoms of aspiration  no overt clinical signs/symptoms of pharyngeal dysphagia    Compensatory Strategies  Place straws in L labial corner OR utilize cup rim drinking     Treatment: Patient is to be monitored at this time. ST informed daughter that outpatient therapy could be an option if she and the patient were interested.     Goals:   Multidisciplinary Problems       SLP Goals          Problem: SLP    Goal Priority Disciplines Outcome   SLP Goal     SLP Progressing   Description: 1. Patients will maintain adequate hydration/nutrition with optimum safety and efficiency of swallowing function on P.O. intake without overt " signs and symptoms of aspiration for the highest appropriate diet level.    2. ST will monitor and address any communication barriers that may affect quality of care or patient safety.                          Plan:     Patient to be seen:  2 x/week   Plan of Care expires:  12/30/24  Plan of Care reviewed with:  patient, daughter   SLP Follow-Up:  Yes       Discharge recommendations:  Low Intensity Therapy   Barriers to Discharge:  None    Time Tracking:     SLP Treatment Date:   12/16/24  Speech Start Time:  0835  Speech Stop Time:  0900     Speech Total Time (min):  25 min    Billable Minutes: Eval 17 minutes  and Treatment Swallowing Dysfunction 8 minutes    12/16/2024

## 2024-12-16 NOTE — PT/OT/SLP EVAL
Physical Therapy Evaluation     Patient Name: Meaghan Pan   MRN: [id number]  Recent Surgery: * No surgery found *      Recommendations:     Discharge Recommendations: SNF upon discharge. Patient Independent with transfers prior to admit to hospital. Patient home alone during the day.   Discharge Equipment Recommendations: bed rail for hospital bed is discharge to home    Barriers to discharge:  Home alone during the day    Assessment:     Meaghan Pan is a 79 y.o. female admitted with a medical diagnosis of Encephalopathy due to influenza A virus. She presents with the following impairments/functional limitations: Decreased strength, decreased balance, impaired endurance resulting in increased Assist for all mobility.    Rehab Prognosis: Good; patient would benefit from acute PT services to address these deficits and reach maximum level of function.    Plan:     During this hospitalization, patient to be seen 5 x/week to address the above listed problems via therapeutic activities, therapeutic exercises, wheelchair management/training    Plan of Care Expires: at time of discharge from hosptial     Subjective     Chief Complaint: Patient requiring increased Assist for mobility. Unable to transfer without Assist.   Patient Comments/Goals: Return to prior level of functional mobility prior to hospital admit.   Pain/Comfort:  Pain Rating 1: 0/10    Social History:  Living Environment: Patient lives with their daughter in a single story home with  level entry  Prior Level of Function: Prior to admission, patient was modified independent at a w/c level  Equipment Used at Home: commode, wheelchair, hospital bed, other (see comments) (handicapped accessible shower and toilet, grab bars)  DME owned (not currently used): none  Assistance Upon Discharge: family    Objective:     Communicated with nursing prior to session. Patient found supine with blood pressure cuff, PureWick, telemetry (family present) upon PT entry to  room.    General Precautions: Standard, droplet   Orthopedic Precautions:  (History of (R) AKA)   Braces:      Respiratory Status: Room air    Exams:  Cognition: Patient is oriented to Person, Place, Time, Situation and expressive aphasia noted  RLE ROM:  Hip: WFL, knee/ankle: N/A history of AKA  RLE Strength:  4+/5  LLE ROM: WFL  LLE Strength:  4+/5    Functional Mobility:  Gait belt applied - No  Bed Mobility  Rolling Left: moderate assistance  Supine to sit: Moderate Assistance  Transfers  Sit to Stand: maximal assistance with no AD and with cues for hand placement  Bed to Chair: total assistance with no AD and with cues for hand placement using Squat Pivot and to right side  Gait  NT; patient with (R) AKA; non-ambulatory at baseline  Balance  Sitting: stand by assistance  Standing: maximal assistance  Wheelchair Propulsion: Pt propelled Standard wheelchair x 20 feet on Level tile with  Left upper extremity and Left lower extremity with Minimal Assistance.     Therapeutic Activities and Exercises:   Patient educated on role of acute care PT and PT POC and safety while in hospital including calling nurse for mobility  Patient educated about importance of OOB mobility and remaining up in chair most of the day.  Patient requested use of bathroom. Patient wheeled to bathroom. Patient transferred to/from commode Total A. Patient performed hygiene Total A.     AM-PAC 6 CLICK MOBILITY  Total Score:11    Patient left  sitting in w/c  with all lines intact, call button in reach, RN notified, and family present.    GOALS:   Multidisciplinary Problems       Physical Therapy Goals          Problem: Physical Therapy    Goal Priority Disciplines Outcome Interventions   Physical Therapy Goal     PT, PT/OT     Description:  The patient will meet the following goals by discharge:    1. Perform bed mobility with Supervision.  2. Perform functional squat-pivot transfer to/from bed/ w/c with Supervision.   3. Propel w/c over level  surfaces Supervision x 50 feet.                               History:     Past Medical History:   Diagnosis Date    Anxiety disorder, unspecified     Aphasia as late effect of cerebrovascular accident     COPD (chronic obstructive pulmonary disease)     GERD (gastroesophageal reflux disease)     Hyponatremia     Her sodium was low and Tegretol and HCTZ was discontinued. Her sodium has returned to normal.    Muscle spasm of right lower extremity     Spasticity: They feel Botox was more effective than Xeomin. This has been helpful with comfort and hygiene. Additional units for torticollis and in the pec helped with range of motion in neck and pain when raising left arm    Neuropathy     Pancreatic cyst     Seizure disorder     Spastic monoplegia of upper extremity     On right since the stroke    Stroke     old left MCA territory stroke with encephalomalacia    Unspecified glaucoma        Past Surgical History:   Procedure Laterality Date    HYSTERECTOMY      Right Above knee amputation         Time Tracking:     PT Received On: 12/16/24  PT Start Time: 1038  PT Stop Time: 1113  PT Total Time (min): 35 min     Billable Minutes: Evaluation 35    12/16/2024

## 2024-12-16 NOTE — PLAN OF CARE
Problem: Adult Inpatient Plan of Care  Goal: Plan of Care Review  Outcome: Progressing  Goal: Patient-Specific Goal (Individualized)  Outcome: Progressing  Goal: Absence of Hospital-Acquired Illness or Injury  Outcome: Progressing  Goal: Optimal Comfort and Wellbeing  Outcome: Progressing  Goal: Readiness for Transition of Care  Outcome: Progressing     Problem: Skin Injury Risk Increased  Goal: Skin Health and Integrity  Outcome: Progressing     Problem: Gas Exchange Impaired  Goal: Optimal Gas Exchange  Outcome: Progressing

## 2024-12-16 NOTE — ASSESSMENT & PLAN NOTE
In the ED her VS were BP (!) 228/118 -> 225/98 -> 175/75 -> 152/95   She has no history of HTN, so suspect this may be reactive to her uncontrolled spasms and UTI   -Her daughter states this has happened in the past  Check CT head without contrast stat to be sure nothing central  Her BP has come down nicely, so will follow and treat PRN for now, as she drops fast at times as well per daughter  Stat CXR and BNP case discussed with daughter at bedside who has concerns that she is spiking blood pressures at nighttime.  Vitals has been reviewed and she did spike up 195/124 to patient has been treated with labetalol.  We will review medications see if we can get a better control for nighttime blood pressure  But currently back in a normotensive state at 118/153.

## 2024-12-16 NOTE — ASSESSMENT & PLAN NOTE
Creatine stable for now. BMP reviewed- noted Estimated Creatinine Clearance: 53.2 mL/min (based on SCr of 0.7 mg/dL). according to latest data. Based on current GFR, CKD stage is stage 3 - GFR 30-59.  Monitor UOP and serial BMP and adjust therapy as needed. Renally dose meds. Avoid nephrotoxic medications and procedures.

## 2024-12-16 NOTE — CARE UPDATE
Per nurse, Pt was found to be wheezing this morning. Pt placed on PEP therapy and O2 2L NC per Respiratory.     -RT evaluated the patient as well.  Recommended - add aggressive pulmonary toilet and mobility-up to chair.   -Started her on pep therapy and assisted with cough   -She has history of COPD, so add Prednisone 40mg daily too    Current CXR:      CXR at admission:        Current Facility-Administered Medications:     acetaminophen suppository 650 mg, 650 mg, Rectal, Q6H PRN, GINO Byrd MD, 650 mg at 12/14/24 0109    acetaminophen tablet 650 mg, 650 mg, Per NG tube, Q6H PRN, GINO Byrd MD, 650 mg at 12/14/24 2323    albuterol-ipratropium 2.5 mg-0.5 mg/3 mL nebulizer solution 3 mL, 3 mL, Nebulization, Q4H PRN, GINO Byrd MD, 3 mL at 12/16/24 0418    albuterol-ipratropium 2.5 mg-0.5 mg/3 mL nebulizer solution 3 mL, 3 mL, Nebulization, Q8H, GINO Byrd MD, 3 mL at 12/15/24 2347    aluminum-magnesium hydroxide-simethicone 200-200-20 mg/5 mL suspension 30 mL, 30 mL, Per NG tube, QID PRN, GINO Byrd MD    aspirin tablet 325 mg, 325 mg, Per NG tube, Daily, GINO Byrd MD, 325 mg at 12/15/24 0850    baclofen tablet 5 mg, 5 mg, Oral, TID PRN, Sami Etienne DO, 5 mg at 12/14/24 1404    dextrose 50% injection 12.5 g, 12.5 g, Intravenous, PRN, GINO Byrd MD    dextrose 50% injection 25 g, 25 g, Intravenous, PRN, GINO Byrd MD    DULoxetine DR capsule 20 mg, 20 mg, Oral, QHS, GINO Byrd MD, 20 mg at 12/15/24 2023    electrolytes-dextrose (Pedialyte) oral solution 200 mL, 200 mL, Oral, Q4H, Sami Etienne DO    enoxaparin injection 40 mg, 40 mg, Subcutaneous, Daily, GINO Byrd MD, 40 mg at 12/15/24 1641    glucagon (human recombinant) injection 1 mg, 1 mg, Intramuscular, PRN, GINO Byrd MD    glucose chewable tablet 16 g, 16 g, Oral, PRN, GINO Byrd MD    glucose chewable tablet 24 g, 24 g, Oral, PRN, GINO Byrd MD     guaiFENesin 12 hr tablet 600 mg, 600 mg, Oral, BID, Sami Etienne, DO, 600 mg at 12/15/24 2023    hydrALAZINE injection 10 mg, 10 mg, Intravenous, Q6H PRN, Sami Etienne, DO, 10 mg at 12/14/24 2221    levETIRAcetam tablet 500 mg, 500 mg, Oral, BID, Sami Etienne, DO, 500 mg at 12/15/24 2023    levoFLOXacin 750 mg/150 mL IVPB 750 mg, 750 mg, Intravenous, Q48H, GINO Byrd MD, Stopped at 12/16/24 0312    LORazepam tablet 0.5 mg, 0.5 mg, Oral, Q6H PRN, Sami Etienne, DO, 0.5 mg at 12/15/24 2028    magnesium oxide tablet 800 mg, 800 mg, Per NG tube, PRN, GINO Byrd MD    magnesium oxide tablet 800 mg, 800 mg, Per NG tube, PRN, GINO Byrd MD    melatonin tablet 6 mg, 6 mg, Oral, Nightly PRN, GINO Byrd MD    morphine injection 2 mg, 2 mg, Intravenous, Q4H PRN, Sami Etienne DO, 2 mg at 12/14/24 2049    naloxone 0.4 mg/mL injection 0.02 mg, 0.02 mg, Intravenous, PRN, GINO Byrd MD    ondansetron injection 4 mg, 4 mg, Intravenous, Q8H PRN, GINO Byrd MD    oseltamivir capsule 75 mg, 75 mg, Per NG tube, Once, GINO Byrd MD    potassium bicarbonate disintegrating tablet 35 mEq, 35 mEq, Per NG tube, PRN, GINO Byrd MD    potassium bicarbonate disintegrating tablet 50 mEq, 50 mEq, Per NG tube, PRN, GINO Byrd MD    potassium bicarbonate disintegrating tablet 60 mEq, 60 mEq, Per NG tube, PRN, GINO Byrd MD    potassium, sodium phosphates 280-160-250 mg packet 2 packet, 2 packet, Per NG tube, PRN, GINO Byrd MD    potassium, sodium phosphates 280-160-250 mg packet 2 packet, 2 packet, Per NG tube, PRN, GINO Byrd MD    potassium, sodium phosphates 280-160-250 mg packet 2 packet, 2 packet, Per NG tube, PRN, GINO Byrd MD    prochlorperazine injection Soln 5 mg, 5 mg, Intravenous, Q6H PRN, GINO Byrd MD    senna-docusate 8.6-50 mg per tablet 1 tablet, 1 tablet, Per NG tube, BID PRN, GION Byrd  MD Daron, 1 tablet at 12/14/24 2049    simethicone chewable tablet 80 mg, 1 tablet, Per NG tube, QID PRN, GINO Byrd MD, 80 mg at 12/14/24 2323    sodium chloride 0.9% flush 10 mL, 10 mL, Intravenous, Q12H PRN, GINO Byrd MD

## 2024-12-16 NOTE — PROGRESS NOTES
Pharmacist Renal Dose Adjustment Note    Meaghan Pan is a 79 y.o. female being treated with the medication Levaquin    Patient Data:    Vital Signs (Most Recent):  Temp: 98.5 °F (36.9 °C) (12/16/24 0730)  Pulse: 91 (12/16/24 0731)  Resp: 18 (12/16/24 0731)  BP: 134/61 (12/16/24 0730)  SpO2: 98 % (12/16/24 0731) Vital Signs (72h Range):  Temp:  [97.4 °F (36.3 °C)-102.7 °F (39.3 °C)]   Pulse:  []   Resp:  [17-36]   BP: (115-228)/()   SpO2:  [91 %-100 %]      Recent Labs   Lab 12/14/24  0435 12/15/24  1237 12/16/24  0335   CREATININE 0.8 0.8 0.7     Serum creatinine: 0.7 mg/dL 12/16/24 0335  Estimated creatinine clearance: 53.2 mL/min    Medication:Levaquin dose: 750mg frequency q48h will be changed to medication:Levquin dose:750mg frequency:q24h    Pharmacist's Name: Dino Rosenbaum  Pharmacist's Extension: [id number]

## 2024-12-16 NOTE — PROGRESS NOTES
"[facility] Medicine  Progress Note    Patient Name: Meaghan Pan  MRN: [id number]  Patient Class: IP- Inpatient   Admission Date: 12/13/2024  Length of Stay: 2 days  Attending Physician: Rachid Santana MD  Primary Care Provider: Estee Fierro MD        Subjective     Principal Problem:Encephalopathy due to influenza A virus        HPI:  Ms. 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    She lives with her daughter, who notes that she suffered a stroke at age 41.  Apparently she was on hormone therapy and smoked which is suspected source of stroke. She suffered aphasia and right side hemiparesis following her stroke years ago. 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  She is followed at [facility], last seeing them in clinic on 12/4/24 for Botox therapy and adjustment of seizure medications.  At that visit her Keppra was increased to 500 mg qam, 125mg at 3pm, and 625 mg nightly.    She was seen in the ED x 2 on 12/12/24, the first time for UTI and muscle spasms treated with Klonopin and Macrobid.  She later returned on the same day with severe worsening of her muscle spasms, treated with Seroquel 25mg and Valium 5mg.  Her daughter has been giving her the Klonopin from the ED, but had to stop due to profound fatigue and lethargy.  She has seen mild improvement in her spasms today, but the real reason she brought her back was due to elevated BP at home.     Ms. Pan is a bit lethargic and unable to speak on exam.  Her daughter found her cheeks flushed, which is common when her BP shoots up (has had this happen in the past).  She checked it at home and found it to be 190/105, which frightened her and made her come to the ED.  She has seen " "no N/V/D, fever, chills, but does not hot flashes.      In the ED her VS were BP (!) 228/118 -> 225/98 -> 175/75 -> 152/95   Pulse 98 -> 85   Temp 98.5 °F (36.9 °C) (Axillary)   Resp (!) 24   Ht 5' 2" (1.575 m)   Wt 55 kg (121 lb 4.1 oz)   SpO2 95% RA  BMI 22.18 kg/m².  Labs showed NML CBC, Cr 0.8, Gluc 67 -> 120, Alb 3.4, Trop 0.233, COVID/FLU NEG, UA: nitrite +, LE 1+, WBC 15, many bacteria.    No radiographs done in the ED.  EKG showed NSR rate 90, NS ST-T changes, QTc 479 ms.    In the ED she was treated with:  Medications   labetalol 20 mg/4 mL (5 mg/mL) IV syring (5 mg Intravenous Given 12/13/24 2002)   dextrose 5 % and 0.45 % NaCl infusion (0 mLs Intravenous Stopped 12/13/24 2216)   labetalol 20 mg/4 mL (5 mg/mL) IV syring (10 mg Intravenous Given 12/13/24 2151)   LORazepam (ATIVAN) injection 0.5 mg (0.5 mg Intravenous Given 12/13/24 2208)   aspirin tablet 325 mg (325 mg Oral Given 12/13/24 2305)               Overview/Hospital Course:  79-year-old female who is cared at home by her daughter.  Has a history of CVA left MCA tear territory with difficulties with speech.  And right-sided paresis.  Her other medical problems including COPD glaucoma pancreatic cyst right AKA.  Secondary to blood clots and gangrene and seizure disorder patient is clinically doing much better than on arrival.  She is still having some spikes of her blood pressure that the daughter his concerned about at night currently she is under good control.  Labs are pending.  Issues from labs yesterday was a mild hyponatremia at 1:33 a.m. a metabolic acidosis with a bicarb at 19 CPK at 7:00 p.m. and some mild elevation of troponins.  We will continue to trend troponins.  And check CK-MBs.  CTA of the brain obtained on admission showed no acute abnormalities chest x-ray no acute abnormalities the patient is positive for influenza A it has been treated with Tamiflu.  Patient was able to speak words today in no distress repeat labs in the " morning given a mild elevation of troponins echocardiogram will be ordered.  And repeat cardiac markers    Interval History: Agitated overnight. Received IV ativan. Will discontinue this to avoid paradoxical agitation given advanced age and likely underlying dementia. Per daughter patient is not on this medication at home. Also discontinuing morphine and baclofen to avoid oversedation. Per daughter patient very rarely takes this medication at home. PRN zyprexa ordered if patient becomes agitated again. Explained to daughter that if we have to give any medications for agitation there is associated risk but I do feel that zyprexa is a safer choice overall. No hx of parkinsons disease. Per daughter patient is closer to her baseline at this time. No documented hx of COPD but was a former smoker. Having BM's.     Review of Systems   All other systems reviewed and are negative.    Objective:     Vital Signs (Most Recent):  Temp: 98.5 °F (36.9 °C) (12/16/24 0730)  Pulse: 91 (12/16/24 0731)  Resp: 18 (12/16/24 0731)  BP: 134/61 (12/16/24 0730)  SpO2: 98 % (12/16/24 0731) Vital Signs (24h Range):  Temp:  [97.4 °F (36.3 °C)-98.7 °F (37.1 °C)] 98.5 °F (36.9 °C)  Pulse:  [79-96] 91  Resp:  [17-26] 18  SpO2:  [91 %-100 %] 98 %  BP: (115-163)/(56-72) 134/61     Weight: 54.5 kg (120 lb 2.4 oz)  Body mass index is 21.98 kg/m².    Intake/Output Summary (Last 24 hours) at 12/16/2024 1035  Last data filed at 12/16/2024 0347  Gross per 24 hour   Intake 150.31 ml   Output 500 ml   Net -349.69 ml         Physical Exam      Vitals reviewed  General: NAD, Well developed, Well Nourished  Head: NC/AT  Eyes: EOMI, SHANNON  Cardiovascular: Pulses intact distally, Regular Rate and rhythm  Pulmonary: Normal Respiratory Rate, No respiratory distress  Gi: Soft, Non-tender  Extremities: Warm, No edema present LLE. Right AKA  Skin: Warm, dry  Neuro: alert, aphasic, chronic weakness right side  Psych: Appropriate mood and affect      Significant Labs:  All pertinent labs within the past 24 hours have been reviewed.    Significant Imaging: I have reviewed all pertinent imaging results/findings within the past 24 hours.    Assessment and Plan     * Encephalopathy due to influenza A virus  This has all been over the past 24 hours leading up to admission, mostly after all of the benzo's  She was given Klonopin from the ED, which her daughter states caused severe lethargy  She also got IV Ativan in the ED (only 0.5mg), but she is unable to interact with me much due to sedation  Patient neurologically is doing much better awake alert talking encephalopathy has resolved this is likely metabolic encephalopathy.  And continues to improve.    CKD stage 3a, GFR 45-59 ml/min  Creatine stable for now. BMP reviewed- noted Estimated Creatinine Clearance: 53.2 mL/min (based on SCr of 0.7 mg/dL). according to latest data. Based on current GFR, CKD stage is stage 3 - GFR 30-59.  Monitor UOP and serial BMP and adjust therapy as needed. Renally dose meds. Avoid nephrotoxic medications and procedures.    Influenza  Treat with tamiflu renally dosed due to CKD 3a      Seizure disorder as sequela of cerebrovascular accident  Seizure precautions, neuro checks q4    Continue home medications:    levETIRAcetam 100 mg/mL solution 650 mg, 650 mg, Oral, QHS     levetiracetam 500 mg/5 mL (5 mL) liquid Soln 130 mg, 130 mg, Oral, After lunch, 3pm    levETIRAcetam tablet 500 mg, 500 mg, Oral, am    Hypoglycemia  This improved rapidly with therapy  Gluc 67 -> 120      COPD (chronic obstructive pulmonary disease)  She has been admitted to the hospital for COPD exacerbation in the past on chart review  She seems to be a bit SOB on exam, so check ABG  Duonebs Q4 prn and Q6 hours  Continue steroids, tamiflu, abx      Elevated troponin level not due to acute coronary syndrome  I suspect this is type II ischemia due to elevated BP  So far this is flat  Continue daily ASA 325mg po - dose given in  ED  [facility]  Ordered TTE           Acute cystitis without hematuria  Per Neurology notes, she has anaphylaxis against PCN  Treating with IV Levaquin for now with care to history of seizures  Stop Macrodantin  Follow up CXS  Her blood cultures have remained negative she is positive for influenza a which she is on Tamiflu.  Urine cultures have shown E coli greater than 100,000. On Levaquin awaiting final sensitivities    Malignant essential hypertension  In the ED her VS were BP (!) 228/118 -> 225/98 -> 175/75 -> 152/95   She has no history of HTN, so suspect this may be reactive to her uncontrolled spasms and UTI   -Her daughter states this has happened in the past  Check CT head without contrast stat to be sure nothing central  Her BP has come down nicely, so will follow and treat PRN for now, as she drops fast at times as well per daughter  Stat CXR and BNP case discussed with daughter at bedside who has concerns that she is spiking blood pressures at nighttime.  Vitals has been reviewed and she did spike up 195/124 to patient has been treated with labetalol.  We will review medications see if we can get a better control for nighttime blood pressure  But currently back in a normotensive state at 118/153.        Muscle spasms of lower extremity  She is followed at [facility], last seeing them in clinic on 12/4/24 for Botox therapy and adjustment of seizure medications.  These have worsened over the past few days since UTI      Hx of AKA (above knee amputation), right  She lives with her daughter, who notes that she suffered a stroke at age 41.  Apparently she was on hormone therapy and smoked which is suspected source of stroke. She suffered aphasia and right side hemiparesis following her stroke years ago. 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  All of this occurred around the same time as well.  She has been on ASA 325mg daily since that time.      Anxiety with  depression  Continue home meds:    DULoxetine DR capsule 20 mg, 20 mg, Oral, QHS         Aphasia as late effect of cerebrovascular accident  Nursing bedside swallow evaluation and SLP evaluation in am  This is a chronic issue since her stroke many years ago  Patient has been cleared from a swallowing study and advancing diet      H/O: CVA (cerebrovascular accident)  CVA of left MCA territory with aphasia and spasticity of right side.  She lives with her daughter, who notes that she suffered a stroke at age 41.  Apparently she was on hormone therapy and smoked which is suspected source of stroke. She suffered aphasia and right side hemiparesis following her stroke years ago.  She has been on ASA 325mg daily since that time.  CT of the head on presentation no acute abnormalities        VTE Risk Mitigation (From admission, onward)           Ordered     enoxaparin injection 40 mg  Daily         12/14/24 0007     Place ANGELO hose  Until discontinued         12/14/24 0007     IP VTE HIGH RISK PATIENT  Once         12/14/24 0007     Place sequential compression device  Until discontinued         12/14/24 0007                    Discharge Planning   HERIBERTO: 12/17/2024     Code Status: Full Code   Medical Readiness for Discharge Date:                    Please place Justification for DME        Rachid Santana MD  Department of Hospital Medicine   [facility]

## 2024-12-16 NOTE — PLAN OF CARE
"Inpatient Upgrade Note    Meaghan Pan has warranted treatment spanning two or more midnights of hospital level care for the management of  Encephalopathy due to influenza A virus and Acute cystitis without hematuria . She continues to require IV antibiotics, daily labs, supplemental oxygen, further testing/imaging, monitoring of vital signs, and medication adjustments. 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 .   "

## 2024-12-16 NOTE — PLAN OF CARE
Problem: Adult Inpatient Plan of Care  Goal: Plan of Care Review  Outcome: Progressing  Goal: Patient-Specific Goal (Individualized)  Outcome: Progressing  Goal: Absence of Hospital-Acquired Illness or Injury  Outcome: Progressing  Goal: Optimal Comfort and Wellbeing  Outcome: Progressing  Goal: Readiness for Transition of Care  Outcome: Progressing     Problem: Infection  Goal: Absence of Infection Signs and Symptoms  Outcome: Progressing     Problem: Skin Injury Risk Increased  Goal: Skin Health and Integrity  Outcome: Progressing     Problem: Gas Exchange Impaired  Goal: Optimal Gas Exchange  Outcome: Progressing

## 2024-12-17 VITALS
HEART RATE: 101 BPM | TEMPERATURE: 98 F | HEIGHT: 62 IN | SYSTOLIC BLOOD PRESSURE: 148 MMHG | DIASTOLIC BLOOD PRESSURE: 70 MMHG | WEIGHT: 126.56 LBS | OXYGEN SATURATION: 96 % | RESPIRATION RATE: 23 BRPM | BODY MASS INDEX: 23.29 KG/M2

## 2024-12-17 LAB
ALBUMIN SERPL BCP-MCNC: 2.5 G/DL (ref 3.5–5.2)
ALP SERPL-CCNC: 55 U/L (ref 40–150)
ALT SERPL W/O P-5'-P-CCNC: 22 U/L (ref 10–44)
ANION GAP SERPL CALC-SCNC: 7 MMOL/L (ref 8–16)
AST SERPL-CCNC: 41 U/L (ref 10–40)
BASOPHILS # BLD AUTO: ABNORMAL K/UL (ref 0–0.2)
BASOPHILS NFR BLD: 0 % (ref 0–1.9)
BILIRUB SERPL-MCNC: 0.2 MG/DL (ref 0.1–1)
BUN SERPL-MCNC: 9 MG/DL (ref 8–23)
CALCIUM SERPL-MCNC: 8.2 MG/DL (ref 8.7–10.5)
CHLORIDE SERPL-SCNC: 109 MMOL/L (ref 95–110)
CO2 SERPL-SCNC: 24 MMOL/L (ref 23–29)
CREAT SERPL-MCNC: 0.7 MG/DL (ref 0.5–1.4)
DIFFERENTIAL METHOD BLD: ABNORMAL
EOSINOPHIL # BLD AUTO: ABNORMAL K/UL (ref 0–0.5)
EOSINOPHIL NFR BLD: 0 % (ref 0–8)
ERYTHROCYTE [DISTWIDTH] IN BLOOD BY AUTOMATED COUNT: 15.8 % (ref 11.5–14.5)
EST. GFR  (NO RACE VARIABLE): >60 ML/MIN/1.73 M^2
GLUCOSE SERPL-MCNC: 96 MG/DL (ref 70–110)
HCT VFR BLD AUTO: 34 % (ref 37–48.5)
HGB BLD-MCNC: 10.9 G/DL (ref 12–16)
IMM GRANULOCYTES # BLD AUTO: ABNORMAL K/UL (ref 0–0.04)
IMM GRANULOCYTES NFR BLD AUTO: ABNORMAL % (ref 0–0.5)
LYMPHOCYTES # BLD AUTO: ABNORMAL K/UL (ref 1–4.8)
LYMPHOCYTES NFR BLD: 21 % (ref 18–48)
MAGNESIUM SERPL-MCNC: 2 MG/DL (ref 1.6–2.6)
MCH RBC QN AUTO: 27.8 PG (ref 27–31)
MCHC RBC AUTO-ENTMCNC: 32.1 G/DL (ref 32–36)
MCV RBC AUTO: 87 FL (ref 82–98)
MONOCYTES # BLD AUTO: ABNORMAL K/UL (ref 0.3–1)
MONOCYTES NFR BLD: 21 % (ref 4–15)
NEUTROPHILS NFR BLD: 50 % (ref 38–73)
NEUTS BAND NFR BLD MANUAL: 8 %
NRBC BLD-RTO: 0 /100 WBC
PHOSPHATE SERPL-MCNC: 1.9 MG/DL (ref 2.7–4.5)
PLATELET # BLD AUTO: 205 K/UL (ref 150–450)
PMV BLD AUTO: 10 FL (ref 9.2–12.9)
POTASSIUM SERPL-SCNC: 3.9 MMOL/L (ref 3.5–5.1)
PROT SERPL-MCNC: 5.8 G/DL (ref 6–8.4)
RBC # BLD AUTO: 3.92 M/UL (ref 4–5.4)
SODIUM SERPL-SCNC: 140 MMOL/L (ref 136–145)
WBC # BLD AUTO: 4.23 K/UL (ref 3.9–12.7)

## 2024-12-17 PROCEDURE — 99238 HOSP IP/OBS DSCHRG MGMT 30/<: CPT | Mod: ,,, | Performed by: STUDENT IN AN ORGANIZED HEALTH CARE EDUCATION/TRAINING PROGRAM

## 2024-12-17 PROCEDURE — 25000003 PHARM REV CODE 250: Performed by: STUDENT IN AN ORGANIZED HEALTH CARE EDUCATION/TRAINING PROGRAM

## 2024-12-17 PROCEDURE — 92526 ORAL FUNCTION THERAPY: CPT

## 2024-12-17 PROCEDURE — 80053 COMPREHEN METABOLIC PANEL: CPT | Performed by: STUDENT IN AN ORGANIZED HEALTH CARE EDUCATION/TRAINING PROGRAM

## 2024-12-17 PROCEDURE — 85007 BL SMEAR W/DIFF WBC COUNT: CPT | Performed by: STUDENT IN AN ORGANIZED HEALTH CARE EDUCATION/TRAINING PROGRAM

## 2024-12-17 PROCEDURE — 25000242 PHARM REV CODE 250 ALT 637 W/ HCPCS: Performed by: INTERNAL MEDICINE

## 2024-12-17 PROCEDURE — 63600175 PHARM REV CODE 636 W HCPCS: Performed by: INTERNAL MEDICINE

## 2024-12-17 PROCEDURE — 94640 AIRWAY INHALATION TREATMENT: CPT

## 2024-12-17 PROCEDURE — 94761 N-INVAS EAR/PLS OXIMETRY MLT: CPT

## 2024-12-17 PROCEDURE — 30200315 PPD INTRADERMAL TEST REV CODE 302: Performed by: STUDENT IN AN ORGANIZED HEALTH CARE EDUCATION/TRAINING PROGRAM

## 2024-12-17 PROCEDURE — 25000003 PHARM REV CODE 250: Performed by: INTERNAL MEDICINE

## 2024-12-17 PROCEDURE — 83735 ASSAY OF MAGNESIUM: CPT | Performed by: STUDENT IN AN ORGANIZED HEALTH CARE EDUCATION/TRAINING PROGRAM

## 2024-12-17 PROCEDURE — 86580 TB INTRADERMAL TEST: CPT | Performed by: STUDENT IN AN ORGANIZED HEALTH CARE EDUCATION/TRAINING PROGRAM

## 2024-12-17 PROCEDURE — 63600175 PHARM REV CODE 636 W HCPCS: Performed by: STUDENT IN AN ORGANIZED HEALTH CARE EDUCATION/TRAINING PROGRAM

## 2024-12-17 PROCEDURE — 85027 COMPLETE CBC AUTOMATED: CPT | Performed by: STUDENT IN AN ORGANIZED HEALTH CARE EDUCATION/TRAINING PROGRAM

## 2024-12-17 PROCEDURE — 84100 ASSAY OF PHOSPHORUS: CPT | Performed by: STUDENT IN AN ORGANIZED HEALTH CARE EDUCATION/TRAINING PROGRAM

## 2024-12-17 RX ORDER — LEVOFLOXACIN 750 MG/1
750 TABLET ORAL DAILY
Qty: 1 TABLET | Refills: 0 | Status: SHIPPED | OUTPATIENT
Start: 2024-12-18 | End: 2024-12-19

## 2024-12-17 RX ORDER — PREDNISONE 20 MG/1
40 TABLET ORAL DAILY
Qty: 6 TABLET | Refills: 0 | Status: SHIPPED | OUTPATIENT
Start: 2024-12-18 | End: 2024-12-21

## 2024-12-17 RX ORDER — AMLODIPINE BESYLATE 5 MG/1
5 TABLET ORAL DAILY
Qty: 90 TABLET | Refills: 3 | Status: SHIPPED | OUTPATIENT
Start: 2024-12-17 | End: 2025-12-17

## 2024-12-17 RX ORDER — AMLODIPINE BESYLATE 5 MG/1
5 TABLET ORAL DAILY
Status: DISCONTINUED | OUTPATIENT
Start: 2024-12-17 | End: 2024-12-17 | Stop reason: HOSPADM

## 2024-12-17 RX ORDER — OSELTAMIVIR PHOSPHATE 30 MG/1
30 CAPSULE ORAL 2 TIMES DAILY
Qty: 9 CAPSULE | Refills: 0 | Status: SHIPPED | OUTPATIENT
Start: 2024-12-17 | End: 2024-12-22

## 2024-12-17 RX ORDER — HYDRALAZINE HYDROCHLORIDE 20 MG/ML
10 INJECTION INTRAMUSCULAR; INTRAVENOUS EVERY 6 HOURS PRN
Status: DISCONTINUED | OUTPATIENT
Start: 2024-12-17 | End: 2024-12-17 | Stop reason: HOSPADM

## 2024-12-17 RX ADMIN — TUBERCULIN PURIFIED PROTEIN DERIVATIVE 5 UNITS: 5 INJECTION, SOLUTION INTRADERMAL at 10:12

## 2024-12-17 RX ADMIN — Medication 200 ML: at 11:12

## 2024-12-17 RX ADMIN — LEVETIRACETAM 500 MG: 250 TABLET, FILM COATED ORAL at 08:12

## 2024-12-17 RX ADMIN — OSELTAMAVIR PHOSPHATE 30 MG: 30 CAPSULE ORAL at 08:12

## 2024-12-17 RX ADMIN — PREDNISONE 40 MG: 20 TABLET ORAL at 08:12

## 2024-12-17 RX ADMIN — Medication 200 ML: at 07:12

## 2024-12-17 RX ADMIN — GUAIFENESIN 600 MG: 600 TABLET ORAL at 08:12

## 2024-12-17 RX ADMIN — AMLODIPINE BESYLATE 5 MG: 5 TABLET ORAL at 12:12

## 2024-12-17 RX ADMIN — IPRATROPIUM BROMIDE AND ALBUTEROL SULFATE 3 ML: .5; 2.5 SOLUTION RESPIRATORY (INHALATION) at 07:12

## 2024-12-17 RX ADMIN — Medication 200 ML: at 04:12

## 2024-12-17 RX ADMIN — ASPIRIN 325 MG ORAL TABLET 325 MG: 325 PILL ORAL at 08:12

## 2024-12-17 RX ADMIN — LEVOFLOXACIN 750 MG: 750 INJECTION, SOLUTION INTRAVENOUS at 04:12

## 2024-12-17 NOTE — PLAN OF CARE
Problem: Adult Inpatient Plan of Care  Goal: Plan of Care Review  Outcome: Progressing  Goal: Patient-Specific Goal (Individualized)  Outcome: Progressing  Goal: Absence of Hospital-Acquired Illness or Injury  Outcome: Progressing  Goal: Optimal Comfort and Wellbeing  Outcome: Progressing  Goal: Readiness for Transition of Care  Outcome: Progressing     Problem: Infection  Goal: Absence of Infection Signs and Symptoms  Outcome: Progressing     Problem: Skin Injury Risk Increased  Goal: Skin Health and Integrity  Outcome: Progressing     Problem: Gas Exchange Impaired  Goal: Optimal Gas Exchange  Outcome: Progressing   POC reviewed at bedside. Questions and concerns addressed. VSS. Placed bed in low and locked position. Call light within reach. Side rails up x2. Instructed to call for any needs. Verbalized understanding of all instructions. Frequent rounds.

## 2024-12-17 NOTE — PLAN OF CARE
Patient is going to [facility]. Patients daughter wants to bring her to the Center. SW spoke with Nurse she called report over to [place] and patient is medically cleared to discharge today.Nurse Ji was provided with copies of Mar,Discharge orders, Summary, PPD , placement and notes.No  further case management needs.   12/17/24 1250   Final Note   Assessment Type Final Discharge Note   Anticipated Discharge Disposition SNF   Post-Acute Status   Post-Acute Authorization Placement   Post-Acute Placement Status Set-up Complete/Auth obtained   Patient choice form signed by patient/caregiver List from CMS Compare;List with quality metrics by geographic area provided;List from System Post-Acute Care   Discharge Delays None known at this time

## 2024-12-17 NOTE — PLAN OF CARE
sent referral via Epic to [facility] as patients daughter asked if we could send her preferences for [facility] first then second for [facility].

## 2024-12-17 NOTE — PLAN OF CARE
[facility]    FACILITY TRANSFER ORDERS      Patient Name: Meaghan aPn  YOB: 1945    PCP: Estee Fierro MD   PCP Address: [address] [place] [address] [place] [address]  PCP Phone Number: [phone]  PCP Fax: [phone]    Encounter Date: 12/17/2024    Admit to: [facility]    Vital Signs:  Routine    Diagnoses:   Active Hospital Problems    Diagnosis  POA    *Encephalopathy due to influenza A virus [J09.X9]  Yes    Influenza [J11.1]  Yes    CKD stage 3a, GFR 45-59 ml/min [N18.31]  Yes    Elevated troponin level not due to acute coronary syndrome [R79.89]  Yes    COPD (chronic obstructive pulmonary disease) [J44.9]  Yes    Hypoglycemia [E16.2]  Yes    Seizure disorder as sequela of cerebrovascular accident [I69.398, G40.909]  Not Applicable    Muscle spasms of lower extremity [M62.838]  Yes    Malignant essential hypertension [I10]  Yes    Acute cystitis without hematuria [N30.00]  Yes    Hx of AKA (above knee amputation), right [Z89.611]  Not Applicable    Anxiety with depression [F41.8]  Yes    Aphasia as late effect of cerebrovascular accident [I69.320]  Not Applicable    H/O: CVA (cerebrovascular accident) [Z86.73]  Not Applicable     Last Assessment & Plan:   Formatting of this note might be different from the original.  Continue her aspirin.        Resolved Hospital Problems    Diagnosis Date Resolved POA    Encephalopathy due to influenza A virus [J09.X9] 12/14/2024 Unknown       Allergies:  Review of patient's allergies indicates:   Allergen Reactions    Penicillins Anaphylaxis     Unknown       Diet: Diet recommendations:  Regular Texture - IDDSI Level 7, Liquid Diet Level: Thin   Aspiration Precautions: 1 bite/sip at a time, Alternating bites/sips, Assistance with meals, Eliminate distractions, Feed only when awake/alert, Frequent oral care, HOB to 90 degrees, and Meds whole 1 at a time   General Precautions: Standard,    Communication strategies:  yes/no questions,  provide increased time to answer    Activities: Activity as tolerated    Goals of Care Treatment Preferences:  Code Status: Full Code      Nursing: Per unit protocol     Labs:  none          CONSULTS:    Physical Therapy to evaluate and treat. , Occupational Therapy to evaluate and treat., Speech Therapy to evaluate and treat for Language, Swallowing, and Cognition., and  to evaluate for community resources/long-range planning.    MISCELLANEOUS CARE:  Routine Skin for Bedridden Patients: Apply moisture barrier cream to all skin folds and wet areas in perineal area daily and after baths and all bowel movements.    WOUND CARE ORDERS  None    Medications: Review discharge medications with patient and family and provide education.         Medication List        START taking these medications      amLODIPine 5 MG tablet  Commonly known as: NORVASC  Take 1 tablet (5 mg total) by mouth once daily.     levoFLOXacin 750 MG tablet  Commonly known as: LEVAQUIN  Take 1 tablet (750 mg total) by mouth once daily. for 1 day  Start taking on: December 18, 2024     oseltamivir 30 MG capsule  Commonly known as: TAMIFLU  Take 1 capsule (30 mg total) by mouth 2 (two) times daily. for 9 doses     predniSONE 20 MG tablet  Commonly known as: DELTASONE  Take 2 tablets (40 mg total) by mouth once daily. for 3 days  Start taking on: December 18, 2024            CONTINUE taking these medications      albuterol 2.5 mg /3 mL (0.083 %) nebulizer solution  Commonly known as: PROVENTIL  Take 3 mLs (2.5 mg total) by nebulization every 6 (six) hours as needed for Wheezing. Rescue     aspirin 325 MG EC tablet  Commonly known as: ECOTRIN  Take 325 mg by mouth once daily.     baclofen 5 mg Tab tablet  Commonly known as: LIORESAL  Take 5 mg by mouth 3 (three) times daily as needed (Muscle spasms).     DULoxetine 20 MG capsule  Commonly known as: CYMBALTA  TAKE 1 CAPSULE BY MOUTH EVERY DAY AT NIGHT     fluticasone propionate 50 mcg/actuation  nasal spray  Commonly known as: FLONASE  USE 1 SPRAY (50 MCG TOTAL) IN EACH NOSTRIL ONCE DAILY     guaiFENesin 600 mg 12 hr tablet  Commonly known as: MUCINEX  Take 1,200 mg by mouth 3 (three) times daily.     Lactobacillus acidophilus 500 million cell Cap  Take 1 capsule by mouth once daily.     * levETIRAcetam 500 MG Tab  Commonly known as: KEPPRA  Take by mouth 2 (two) times daily.     * levETIRAcetam 250 MG Tab  Commonly known as: KEPPRA  Take 0.5 tablets by mouth 2 (two) times a day. Once in the morning with 500mg dose Once at midday     omeprazole 40 MG capsule  Commonly known as: PRILOSEC  Take 40 mg by mouth every morning.           * This list has 2 medication(s) that are the same as other medications prescribed for you. Read the directions carefully, and ask your doctor or other care provider to review them with you.                STOP taking these medications      clonazePAM 0.5 MG tablet  Commonly known as: KlonoPIN     nitrofurantoin (macrocrystal-monohydrate) 100 MG capsule  Commonly known as: MACROBID            Keppra Dosin mg in the morning, 125mg in afternoon, then at night 625mg       Immunizations Administered as of 2024       Name Date Dose VIS Date Route Exp Date    COVID-19, MRNA, LN-S PF (Moderna) 2021 0.5 mL -- -- --    Lot: [id number]     External: Auto Reconciled From Outside Source     COVID-19, MRNA, LN-S, PF (Moderna) 3/3/2021 0.5 mL -- -- --    Lot: [id number]     External: Auto Reconciled From Outside Source                 _________________________________  Rachid Santana MD  2024

## 2024-12-17 NOTE — PT/OT/SLP PROGRESS
Speech Language Pathology Treatment    Patient Name:  Meaghan Pan   MRN:  [id number]  Admitting Diagnosis: Encephalopathy due to influenza A virus    Recommendations:     Diet recommendations:  Regular Texture - IDDSI Level 7, Liquid Diet Level: Thin   Aspiration Precautions: 1 bite/sip at a time, Alternating bites/sips, Assistance with meals, Eliminate distractions, Feed only when awake/alert, Frequent oral care, HOB to 90 degrees, and Meds whole 1 at a time   General Precautions: Standard,    Communication strategies:  yes/no questions, provide increased time to answer    Assessment:     Meaghan Pan is a 79 y.o. female who was admitted for encephalopathy due to influenza A virus. Meaghan has experienced intermittent dysphagia and has lived as a person with aphasia since the age of 41 2' to CVA of left MCA territory. Patient's daughter reported that patient has tolerated current diet recommendations well. She stated that patient still is refusing foods that look unfamiliar (chopped meat, etc.). Daughter conveyed that patient has eaten red beans and rice, and hamburger with no s/s of aspiration. No concerns were noted from RN. During treatment, patient took medication and drank thin liquid by both straw and cup rim. No overt s/s of aspiration noted. No concerns regarding communication reported by RN, daughter, or patient. Patient is appropriate for upgrade to regular texture diet with thin liquids. Patient is also eligible for discharge from speech therapy at this time. Patient is safe to consume a regular textured diet (with precautions in place - listed above). There are no communication barriers evident that will negatively impact patient care.     Re-consult ST if any changes or concern arise.     Subjective   Patient was pleasant and cooperative throughout treatment. Daughter at bedside.     Pain/Comfort:   Patient did not exhibits any overt s/s of pain or discomfort during treatment.     Respiratory  Status: Room air    Objective:     Has the patient been evaluated by SLP for swallowing?    Yes  Keep patient NPO?   No  Current Respiratory Status:    Room Air    Goals:   Multidisciplinary Problems       SLP Goals       Not on file              Multidisciplinary Problems (Resolved)          Problem: SLP    Goal Priority Disciplines Outcome   SLP Goal   (Resolved)     SLP Met   Description: 1. Patients will maintain adequate hydration/nutrition with optimum safety and efficiency of swallowing function on P.O. intake without overt signs and symptoms of aspiration for the highest appropriate diet level.    2. ST will monitor and address any communication barriers that may affect quality of care or patient safety.                          Plan:     Patient is eligible for discharge from speech therapy at this time  Re consult if any changes or concerns arise  Plan of Care reviewed with:  patient, daughter, RN   SLP Follow-Up:  No      Barriers to Discharge:  None    Time Tracking:     SLP Treatment Date:   12/17/2024  Speech Start Time:  0836  Speech Stop Time:  0858     Speech Total Time (min):  22 min    Billable Minutes: Treatment Swallowing Dysfunction 22 minutes    12/17/2024

## 2024-12-17 NOTE — DISCHARGE SUMMARY
"[facility]  Discharge Summary      Patient Name: Meaghan Pan  MRN: [id number]  MANOHAR: [id number]  Patient Class: IP- Inpatient  Admission Date: 12/13/2024  Hospital Length of Stay: 3 days  Discharge Date and Time:  12/17/2024 11:49 AM  Attending Physician: Rachid Santana MD   Discharging Provider: Rachid Santana MD  Primary Care Provider: sEtee Fierro MD    Primary Care Team: Networked reference to record PCT     HPI:   Ms. 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    She lives with her daughter, who notes that she suffered a stroke at age 41.  Apparently she was on hormone therapy and smoked which is suspected source of stroke. She suffered aphasia and right side hemiparesis following her stroke years ago. 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  She is followed at [facility], last seeing them in clinic on 12/4/24 for Botox therapy and adjustment of seizure medications.  At that visit her Keppra was increased to 500 mg qam, 125mg at 3pm, and 625 mg nightly.    She was seen in the ED x 2 on 12/12/24, the first time for UTI and muscle spasms treated with Klonopin and Macrobid.  She later returned on the same day with severe worsening of her muscle spasms, treated with Seroquel 25mg and Valium 5mg.  Her daughter has been giving her the Klonopin from the ED, but had to stop due to profound fatigue and lethargy.  She has seen mild improvement in her spasms today, but the real reason she brought her back was due to elevated BP at home.     Ms. Pan is a bit lethargic and unable to speak on exam.  Her daughter found her cheeks flushed, which is common when her BP shoots up (has had this happen in the past).  She checked it " "at home and found it to be 190/105, which frightened her and made her come to the ED.  She has seen no N/V/D, fever, chills, but does not hot flashes.      In the ED her VS were BP (!) 228/118 -> 225/98 -> 175/75 -> 152/95   Pulse 98 -> 85   Temp 98.5 °F (36.9 °C) (Axillary)   Resp (!) 24   Ht 5' 2" (1.575 m)   Wt 55 kg (121 lb 4.1 oz)   SpO2 95% RA  BMI 22.18 kg/m².  Labs showed NML CBC, Cr 0.8, Gluc 67 -> 120, Alb 3.4, Trop 0.233, COVID/FLU NEG, UA: nitrite +, LE 1+, WBC 15, many bacteria.    No radiographs done in the ED.  EKG showed NSR rate 90, NS ST-T changes, QTc 479 ms.    In the ED she was treated with:  Medications   labetalol 20 mg/4 mL (5 mg/mL) IV syring (5 mg Intravenous Given 12/13/24 2002)   dextrose 5 % and 0.45 % NaCl infusion (0 mLs Intravenous Stopped 12/13/24 2216)   labetalol 20 mg/4 mL (5 mg/mL) IV syring (10 mg Intravenous Given 12/13/24 2151)   LORazepam (ATIVAN) injection 0.5 mg (0.5 mg Intravenous Given 12/13/24 2208)   aspirin tablet 325 mg (325 mg Oral Given 12/13/24 2305)               * No surgery found *      Hospital Course:   Patient admitted for acute metabolic encephalopathy 2/2 acute Ecoli UTI and influenza A infection. Treated with abx and supportive care initially. Developed wheezing during hospital stay. Initiated treatment for influenza and COPD exacerbation with steroids and tamiflu. Mild elevation in troponin in setting of infection, HTN, and CKD. TTE completed with no WMA and normal EF. Starting amlodipine 5mg daily to be held if SBP <120. Patient is improved and is near her clinical baseline per daughter. She has been evaluated and it is determined that she would benefit from SNF at discharge. Arrangements have been made. Patient has received maximum benefit from inpatient stay and is medically clear for discharge. Provided discharge instructions and return precautions.       Goals of Care Treatment Preferences:  Code Status: Full Code      SDOH Screening:  The " patient was screened for utility difficulties, food insecurity, transport difficulties, housing insecurity, and interpersonal safety and there were no concerns identified this admission.     Consults:   Consults (From admission, onward)          Status Ordering Provider     Case Management/  Once        Provider:  (Not yet assigned)    Completed GINO QUILES              Final Active Diagnoses:    Diagnosis Date Noted POA    PRINCIPAL PROBLEM:  Encephalopathy due to influenza A virus [J09.X9] 12/14/2024 Yes    Influenza [J11.1] 12/16/2024 Yes    CKD stage 3a, GFR 45-59 ml/min [N18.31] 12/16/2024 Yes    Elevated troponin level not due to acute coronary syndrome [R79.89] 12/14/2024 Yes    COPD (chronic obstructive pulmonary disease) [J44.9] 12/14/2024 Yes    Hypoglycemia [E16.2] 12/14/2024 Yes    Seizure disorder as sequela of cerebrovascular accident [I69.398, G40.909] 12/14/2024 Not Applicable    Muscle spasms of lower extremity [M62.838] 12/13/2024 Yes    Malignant essential hypertension [I10] 12/13/2024 Yes    Acute cystitis without hematuria [N30.00] 12/13/2024 Yes    Hx of AKA (above knee amputation), right [Z89.611] 03/13/2023 Not Applicable    Anxiety with depression [F41.8] 09/16/2019 Yes    Aphasia as late effect of cerebrovascular accident [I69.320] 08/05/2019 Not Applicable    H/O: CVA (cerebrovascular accident) [Z86.73] 07/16/2019 Not Applicable      Problems Resolved During this Admission:    Diagnosis Date Noted Date Resolved POA    Encephalopathy due to influenza A virus [J09.X9] 12/14/2024 12/14/2024 Unknown       Discharged Condition: good    Disposition: Skilled Nursing Facility    Follow Up:   Follow-up Information       Dung Jansen MD. Schedule an appointment as soon as possible for a visit today.    Specialty: Neurology  Why: Follow- up with your neurologist at [facility]  Contact information:  [address] [place]  [address]  [phone]               Estee Fierro MD Follow up in 1 week(s).    Specialty: Family Medicine  Why: For hospital follow up  Contact information:  [place] [place] [address] [phone]               [facility] Follow up.    Contact information:  [address] [facility] [address] [phone]                         Patient Instructions:      Diet Adult Regular   Order Comments: Thin liquids     Notify your health care provider if you experience any of the following:  persistent dizziness, light-headedness, or visual disturbances     Notify your health care provider if you experience any of the following:  difficulty breathing or increased cough     Activity as tolerated       Significant Diagnostic Studies: N/A    Pending Diagnostic Studies:       None           Medications:  Reconciled Home Medications:      Medication List        START taking these medications      amLODIPine 5 MG tablet  Commonly known as: NORVASC  Take 1 tablet (5 mg total) by mouth once daily.     levoFLOXacin 750 MG tablet  Commonly known as: LEVAQUIN  Take 1 tablet (750 mg total) by mouth once daily. for 1 day  Start taking on: December 18, 2024     oseltamivir 30 MG capsule  Commonly known as: TAMIFLU  Take 1 capsule (30 mg total) by mouth 2 (two) times daily. for 9 doses     predniSONE 20 MG tablet  Commonly known as: DELTASONE  Take 2 tablets (40 mg total) by mouth once daily. for 3 days  Start taking on: December 18, 2024            CONTINUE taking these medications      albuterol 2.5 mg /3 mL (0.083 %) nebulizer solution  Commonly known as: PROVENTIL  Take 3 mLs (2.5 mg total) by nebulization every 6 (six) hours as needed for Wheezing. Rescue     aspirin 325 MG EC tablet  Commonly known as: ECOTRIN  Take 325 mg by mouth once daily.     baclofen 5 mg Tab tablet  Commonly known as: LIORESAL  Take 5 mg by mouth 3 (three) times daily as needed (Muscle spasms).      DULoxetine 20 MG capsule  Commonly known as: CYMBALTA  TAKE 1 CAPSULE BY MOUTH EVERY DAY AT NIGHT     fluticasone propionate 50 mcg/actuation nasal spray  Commonly known as: FLONASE  USE 1 SPRAY (50 MCG TOTAL) IN EACH NOSTRIL ONCE DAILY     guaiFENesin 600 mg 12 hr tablet  Commonly known as: MUCINEX  Take 1,200 mg by mouth 3 (three) times daily.     Lactobacillus acidophilus 500 million cell Cap  Take 1 capsule by mouth once daily.     * levETIRAcetam 500 MG Tab  Commonly known as: KEPPRA  Take by mouth 2 (two) times daily.     * levETIRAcetam 250 MG Tab  Commonly known as: KEPPRA  Take 0.5 tablets by mouth 2 (two) times a day. Once in the morning with 500mg dose Once at midday     omeprazole 40 MG capsule  Commonly known as: PRILOSEC  Take 40 mg by mouth every morning.           * This list has 2 medication(s) that are the same as other medications prescribed for you. Read the directions carefully, and ask your doctor or other care provider to review them with you.                STOP taking these medications      clonazePAM 0.5 MG tablet  Commonly known as: KlonoPIN     nitrofurantoin (macrocrystal-monohydrate) 100 MG capsule  Commonly known as: MACROBID              Indwelling Lines/Drains at time of discharge:   Lines/Drains/Airways       Drain  Duration             Female External Urinary Catheter w/ Suction 12/14/24 0116 3 days                    Time spent on the discharge of patient: 15 minutes         Rachid Santana MD  Department of Hospital Medicine  [facility]

## 2024-12-18 ENCOUNTER — TELEPHONE (OUTPATIENT)
Dept: ADMINISTRATIVE | Facility: CLINIC | Age: 79
End: 2024-12-18
Payer: MEDICARE

## 2024-12-19 LAB
BACTERIA BLD CULT: NORMAL
BACTERIA BLD CULT: NORMAL

## 2024-12-23 ENCOUNTER — TELEPHONE (OUTPATIENT)
Dept: ADMINISTRATIVE | Facility: CLINIC | Age: 79
End: 2024-12-23
Payer: MEDICARE

## 2024-12-23 DIAGNOSIS — J09.X9: Primary | ICD-10-CM

## 2024-12-30 ENCOUNTER — PATIENT MESSAGE (OUTPATIENT)
Dept: FAMILY MEDICINE | Facility: CLINIC | Age: 79
End: 2024-12-30
Payer: MEDICARE

## 2024-12-31 ENCOUNTER — PATIENT OUTREACH (OUTPATIENT)
Dept: ADMINISTRATIVE | Facility: OTHER | Age: 79
End: 2024-12-31
Payer: MEDICARE

## 2024-12-31 ENCOUNTER — OUTPATIENT CASE MANAGEMENT (OUTPATIENT)
Dept: ADMINISTRATIVE | Facility: OTHER | Age: 79
End: 2024-12-31
Payer: MEDICARE

## 2024-12-31 ENCOUNTER — TELEPHONE (OUTPATIENT)
Dept: ADMINISTRATIVE | Facility: CLINIC | Age: 79
End: 2024-12-31
Payer: MEDICARE

## 2024-12-31 RX ORDER — FUROSEMIDE 20 MG/1
20 TABLET ORAL DAILY
COMMUNITY
Start: 2024-12-20 | End: 2024-12-31 | Stop reason: SDUPTHER

## 2024-12-31 NOTE — TELEPHONE ENCOUNTER
No care due was identified.  [facility] Embedded Care Due Messages. Reference number: [id number].   12/31/2024 7:42:48 AM CST

## 2024-12-31 NOTE — TELEPHONE ENCOUNTER
Refill Routing Note   Medication(s) are not appropriate for processing by [facility] for the following reason(s):        Required vitals abnormal    [facility] action(s):  Defer        Medication Therapy Plan: 12/17/24 (!) 148/70      Appointments  past 12m or future 3m with PCP    Date Provider   Last Visit   8/14/2024 Estee Fierro MD   Next Visit   1/13/2025 Estee Fierro MD   ED visits in past 90 days: 2        Note composed:12:35 PM 12/31/2024

## 2024-12-31 NOTE — PROGRESS NOTES
Caregiver is requesting assistance with PCP ordering more lasix and a sitter. Pt does not have Medicaid and has been denied per caregiver. Caregiver states she is on the waiting list for [facility] for a sitter. Sending in basket request to PCP for more lasix. I will continue to follow pt on [facility] platform.

## 2024-12-31 NOTE — LETTER
Meaghan Pan  [address] [place] [address]      Dear Meaghan Pan,     I work with [facility] Outpatient Care Management Department. We received a referral to call you to discuss your medical history. These services are free of charge and are offered to [facility] patients who have recently been discharged from any of our facilities or who have medical conditions that may require the skill of a nurse to assist with management.     I am a Registered Nurse who specializes in connecting patients with available medical and financial resources as well as addressing any educational needs that may be indicated.    I attempted to reach you by telephone, but I was unsuccessful. Please call our department so that we can go over some questions with you, regarding your health.    The Outpatient Care Management Department can be reached at [phone], from 8:00AM to 4:30 PM, on Monday thru Friday.     Additionally, [facility] also has a program where a nurse is available 24/7 to answer questions or provide medical advice, their number is [phone].      Thanks,    Marianela Breen RN [facility]   Outpatient Care Management  Phone #: [phone]

## 2024-12-31 NOTE — PROGRESS NOTES
12/31/2024  1st attempt to complete Initial Assessment for Outpatient Care Management, left message. Attempt letter sent thru [facility] portal.

## 2024-12-31 NOTE — PROGRESS NOTES
CHW - Initial Contact    This Community Health Worker completed OR updated the Social Determinant of Health questionnaire with caregiver via telephone today.    Pt identified barriers of most importance are: Caregiver is requesting assistance with PCP ordering more lasix and a sitter. Pt does not have Medicaid and has been denied per caregiver. Caregiver states she is on the waiting list for [facility] for a sitter. Sending in basket request to PCP for more lasix.   Referrals to community agencies completed with patient/caregiver consent outside of [facility] include: yes  Referrals were put through [facility] - no:   Support and Services: Residential Care Medication Request  Other information discussed the patient needs / wants help with: SDOH   Follow up required: yes  Follow-up Outreach - Due: 12/31/2024

## 2025-01-01 RX ORDER — FUROSEMIDE 20 MG/1
20 TABLET ORAL DAILY
Qty: 30 TABLET | Refills: 1 | Status: SHIPPED | OUTPATIENT
Start: 2025-01-01 | End: 2025-01-02 | Stop reason: SDUPTHER

## 2025-01-02 ENCOUNTER — PATIENT OUTREACH (OUTPATIENT)
Dept: ADMINISTRATIVE | Facility: OTHER | Age: 80
End: 2025-01-02
Payer: MEDICARE

## 2025-01-02 DIAGNOSIS — Z86.73 H/O: CVA (CEREBROVASCULAR ACCIDENT): Primary | ICD-10-CM

## 2025-01-02 RX ORDER — FUROSEMIDE 20 MG/1
20 TABLET ORAL DAILY
Qty: 30 TABLET | Refills: 0 | Status: SHIPPED | OUTPATIENT
Start: 2025-01-02

## 2025-01-02 NOTE — TELEPHONE ENCOUNTER
No care due was identified.  [facility] Embedded Care Due Messages. Reference number: [id number].   1/02/2025 9:14:04 AM CST

## 2025-01-03 NOTE — PROGRESS NOTES
CHW - Case Closure    This Community Health Worker spoke to patient via telephone today.   Pt/Caregiver reported: Caregiver is picking up lasix for pt at this time. She is thankful for the call back.  Pt/Caregiver denied any additional needs at this time and agrees with episode closure at this time.  Provided caregiver with Community Health Worker's contact information and encouraged him/her to contact this Community Health Worker if additional needs arise.

## 2025-01-08 ENCOUNTER — OUTPATIENT CASE MANAGEMENT (OUTPATIENT)
Dept: ADMINISTRATIVE | Facility: OTHER | Age: 80
End: 2025-01-08
Payer: MEDICARE

## 2025-01-08 NOTE — PROGRESS NOTES
1/8/2025  3rd attempt to complete Initial Assessment for Outpatient Care Management, left message. [facility] Case Closure.

## 2025-01-09 ENCOUNTER — OUTPATIENT CASE MANAGEMENT (OUTPATIENT)
Dept: ADMINISTRATIVE | Facility: OTHER | Age: 80
End: 2025-01-09
Payer: MEDICARE

## 2025-01-09 NOTE — PROGRESS NOTES
Outpatient Care Management  Initial Patient Assessment    Patient: Meaghan Pan  MRN: [id number]  Date of Service: 01/09/2025  Completed by: Marianela Breen RN  Referral Date: 12/23/2024  Date of Eligibility: 1/9/2025  Program:   High Risk  Status: Ongoing  Effective Dates: 1/9/2025 - present  Responsible Staff: Marianela Breen RN        Reason for Visit   Patient presents with    OPCM Enrollment Call    Nursing Assessment       Brief Summary:  Meaghan Pan was referred by Dr. Fierro  for encephalopathy due to Influenza A virus. Patient qualifies for program based on high risk score 79.4%.   Active problem list, medical, surgical and social history reviewed. Active comorbidities include Hx of CVA when she was 41 years old, aphasia, seizures, HTN, CKD, dysphagia,right side hemiparesis and right AKA.. Areas of need identified by patient include BP under control, edema and swallowing.     Admitted to the hospital on 12/13/24 with complaints of spasms to Right BKA and hypertension. /118 in ER. Positive for flu on 12/14/24. Discharged to [facility] 12/17/24 or three days and then discharged home.     Assessment done with daughter, Adriana. Adriana states patient is on the waiting list for [facility] for a sitter. Adriana is a . Patient lives with daughter, NAJMA and 13 year old granddaughter. She is not on medicaid because she makes too much money. She receives extra money thru the state for being disabled.She denies shortness of breath or wheezing.  Pulse ox is 98%. She has edema in her left ankle. Right leg with BKA. She is having problems swallowing and has aphasia. Patient is doing sticker puzzles with colors and is a . Daughter checks BP daily which is fluctuating.  Weight 126 pounds . Patient with stroke when she was 41 years old and can not recall words. Daughter reports that she has muscle spams when she has an infection. Her sodium intake is low at times and  daughter provides gatorade or pedialytel. She is having some confusion today. She is having hearing difficulties but does not like wearing hearing aides in ears. She could wear device around neck to assist with hearing. She needs assistance transferring to toilet for bowel movements. She wears depends. Pain is a 3 in her left shoulder. She has Hx of CVA, right AKA,aphasia, seizures and wheelchair bound.     Upcoing Appt with PCP on 01/14/25    Med Rec done     Next steps: Follow up in one week in or around 01/16/25  Mail High BP in Adults   Refer to SW for advance directive/living will not on file, assistance at home, PHQ=10, was  for 8 years and x was in the ,   Message to PCP- PHQ=10, referral for speech therapy for aphasia and difficulty swallowing and hypertension  Disability Status  Is the patient alert and oriented (person, place, time, and situation)?: Requires Prompting  Hearing Difficulty or Deaf: yes  Visual Difficulty or Blind: yes  Visual and Hearing Needs Conclusion: wears glasses and needs hearing aides  Difficulty Concentrating, Remembering or Making Decisions: yes  Concentration Management: unable to read and follow it since CVA when she was 41 years old  Communication Difficulty: yes  Communication: difficulty speaking  Communication Management: aphasia  Eating/Swallowing Difficulty: yes  Eating/Swallowing: swallowing solid food  Eating/Swallowing Management: needs speech therapy  Walking or Climbing Stairs Difficulty: yes  Walking or Climbing Stairs: ambulation difficulty, requires equipment; stair climbing difficulty, requires equipment; transferring difficulty, assistance 1 person  Mobility Management: uses a w/c  Dressing/Bathing Difficulty: yes  Dressing/Bathing: bathing difficulty, requires equipment; dressing difficulty, assistance 1 person  Dressing/Bathing Management: able to bathe herself and at times needs assistance with dressing  Toileting : Assistance  Required  Continence : Continence - Not a problem  Difficulty Managing Errands Independently: yes  Errands Management: daughter or NAJMA run errands  Equipment Currently Used at Home: raised toilet; bath bench; blood pressure machine; hospital bed; wheelchair  ADL Conclusion Statement: Full assistance with transers and meals  Service Animal Required: no  Change in Functional Status Since Onset of Current Illness/Injury: yes        Spiritual Beliefs  Spiritual, Cultural Beliefs, Worship Practices, Values that Affect Care: no      Social History     Socioeconomic History    Marital status:    Tobacco Use    Smoking status: Former     Types: Cigarettes    Smokeless tobacco: Never   Substance and Sexual Activity    Alcohol use: Never    Drug use: Never    Sexual activity: Never     Social Drivers of Health     Financial Resource Strain: Low Risk  (12/16/2024)    Overall Financial Resource Strain (CARDIA)     Difficulty of Paying Living Expenses: Not very hard   Food Insecurity: No Food Insecurity (12/16/2024)    Hunger Vital Sign     Worried About Running Out of Food in the Last Year: Never true     Ran Out of Food in the Last Year: Never true   Transportation Needs: No Transportation Needs (12/16/2024)    TRANSPORTATION NEEDS     Transportation : No   Physical Activity: Inactive (12/16/2024)    Exercise Vital Sign     Days of Exercise per Week: 0 days     Minutes of Exercise per Session: 0 min   Stress: No Stress Concern Present (12/16/2024)    [employer] [place] [employer] - Occupational Stress Questionnaire     Feeling of Stress : Not at all   Housing Stability: Low Risk  (12/16/2024)    Housing Stability Vital Sign     Unable to Pay for Housing in the Last Year: No     Homeless in the Last Year: No       Roles and Relationships  Primary Source of Support/Comfort: child(walter)  Name of Support/Comfort Primary Source: daughter-Adriana  Secondary Source of Support/Comfort: extended family  Name of  Support/Comfort Secondary Source: NAJMA      Advance Directives (For Healthcare)  Advance Directive  (If Adv Dir status is received, view document under Adv Dir in header or Chart Review Media tab): Patient has advance directive, copy not received.  Patient Requests Assistance: Place consult order to /        Patient Reported Insurance  Verified current insurance plan:: Medicare            1/9/2025    10:57 AM 5/17/2024     2:13 PM 4/23/2024     1:02 PM   Depression Patient Health Questionnaire   Over the last two weeks how often have you been bothered by little interest or pleasure in doing things Several days Not at all Not at all   Over the last two weeks how often have you been bothered by feeling down, depressed or hopeless Nearly every day Not at all Not at all   PHQ-2 Total Score 4 0 0   Over the last two weeks how often have you been bothered by trouble falling or staying asleep, or sleeping too much Not at all     Over the last two weeks how often have you been bothered by feeling tired or having little energy More than half the days     Over the last two weeks how often have you been bothered by a poor appetite or overeating Several days     Over the last two weeks how often have you been bothered by feeling bad about yourself - or that you are a failure or have let yourself or your family down Not at all     Over the last two weeks how often have you been bothered by trouble concentrating on things, such as reading the newspaper or watching television Several days     Over the last two weeks how often have you been bothered by moving or speaking so slowly that other people could have noticed. Or the opposite - being so fidgety or restless that you have been moving around a lot more than usual. More than half the days     Over the last two weeks how often have you been bothered by thoughts that you would be better off dead, or of hurting yourself Not at all     If you checked off  any problems, how difficult have these problems made it for you to do your work, take care of things at home or get along with other people? Not difficult at all     PHQ-9 Score 10     PHQ-9 Interpretation Moderate         Learning Assessment       01/09/2025 1156 [facility] (1/9/2025 - Present)   Created by Marianela Breen, RN -  (Nurse) Status: Complete                 PRIMARY LEARNER     Primary Learner Name:  Adriana Hale DM - 01/09/2025 1156    Relationship:  Patient DM - 01/09/2025 1156    Does the primary learner have any barriers to learning?:  No Barriers DM - 01/09/2025 1156    What is the preferred language of the primary learner?:  English DM - 01/09/2025 1156    Is an  required?:  No DM - 01/09/2025 1156    How does the primary learner prefer to learn new concepts?:  Listening, Reading DM - 01/09/2025 1156    How often do you need to have someone help you read instructions, pamphlets, or written material from your doctor or pharmacy?:  Rarely DM - 01/09/2025 1156        CO-LEARNER #1     No question answered        CO-LEARNER #2     No question answered        SPECIAL TOPICS     No question answered        ANSWERED BY:     No question answered        Comments         Edit History       Marianela Breen, RN -  (Nurse)   01/09/2025 1156

## 2025-01-09 NOTE — LETTER
January 9, 2025             Dear Meaghan Pan:  Adriana Hale :    Welcome to [facility] Complex Care Management Program.   My name is Marianela Breen RN CCM and I look forward to being your .  My goal is to help you function at the healthiest and highest level possible.  You can contact me directly at [phone].    As an [facility] patient, some of the services we may be able to provide include:     Development of an individualized care plan with a Registered Nurse   Connection with a   Connection with available resources and services    Coordinate communication among your care team members   Provide coaching and education   Help you understand your doctors treatment plan  Help you obtain information about your insurance coverage.     All services provided by [facility] Complex Care Managers and other care team members are coordinated with and communicated to your primary care team.      As part of your enrollment, you will be receiving education materials and more information about these services in your [facility] account, by phone or through the mail.  If you do not wish to participate or receive information, please contact our office at [phone].      Sincerely,        Marianela Breen RN CCM [facility]   Out-patient RN Complex Care Manager

## 2025-01-14 ENCOUNTER — OFFICE VISIT (OUTPATIENT)
Dept: FAMILY MEDICINE | Facility: CLINIC | Age: 80
End: 2025-01-14
Payer: MEDICARE

## 2025-01-14 VITALS
OXYGEN SATURATION: 96 % | DIASTOLIC BLOOD PRESSURE: 75 MMHG | HEART RATE: 95 BPM | BODY MASS INDEX: 19.12 KG/M2 | SYSTOLIC BLOOD PRESSURE: 117 MMHG | HEIGHT: 62 IN | WEIGHT: 103.88 LBS | RESPIRATION RATE: 16 BRPM

## 2025-01-14 DIAGNOSIS — R56.9 SEIZURES: ICD-10-CM

## 2025-01-14 DIAGNOSIS — I70.0 AORTIC ATHEROSCLEROSIS: ICD-10-CM

## 2025-01-14 DIAGNOSIS — F33.1 MODERATE EPISODE OF RECURRENT MAJOR DEPRESSIVE DISORDER: ICD-10-CM

## 2025-01-14 DIAGNOSIS — I10 MALIGNANT ESSENTIAL HYPERTENSION: Primary | ICD-10-CM

## 2025-01-14 DIAGNOSIS — J44.9 CHRONIC OBSTRUCTIVE PULMONARY DISEASE, UNSPECIFIED COPD TYPE: ICD-10-CM

## 2025-01-14 DIAGNOSIS — Z78.9 MEDICALLY COMPLEX PATIENT: ICD-10-CM

## 2025-01-14 DIAGNOSIS — N18.31 CKD STAGE 3A, GFR 45-59 ML/MIN: ICD-10-CM

## 2025-01-14 DIAGNOSIS — Z89.611 HX OF AKA (ABOVE KNEE AMPUTATION), RIGHT: ICD-10-CM

## 2025-01-14 DIAGNOSIS — J11.1 INFLUENZA: ICD-10-CM

## 2025-01-14 DIAGNOSIS — R79.89 ELEVATED TROPONIN LEVEL NOT DUE TO ACUTE CORONARY SYNDROME: ICD-10-CM

## 2025-01-14 DIAGNOSIS — R13.10 DYSPHAGIA, UNSPECIFIED TYPE: ICD-10-CM

## 2025-01-14 DIAGNOSIS — G81.10 SPASTIC HEMIPLEGIA AFFECTING DOMINANT SIDE: ICD-10-CM

## 2025-01-14 DIAGNOSIS — M62.838 MUSCLE SPASMS OF LOWER EXTREMITY: ICD-10-CM

## 2025-01-14 PROBLEM — N30.00 ACUTE CYSTITIS WITHOUT HEMATURIA: Status: RESOLVED | Noted: 2024-12-13 | Resolved: 2025-01-14

## 2025-01-14 PROCEDURE — 99213 OFFICE O/P EST LOW 20 MIN: CPT | Mod: PBBFAC,PN | Performed by: STUDENT IN AN ORGANIZED HEALTH CARE EDUCATION/TRAINING PROGRAM

## 2025-01-14 PROCEDURE — 99214 OFFICE O/P EST MOD 30 MIN: CPT | Mod: S$PBB,,, | Performed by: STUDENT IN AN ORGANIZED HEALTH CARE EDUCATION/TRAINING PROGRAM

## 2025-01-14 PROCEDURE — G2211 COMPLEX E/M VISIT ADD ON: HCPCS | Mod: S$PBB,,, | Performed by: STUDENT IN AN ORGANIZED HEALTH CARE EDUCATION/TRAINING PROGRAM

## 2025-01-14 PROCEDURE — 99999 PR PBB SHADOW E&M-EST. PATIENT-LVL III: CPT | Mod: PBBFAC,,, | Performed by: STUDENT IN AN ORGANIZED HEALTH CARE EDUCATION/TRAINING PROGRAM

## 2025-01-14 RX ORDER — MIRTAZAPINE 7.5 MG/1
7.5 TABLET, FILM COATED ORAL NIGHTLY
Qty: 30 TABLET | Refills: 11 | Status: SHIPPED | OUTPATIENT
Start: 2025-01-14 | End: 2026-01-14

## 2025-01-14 NOTE — ASSESSMENT & PLAN NOTE
BMP  Lab Results   Component Value Date     12/17/2024    K 3.9 12/17/2024     12/17/2024    CO2 24 12/17/2024    BUN 9 12/17/2024    CREATININE 0.7 12/17/2024    CALCIUM 8.2 (L) 12/17/2024    ANIONGAP 7 (L) 12/17/2024    EGFRNORACEVR >60.0 12/17/2024     Monitor input and output.  She has had some hyponatremia.  Monitor amount of fluid she is intaking.

## 2025-01-14 NOTE — PROGRESS NOTES
Subjective:       Patient ID: Meaghan Pan is a 79 y.o. female.    Chief Complaint: Follow-up    History of Present Illness    CHIEF COMPLAINT:  Ms. Pan presents today for follow-up after recent hospitalization.    RECENT HOSPITALIZATION:  She was recently hospitalized following severe cramping and flailing episodes. Her home blood pressure was 228/118 mmHg prior to admission. During hospitalization, she was diagnosed with influenza, experienced a cardiac event, and was treated for a urinary tract infection.    BLOOD PRESSURE MANAGEMENT:  She reports fluctuating blood pressure with both hypertensive and hypotensive episodes. Current blood pressure medication, amlodipine, causes significant drops after administration followed by rebounds. She experiences physical symptoms during hypertensive episodes.    FLUID BALANCE AND EDEMA:  She has persistent leg swelling and reports possible edema in other areas of her body despite being on Lasix. She experiences confusion with low sodium levels, which she manages with Gatorade consumption. She recently increased her fluid intake and is not on fluid restriction.    DYSPHAGIA:  She reports ongoing swallowing difficulties, though notes improvement with speech therapy. Due to these difficulties, she has been intentionally limiting her food intake.    APPETITE:  She reports decreased appetite and intentionally skips meals to avoid complications related to bowel movements, swallowing difficulties, and the need for assistance with movement and cleanup. She denies feeling hungry, which may be attributed to her reduced food intake and recent illness.    DEPRESSION:  She reports ongoing depression and is currently taking Cymbalta. She denies prior use of other antidepressants including Zoloft, Lexapro, Paxil, and Prozac.    CURRENT MEDICATIONS:  She takes Cymbalta for depression, Lasix for fluid retention, and amlodipine for blood pressure management.       Review of Systems    Constitutional:  Positive for appetite change, fatigue and unexpected weight change. Negative for activity change, chills and fever.   HENT:  Positive for trouble swallowing.    Respiratory:  Negative for shortness of breath.    Cardiovascular:  Positive for leg swelling. Negative for chest pain.   Gastrointestinal:  Negative for abdominal pain.   Genitourinary:  Negative for dysuria.   Integumentary:  Negative for rash.   Neurological:  Positive for weakness. Negative for seizures.   Psychiatric/Behavioral:  Positive for depressed mood. Negative for sleep disturbance. The patient is nervous/anxious.       Patient Active Problem List   Diagnosis    Tobacco abuse    H/O: CVA (cerebrovascular accident)    Dysphagia    Difficulty mobilizing in home    Chronic right shoulder pain    Aphasia as late effect of cerebrovascular accident    Moderate episode of recurrent major depressive disorder    Leg cramps    Seizures    Hx of AKA (above knee amputation), right    Aortic atherosclerosis    Candidal intertrigo    Hearing loss    Muscle spasms of lower extremity    Malignant essential hypertension    Encephalopathy due to influenza A virus    Elevated troponin level not due to acute coronary syndrome    COPD (chronic obstructive pulmonary disease)    Hypoglycemia    Seizure disorder as sequela of cerebrovascular accident    Influenza    CKD stage 3a, GFR 45-59 ml/min    Spastic hemiplegia affecting dominant side     Meaghan has a current medication list which includes the following prescription(s): albuterol, amlodipine, aspirin, duloxetine, fluticasone propionate, furosemide, guaifenesin, lactobacillus acidophilus, levetiracetam, levetiracetam, baclofen, mirtazapine, and omeprazole.        Health Maintenance Due   Topic Date Due    Hepatitis C Screening  Never done    TETANUS VACCINE  Never done    DEXA Scan  Never done    Shingles Vaccine (1 of 2) Never done    Pneumococcal Vaccines (Age 50+) (2 of 2 - PCV) 01/21/2006     RSV Vaccine (Age 60+ and Pregnant patients) (1 - 1-dose 75+ series) Never done    Influenza Vaccine (1) 09/01/2024    COVID-19 Vaccine (3 - 2024-25 season) 09/01/2024      Health Maintenance reviewed and discussed- encouraged vaccines.     Objective:      Physical Exam  Constitutional:       General: She is not in acute distress.     Appearance: Normal appearance. She is not ill-appearing.   HENT:      Right Ear: Tympanic membrane, ear canal and external ear normal. There is no impacted cerumen.      Left Ear: Tympanic membrane, ear canal and external ear normal. There is no impacted cerumen.   Eyes:      Conjunctiva/sclera: Conjunctivae normal.   Cardiovascular:      Rate and Rhythm: Normal rate and regular rhythm.      Heart sounds: Normal heart sounds. No murmur heard.  Pulmonary:      Effort: Pulmonary effort is normal. No respiratory distress.      Breath sounds: No wheezing, rhonchi or rales.      Comments: Occasional course breath sounds  Musculoskeletal:      Comments: S/p right AKA   Skin:     General: Skin is warm and dry.   Neurological:      Mental Status: She is alert. Mental status is at baseline.      Gait: Gait abnormal (in a wheelchair).      Comments: Significant aphagia- unchanged   Psychiatric:         Mood and Affect: Mood normal.         Behavior: Behavior normal.         Thought Content: Thought content normal.         Judgment: Judgment normal.         Assessment:       Assessment & Plan    1. Cardiac event likely due to vasospasm from stress rather than blockage; no EKG changes or echocardiogram evidence of heart damage  2. Slight enzyme leak indicates some cardiac irritation, possibly related to severe illness (flu) or muscle spasms  3. Blood counts slightly low, potentially due to IV fluid dilution effect  4. Sodium levels likely low due to fluid retention rather than salt deficiency  5. Depression potentially contributing to decreased appetite and fluid/bowel control issues            Plan:       1. Malignant essential hypertension  Assessment & Plan:  Having lots of lows.  Some highs but not sure how accurate they are given the sudden drop later.  Stop amlodipine for now as she is also having more edema and monitor in a week or two       2. Elevated troponin level not due to acute coronary syndrome  Assessment & Plan:  Discussed this with pt and her daughter.      3. Moderate episode of recurrent major depressive disorder  Assessment & Plan:  They would like to stop the medication and try something else.  Considering remeron to help with depression, sleep, and even appetitie    Orders:  -     mirtazapine (REMERON) 7.5 MG Tab; Take 1 tablet (7.5 mg total) by mouth every evening.  Dispense: 30 tablet; Refill: 11    4. Influenza  Assessment & Plan:  Resolved and doing well      5. Muscle spasms of lower extremity  Assessment & Plan:  Seems to be a bit better currently      6. Dysphagia, unspecified type  Assessment & Plan:  In need of continued speech therapy    Orders:  -     SUBSEQUENT HOME HEALTH ORDERS    7. Spastic hemiplegia affecting dominant side  Assessment & Plan:  Stable unchanged      8. Chronic obstructive pulmonary disease, unspecified COPD type  Assessment & Plan:  Doing well after the hospital. No sob. Cough is better recently      9. Seizures  Assessment & Plan:  None recently      10. CKD stage 3a, GFR 45-59 ml/min  Assessment & Plan:  BMP  Lab Results   Component Value Date     12/17/2024    K 3.9 12/17/2024     12/17/2024    CO2 24 12/17/2024    BUN 9 12/17/2024    CREATININE 0.7 12/17/2024    CALCIUM 8.2 (L) 12/17/2024    ANIONGAP 7 (L) 12/17/2024    EGFRNORACEVR >60.0 12/17/2024     Monitor input and output.  She has had some hyponatremia.  Monitor amount of fluid she is intaking.      11. Hx of AKA (above knee amputation), right  Assessment & Plan:  Stable and unchanged.  She is wheelchair dependent.       12. Aortic atherosclerosis  Assessment & Plan:  Continue  risk factor management  Reviewed echo and EKG from hospital.      13. Medically complex patient         This note was generated with the assistance of ambient listening technology. Verbal consent was obtained by the patient and accompanying visitor(s) for the recording of patient appointment to facilitate this note. I attest to having reviewed and edited the generated note for accuracy, though some syntax or spelling errors may persist. Please contact the author of this note for any clarification.

## 2025-01-14 NOTE — ASSESSMENT & PLAN NOTE
They would like to stop the medication and try something else.  Considering remeron to help with depression, sleep, and even appetitie

## 2025-01-14 NOTE — ASSESSMENT & PLAN NOTE
Having lots of lows.  Some highs but not sure how accurate they are given the sudden drop later.  Stop amlodipine for now as she is also having more edema and monitor in a week or two

## 2025-01-16 ENCOUNTER — PATIENT MESSAGE (OUTPATIENT)
Dept: FAMILY MEDICINE | Facility: CLINIC | Age: 80
End: 2025-01-16
Payer: MEDICARE

## 2025-01-16 ENCOUNTER — TELEPHONE (OUTPATIENT)
Dept: FAMILY MEDICINE | Facility: CLINIC | Age: 80
End: 2025-01-16
Payer: MEDICARE

## 2025-01-16 ENCOUNTER — OUTPATIENT CASE MANAGEMENT (OUTPATIENT)
Dept: ADMINISTRATIVE | Facility: OTHER | Age: 80
End: 2025-01-16
Payer: MEDICARE

## 2025-01-16 NOTE — TELEPHONE ENCOUNTER
----- Message from Danielle sent at 1/16/2025 12:32 PM CST -----  Regarding: Call  Type:  Needs Medical Advice    Who Called: Debbie / Matt Perez      Would the patient rather a call back or a response via [facility]? Call    Best Call Back Number: [phone]    Additional Information: New meds was mirtazapine 7.5ML pt was sleepy this morning, would like to know if dosage should be lower. Thanks

## 2025-01-16 NOTE — TELEPHONE ENCOUNTER
Returned call to Debbie wise/ [facility] reports Palliative NP seen pt today  Pt took Mirtazapine 7.5 mg last night at bed time, slept 12 hours and  still lethargic this morning.   NP would like to follow up with MD if medication should be decreased.   Please advise      Contact [facility] office # [phone]

## 2025-01-16 NOTE — LETTER
Meaghan Pan  [address] [place] [address]      Dear Meaghan Pan,     I am your nurse with [facility] Outpatient Care Management Department. I was unsuccessful in reaching you today. At your earliest convenience, I would like to discuss your healthcare progress.      Please contact me at [phone] from 8:00AM to 4:30 PM on Monday thru Friday.     As you know, [facility] is a program offered to you through [facility] where a nurse is available 24/7 to answer questions or provide medical advice, their number is [phone].    Thanks,    Marianela Breen RN [facility]   Outpatient Care Management

## 2025-01-16 NOTE — PROGRESS NOTES
1/16/2025  1st attempt to complete Follow-Up for Outpatient Care Management, left message. Attempt letter sent thru [facility] portal.

## 2025-01-17 NOTE — TELEPHONE ENCOUNTER
----- Message from Keyla sent at 1/17/2025  8:48 AM CST -----  Type:  Needs Medical Advice    Who Called: Mabel with [facility]    Would the patient rather a call back or a response via [facility]? Call    Best Call Back Number: [phone]    Additional Information:  Nurse missed a call in regards to a pt    Please call back to advise. Thanks!

## 2025-01-17 NOTE — TELEPHONE ENCOUNTER
Attempted to contact pt daughter [phone], not accepting calls at this time.   Reached out to [facility], no clinical staff in office at this time. Left message for clinical staff to contact office.     Will attempt to contact daughter again

## 2025-01-17 NOTE — TELEPHONE ENCOUNTER
"Spoke with daughter   Discussed MD message, "Stop the mirtazapine.  Monitor her sedation.  It should start to improve.  If it does not please proceed to the emergency room for further evaluation.  She may not be able to take this medication.  That is the lowest mg tablet.  Even if she cuts it in half, it may be too much.  For now just stop it and monitor.  Thanks  Dr. Fierro "    Daughter reports medication stopped, improvement since d/c  Daughter will continue to monitor pt and contact office for any questions or concerns  "

## 2025-01-23 ENCOUNTER — OUTPATIENT CASE MANAGEMENT (OUTPATIENT)
Dept: ADMINISTRATIVE | Facility: OTHER | Age: 80
End: 2025-01-23
Payer: MEDICARE

## 2025-01-23 NOTE — PROGRESS NOTES
1/23/2025  2nd attempt to complete Follow-Up for Outpatient Care Management, left message.   Her [facility] my chart has a message for Digital HTN program.

## 2025-01-23 NOTE — PROGRESS NOTES
Outpatient Care Management   - Patient Assessment    Patient: Meaghan Pan  MRN:  [id number]  Date of Service:  1/23/2025  Completed by:  Ronda Gimenez LCSW  Referral Date: 12/23/2024    Reason for Visit   Patient presents with    Social Work Assessment       Brief Summary:  received a referral from [facility] RN for the following HR SW psychosocial needs: SDOH, caregiver support, mental health, MPOA not on file. The caregiver also requests assistance with: relaxation techniques. Pt has aphasia secondary to stroke. SW Assessment completed with pts dtr/caregiver Adriana. Pt lives with dtr, son in law, and adult granddtr. She has a right AKA, is wc bound and requires assistance with some ADL's and most IADL's. Per Adriana, she plans to keep pt in the home as long as possible. She's communicated with an elder care  and has looked into nursing home if needed in the future. Adriana states pt has resumed taking Cymbalta after a trial of Remeron was tried.  Adriana denies pt has SI/HI. She states pt is sad and feels isolated often, even though the family is home. When able, the family gets pt out of the home which is reportedly the most effective at improving her mood. Adriana maintains contact with pts providers re: pts mood/medication and is satisfied with pts current mental health treatment. It was agreed, pt likely wouldn't benefit from counseling secondary to aphasia.  Adriana is interested in relaxation techniques. Discussed possible caregiver support options.  Care plan was created in collaboration with patient/caregiver input.   completed the SDOH questionnaire.

## 2025-01-24 DIAGNOSIS — Z86.73 H/O: CVA (CEREBROVASCULAR ACCIDENT): ICD-10-CM

## 2025-01-24 NOTE — TELEPHONE ENCOUNTER
No care due was identified.  Health [facility] Embedded Care Due Messages. Reference number: [id number].   1/24/2025 12:32:34 PM CST

## 2025-01-25 RX ORDER — FUROSEMIDE 20 MG/1
20 TABLET ORAL
Qty: 90 TABLET | Refills: 3 | Status: SHIPPED | OUTPATIENT
Start: 2025-01-25

## 2025-01-25 NOTE — TELEPHONE ENCOUNTER
Refill Routing Note   Medication(s) are not appropriate for processing by [facility] for the following reason(s):        New or recently adjusted medication    [facility] action(s):  Defer        Medication Therapy Plan: New start (1/1/25)      Appointments  past 12m or future 3m with PCP    Date Provider   Last Visit   1/14/2025 Estee Fierro MD   Next Visit   Visit date not found Estee Fierro MD   ED visits in past 90 days: 2        Note composed:7:54 PM 01/24/2025

## 2025-01-30 DIAGNOSIS — Z00.00 ENCOUNTER FOR MEDICARE ANNUAL WELLNESS EXAM: ICD-10-CM

## 2025-01-31 ENCOUNTER — OUTPATIENT CASE MANAGEMENT (OUTPATIENT)
Dept: ADMINISTRATIVE | Facility: OTHER | Age: 80
End: 2025-01-31
Payer: MEDICARE

## 2025-01-31 NOTE — PROGRESS NOTES
1/31/2025  3rd attempt to complete Follow-Up for Outpatient Care Management, left message. Attempt letter sent.   Collaboration with SW on case.

## 2025-01-31 NOTE — LETTER
Meaghan Pan  [id number] [place] [address]      Dear Meaghan Pan:     I am your nurse with [facility]s Outpatient Care Management Department. I was unsuccessful in reaching Adriana today. At your earliest convenience, I would like to discuss your healthcare progress.      Please contact me at [phone] from 8:00AM to 4:30 PM on Monday thru Friday.     As you know, [facility] is a program offered to you through [facility] where a nurse is available 24/7 to answer questions or provide medical advice, their number is [phone].    Thanks,  Marianela Breen RN [facility] [place] Management

## 2025-02-04 ENCOUNTER — OFFICE VISIT (OUTPATIENT)
Dept: FAMILY MEDICINE | Facility: CLINIC | Age: 80
End: 2025-02-04
Payer: MEDICARE

## 2025-02-04 ENCOUNTER — OUTPATIENT CASE MANAGEMENT (OUTPATIENT)
Dept: ADMINISTRATIVE | Facility: OTHER | Age: 80
End: 2025-02-04
Payer: MEDICARE

## 2025-02-04 VITALS
SYSTOLIC BLOOD PRESSURE: 120 MMHG | DIASTOLIC BLOOD PRESSURE: 62 MMHG | RESPIRATION RATE: 16 BRPM | HEART RATE: 78 BPM | HEIGHT: 62 IN | WEIGHT: 107.19 LBS | OXYGEN SATURATION: 95 % | BODY MASS INDEX: 19.72 KG/M2

## 2025-02-04 DIAGNOSIS — Z86.73 H/O: CVA (CEREBROVASCULAR ACCIDENT): ICD-10-CM

## 2025-02-04 DIAGNOSIS — R25.2 LEG CRAMPS: ICD-10-CM

## 2025-02-04 DIAGNOSIS — Z89.611 HX OF AKA (ABOVE KNEE AMPUTATION), RIGHT: ICD-10-CM

## 2025-02-04 DIAGNOSIS — J44.9 CHRONIC OBSTRUCTIVE PULMONARY DISEASE, UNSPECIFIED COPD TYPE: ICD-10-CM

## 2025-02-04 DIAGNOSIS — I10 MALIGNANT ESSENTIAL HYPERTENSION: Primary | ICD-10-CM

## 2025-02-04 DIAGNOSIS — F33.1 MODERATE EPISODE OF RECURRENT MAJOR DEPRESSIVE DISORDER: ICD-10-CM

## 2025-02-04 PROCEDURE — 99212 OFFICE O/P EST SF 10 MIN: CPT | Mod: S$PBB,,,

## 2025-02-04 PROCEDURE — 99999 PR PBB SHADOW E&M-EST. PATIENT-LVL IV: CPT | Mod: PBBFAC,,,

## 2025-02-04 PROCEDURE — 99214 OFFICE O/P EST MOD 30 MIN: CPT | Mod: PBBFAC,PN

## 2025-02-04 NOTE — ASSESSMENT & PLAN NOTE
Continue current medication regimen  Aphasia and right sided hemiparesis noted, no new concerns today

## 2025-02-04 NOTE — ASSESSMENT & PLAN NOTE
Has stopped amlodipine since last visit  Blood pressure controlled at home with one high reading per daughter  Stable today'  Continue to monitor blood pressure at home   Improved edema noted              Bed/Stretcher in lowest position, wheels locked, appropriate side rails in place/Call bell, personal items and telephone in reach/Instruct patient to call for assistance before getting out of bed/chair/stretcher/Non-slip footwear applied when patient is off stretcher/[place] to call system/Physically safe environment - no spills, clutter or unnecessary equipment/Purposeful proactive rounding/Room/bathroom lighting operational, light cord in reach

## 2025-02-04 NOTE — PROGRESS NOTES
Subjective:        Chief Complaint: Hypertension (2 week f/u for high blood pressure)    Meaghan Pan is a 79 y.o. female, presents to clinic For follow up of high blood pressure. Daughter at side and states blood pressure have been controlled except one high reading but did not last long. She has been off of Norvasc and doing well. Improved edema noted.     Overall doing well, improving per daughter         Patient Active Problem List   Diagnosis    Tobacco abuse    H/O: CVA (cerebrovascular accident)    Dysphagia    Difficulty mobilizing in home    Chronic right shoulder pain    Aphasia as late effect of cerebrovascular accident    Moderate episode of recurrent major depressive disorder    Leg cramps    Seizures    Hx of AKA (above knee amputation), right    Aortic atherosclerosis    Candidal intertrigo    Hearing loss    Muscle spasms of lower extremity    Malignant essential hypertension    Encephalopathy due to influenza A virus    Elevated troponin level not due to acute coronary syndrome    COPD (chronic obstructive pulmonary disease)    Hypoglycemia    Seizure disorder as sequela of cerebrovascular accident    Influenza    CKD stage 3a, GFR 45-59 ml/min    Spastic hemiplegia affecting dominant side     Meaghan has a current medication list which includes the following prescription(s): albuterol, amlodipine, aspirin, duloxetine, fluticasone propionate, furosemide, guaifenesin, lactobacillus acidophilus, levetiracetam, levetiracetam, baclofen, mirtazapine, and omeprazole.    Review of Systems   Constitutional: Negative.    HENT: Negative.     Eyes: Negative.    Respiratory:  Positive for cough (chronic,).    Cardiovascular:  Positive for leg swelling (improved).   Gastrointestinal: Negative.    Endocrine: Negative.    Genitourinary: Negative.    Musculoskeletal: Negative.    Integumentary:  Negative.   Neurological: Negative.    Psychiatric/Behavioral: Negative.           Health Maintenance Due   Topic Date  Due    Hepatitis C Screening  Never done    TETANUS VACCINE  Never done    DEXA Scan  Never done    Shingles Vaccine (1 of 2) Never done    Pneumococcal Vaccines (Age 50+) (2 of 2 - PCV) 01/21/2006    RSV Vaccine (Age 60+ and Pregnant patients) (1 - 1-dose 75+ series) Never done    Influenza Vaccine (1) 09/01/2024    COVID-19 Vaccine (3 - 2024-25 season) 09/01/2024      Health Maintenance reviewed and discussed- denied flu and pneumonia vaccine today   Objective:      Physical Exam  Vitals reviewed.   Constitutional:       Appearance: Normal appearance. She is normal weight.   HENT:      Head: Normocephalic.      Nose: Nose normal.   Eyes:      Extraocular Movements: Extraocular movements intact.   Cardiovascular:      Rate and Rhythm: Normal rate and regular rhythm.      Pulses: Normal pulses.   Pulmonary:      Effort: Pulmonary effort is normal.      Breath sounds: Rhonchi (upper right lobe noted, daughter states chronic issue ; clears with cough; chronic cough) present.   Abdominal:      General: Abdomen is flat.      Palpations: Abdomen is soft.   Musculoskeletal:      Cervical back: Normal range of motion.      Comments: Right aka noted; uses wheelchair; right upper extremity hand contracted-limited ROM    Skin:     General: Skin is warm and dry.   Neurological:      General: No focal deficit present.      Mental Status: She is alert.      Comments: Speech deficit noted, hx of cva   Psychiatric:         Mood and Affect: Mood normal.         Behavior: Behavior normal.         Thought Content: Thought content normal.         Judgment: Judgment normal.      Comments: Within normal limits for patient          Assessment:       1. Malignant essential hypertension    2. H/O: CVA (cerebrovascular accident)    3. Moderate episode of recurrent major depressive disorder    4. Chronic obstructive pulmonary disease, unspecified COPD type    5. Hx of AKA (above knee amputation), right    6. Leg cramps        Plan:       1.  Malignant essential hypertension  Assessment & Plan:  Has stopped amlodipine since last visit  Blood pressure controlled at home with one high reading per daughter  Stable today'  Continue to monitor blood pressure at home   Improved edema noted                 2. H/O: CVA (cerebrovascular accident)  Overview:  Last Assessment & Plan:   Formatting of this note might be different from the original.  Continue her aspirin.    Assessment & Plan:  Continue current medication regimen  Aphasia and right sided hemiparesis noted, no new concerns today       3. Moderate episode of recurrent major depressive disorder  Assessment & Plan:  Did not start Remeron  But did restart Cymbalta 20mg nightly  Daughter states doing very well on current dose  Continue currently medication regimen       4. Chronic obstructive pulmonary disease, unspecified COPD type  Assessment & Plan:  Continue with cough, no shortness of breath  No concerns today  Takes mucinex bid       5. Hx of AKA (above knee amputation), right  Assessment & Plan:  No concerns today  In wheelchair   Daughter is caregiver, helps with transfer      6. Leg cramps  Assessment & Plan:  Improved  No concerns today

## 2025-02-04 NOTE — ASSESSMENT & PLAN NOTE
Did not start Remeron  But did restart Cymbalta 20mg nightly  Daughter states doing very well on current dose  Continue currently medication regimen

## 2025-02-05 NOTE — PROGRESS NOTES
I have reviewed the notes, assessments, and/or procedures performed by Lucia Hart NP, I concur with her/his documentation of Meaghan Pan.  Pt and daughter personally seen.  Ms Harding looks much better.  Brighter and happy.  BP is well controlled, edema is better. No cramping.  Continue current course

## 2025-02-25 ENCOUNTER — RESULTS FOLLOW-UP (OUTPATIENT)
Dept: FAMILY MEDICINE | Facility: CLINIC | Age: 80
End: 2025-02-25

## 2025-02-25 ENCOUNTER — LAB VISIT (OUTPATIENT)
Dept: LAB | Facility: HOSPITAL | Age: 80
End: 2025-02-25
Payer: MEDICARE

## 2025-02-25 ENCOUNTER — OFFICE VISIT (OUTPATIENT)
Dept: FAMILY MEDICINE | Facility: CLINIC | Age: 80
End: 2025-02-25
Payer: MEDICARE

## 2025-02-25 VITALS
HEIGHT: 62 IN | WEIGHT: 107.13 LBS | HEART RATE: 77 BPM | RESPIRATION RATE: 16 BRPM | OXYGEN SATURATION: 95 % | BODY MASS INDEX: 19.71 KG/M2

## 2025-02-25 DIAGNOSIS — I10 MALIGNANT ESSENTIAL HYPERTENSION: Primary | ICD-10-CM

## 2025-02-25 DIAGNOSIS — F33.1 MODERATE EPISODE OF RECURRENT MAJOR DEPRESSIVE DISORDER: ICD-10-CM

## 2025-02-25 DIAGNOSIS — R25.2 LEG CRAMPS: ICD-10-CM

## 2025-02-25 DIAGNOSIS — E87.1 HYPONATREMIA: ICD-10-CM

## 2025-02-25 LAB
ALBUMIN SERPL BCP-MCNC: 3.5 G/DL (ref 3.5–5.2)
ALP SERPL-CCNC: 85 U/L (ref 40–150)
ALT SERPL W/O P-5'-P-CCNC: 10 U/L (ref 10–44)
ANION GAP SERPL CALC-SCNC: 13 MMOL/L (ref 8–16)
AST SERPL-CCNC: 19 U/L (ref 10–40)
BILIRUB SERPL-MCNC: 0.6 MG/DL (ref 0.1–1)
BUN SERPL-MCNC: 15 MG/DL (ref 8–23)
CALCIUM SERPL-MCNC: 8.7 MG/DL (ref 8.7–10.5)
CHLORIDE SERPL-SCNC: 104 MMOL/L (ref 95–110)
CO2 SERPL-SCNC: 21 MMOL/L (ref 23–29)
CREAT SERPL-MCNC: 0.8 MG/DL (ref 0.5–1.4)
EST. GFR  (NO RACE VARIABLE): >60 ML/MIN/1.73 M^2
GLUCOSE SERPL-MCNC: 82 MG/DL (ref 70–110)
POTASSIUM SERPL-SCNC: 4 MMOL/L (ref 3.5–5.1)
PROT SERPL-MCNC: 7 G/DL (ref 6–8.4)
SODIUM SERPL-SCNC: 138 MMOL/L (ref 136–145)

## 2025-02-25 PROCEDURE — 99213 OFFICE O/P EST LOW 20 MIN: CPT | Mod: PBBFAC,PN

## 2025-02-25 PROCEDURE — 99999 PR PBB SHADOW E&M-EST. PATIENT-LVL III: CPT | Mod: PBBFAC,,,

## 2025-02-25 PROCEDURE — 36415 COLL VENOUS BLD VENIPUNCTURE: CPT

## 2025-02-25 PROCEDURE — 80053 COMPREHEN METABOLIC PANEL: CPT

## 2025-02-25 NOTE — ASSESSMENT & PLAN NOTE
Has not taken amlodipine since last visit, only will use PRN   Blood pressure controlled at home   Stable today  Continue to monitor blood pressure at home closely, as today reading lower than usual 108/78.   Daughter states history of labile BP   Improved edema noted

## 2025-02-25 NOTE — ASSESSMENT & PLAN NOTE
Stable on Cymbalta 20mg nightly  Daughter states doing very well on current dose  Continue currently medication regimen

## 2025-02-25 NOTE — PROGRESS NOTES
I have reviewed the notes, assessments, and/or procedures performed by Lucia Hart NP, I concur with her/his documentation of Meaghan Pan.

## 2025-02-25 NOTE — PROGRESS NOTES
Subjective:        Chief Complaint: Follow-up (2 week BP and depression follow up)    Meaghan Pan is a 79 y.o. female, presents to clinic For follow up of hypertension. Daughter at side. States blood pressures have been controlled at home. She has not taken Norvasc since last visit. Only will take as needed if blood pressures are running high. She denies leg cramps.     Daughter is concern with patients depression, states Cymbalta is working well but has been in the house lately due to the outbreak of flu/noravirus, daughter didn't want her to be in contact.     Daughter states patient has history of low sodium     Follow-up  Associated symptoms include coughing (chronic,).       Patient Active Problem List   Diagnosis    Tobacco abuse    H/O: CVA (cerebrovascular accident)    Dysphagia    Difficulty mobilizing in home    Chronic right shoulder pain    Aphasia as late effect of cerebrovascular accident    Moderate episode of recurrent major depressive disorder    Leg cramps    Seizures    Hx of AKA (above knee amputation), right    Aortic atherosclerosis    Candidal intertrigo    Hearing loss    Muscle spasms of lower extremity    Malignant essential hypertension    Encephalopathy due to influenza A virus    Elevated troponin level not due to acute coronary syndrome    COPD (chronic obstructive pulmonary disease)    Hypoglycemia    Seizure disorder as sequela of cerebrovascular accident    Influenza    CKD stage 3a, GFR 45-59 ml/min    Spastic hemiplegia affecting dominant side    Hyponatremia     Meaghan has a current medication list which includes the following prescription(s): albuterol, amlodipine, aspirin, baclofen, duloxetine, fluticasone propionate, furosemide, guaifenesin, lactobacillus acidophilus, levetiracetam, levetiracetam, mirtazapine, and omeprazole.    Review of Systems   Constitutional: Negative.    HENT: Negative.     Eyes: Negative.    Respiratory:  Positive for cough (chronic,).     Cardiovascular:  Positive for leg swelling (improved).   Gastrointestinal: Negative.    Endocrine: Negative.    Genitourinary: Negative.    Musculoskeletal: Negative.    Integumentary:  Negative.   Neurological: Negative.    Psychiatric/Behavioral: Negative.           Health Maintenance Due   Topic Date Due    Hepatitis C Screening  Never done    TETANUS VACCINE  Never done    DEXA Scan  Never done    Shingles Vaccine (1 of 2) Never done    Pneumococcal Vaccines (Age 50+) (2 of 2 - PCV) 01/21/2006    RSV Vaccine (Age 60+ and Pregnant patients) (1 - 1-dose 75+ series) Never done    Influenza Vaccine (1) 09/01/2024    COVID-19 Vaccine (3 - 2024-25 season) 09/01/2024      Health Maintenance reviewed and discussed- denied flu and pneumonia vaccine today   Objective:      Physical Exam  Vitals reviewed.   Constitutional:       Appearance: Normal appearance. She is normal weight.   HENT:      Head: Normocephalic.      Nose: Nose normal.   Eyes:      Extraocular Movements: Extraocular movements intact.   Cardiovascular:      Rate and Rhythm: Normal rate and regular rhythm.      Pulses: Normal pulses.      Heart sounds: Normal heart sounds.   Pulmonary:      Effort: Pulmonary effort is normal.      Breath sounds: Normal breath sounds. No rhonchi.      Comments: Chronic cough    Abdominal:      General: Abdomen is flat.      Palpations: Abdomen is soft.   Musculoskeletal:      Cervical back: Normal range of motion.      Comments: Right aka noted; uses wheelchair; right upper extremity hand contracted-limited ROM    Skin:     General: Skin is warm and dry.   Neurological:      General: No focal deficit present.      Mental Status: She is alert.      Comments: Speech deficit noted, hx of cva   Psychiatric:         Mood and Affect: Mood normal.         Behavior: Behavior normal.         Thought Content: Thought content normal.         Judgment: Judgment normal.      Comments: Within normal limits for patient           Assessment:       1. Malignant essential hypertension    2. Hyponatremia    3. Leg cramps    4. Moderate episode of recurrent major depressive disorder        Plan:       1. Malignant essential hypertension  Assessment & Plan:  Has not taken amlodipine since last visit, only will use PRN   Blood pressure controlled at home   Stable today  Continue to monitor blood pressure at home closely, as today reading lower than usual 108/78.   Daughter states history of labile BP   Improved edema noted      2. Hyponatremia  Assessment & Plan:  History of low sodium  Has taken sodium tabs in the past  request to recheck toay     Orders:  -     Comprehensive Metabolic Panel; Future; Expected date: 02/25/2025    3. Leg cramps  Assessment & Plan:  Improved  Dependent edema noted, improves when laying down       4. Moderate episode of recurrent major depressive disorder  Assessment & Plan:  Stable on Cymbalta 20mg nightly  Daughter states doing very well on current dose  Continue currently medication regimen

## 2025-02-26 ENCOUNTER — OUTPATIENT CASE MANAGEMENT (OUTPATIENT)
Dept: ADMINISTRATIVE | Facility: OTHER | Age: 80
End: 2025-02-26
Payer: MEDICARE

## 2025-02-26 ENCOUNTER — PATIENT MESSAGE (OUTPATIENT)
Dept: ADMINISTRATIVE | Facility: OTHER | Age: 80
End: 2025-02-26
Payer: MEDICARE

## 2025-02-26 NOTE — PROGRESS NOTES
05/26/25-  Conference call with [facility] SW and unable to leave a message. 5'th attempt for follow up. SW continues to be on case.

## 2025-03-05 ENCOUNTER — PATIENT MESSAGE (OUTPATIENT)
Dept: FAMILY MEDICINE | Facility: CLINIC | Age: 80
End: 2025-03-05
Payer: MEDICARE

## 2025-03-05 DIAGNOSIS — R56.9 SEIZURES: Primary | ICD-10-CM

## 2025-03-06 ENCOUNTER — OUTPATIENT CASE MANAGEMENT (OUTPATIENT)
Dept: ADMINISTRATIVE | Facility: OTHER | Age: 80
End: 2025-03-06
Payer: MEDICARE

## 2025-03-06 RX ORDER — LEVETIRACETAM 500 MG/1
500 TABLET ORAL 2 TIMES DAILY
Qty: 180 TABLET | Refills: 3 | Status: SHIPPED | OUTPATIENT
Start: 2025-03-06

## 2025-03-06 RX ORDER — LEVETIRACETAM 250 MG/1
125 TABLET ORAL DAILY
Qty: 90 TABLET | Refills: 3 | Status: SHIPPED | OUTPATIENT
Start: 2025-03-06

## 2025-03-06 NOTE — PROGRESS NOTES
03/06/25-No call back from daughter. SW has closed case. Will dis-enroll from [facility]. [facility] Case Closure.

## 2025-03-21 ENCOUNTER — TELEPHONE (OUTPATIENT)
Dept: FAMILY MEDICINE | Facility: CLINIC | Age: 80
End: 2025-03-21
Payer: MEDICARE

## 2025-03-21 NOTE — TELEPHONE ENCOUNTER
----- Message from Loy sent at 3/21/2025 12:45 PM CDT -----  Contact: Adriana  Type: Needs Medical AdviceWho Called:  Adriana - DaughterPharmacy:[id number] - [place], [place] - [address] [place] [address]Psxwm: [phone] Fax: [phone] Call Back Number: [phone] Information: Adriana is requesting a call back regarding the patients appt this evening and Adriana is in [place] on a flight, the patient is cramping again, and they will need something for anxiety, and antibiotic for possible ear infection.

## 2025-03-21 NOTE — TELEPHONE ENCOUNTER
Discuss what was going on with Ms. Harding with her daughter . Her leg is cramping and jumping where she had it amputated. That normally means she has an infection somewhere in the body, or a  virus. Her daughter was to get a handle on this before it gets really bad. She also stated that Ms. Harding had 3 bowel movements today and her allergies are really bad today.  Katelynn Ryan MA 03/21/2025 1:00 PM

## 2025-03-25 ENCOUNTER — OFFICE VISIT (OUTPATIENT)
Dept: FAMILY MEDICINE | Facility: CLINIC | Age: 80
End: 2025-03-25
Payer: MEDICARE

## 2025-03-25 VITALS
RESPIRATION RATE: 16 BRPM | WEIGHT: 107 LBS | SYSTOLIC BLOOD PRESSURE: 120 MMHG | HEIGHT: 62 IN | DIASTOLIC BLOOD PRESSURE: 70 MMHG | OXYGEN SATURATION: 97 % | HEART RATE: 80 BPM | BODY MASS INDEX: 19.69 KG/M2

## 2025-03-25 DIAGNOSIS — J06.9 UPPER RESPIRATORY TRACT INFECTION, UNSPECIFIED TYPE: ICD-10-CM

## 2025-03-25 DIAGNOSIS — Z00.00 PHYSICAL EXAM: ICD-10-CM

## 2025-03-25 DIAGNOSIS — H61.23 IMPACTED CERUMEN OF BOTH EARS: Primary | ICD-10-CM

## 2025-03-25 PROCEDURE — 99214 OFFICE O/P EST MOD 30 MIN: CPT | Mod: PBBFAC,PN

## 2025-03-25 PROCEDURE — 99999 PR PBB SHADOW E&M-EST. PATIENT-LVL IV: CPT | Mod: PBBFAC,,,

## 2025-03-25 PROCEDURE — 99213 OFFICE O/P EST LOW 20 MIN: CPT | Mod: S$PBB,,,

## 2025-03-25 RX ORDER — AZITHROMYCIN 250 MG/1
250 TABLET, FILM COATED ORAL ONCE
COMMUNITY
Start: 2025-03-21

## 2025-03-25 NOTE — PROGRESS NOTES
Subjective:        Chief Complaint: Follow-up (Urgent care f/u from infection)    Meaghan Pan is a 79 y.o. female, presents to clinic For follow up of not feeling well last week. Patient did not go to urgent care but was started on antibiotics for possible upper respiratory infection. Daughter is side and states much improvement in mother.   She has concerns today with hard of hearing in left ear, states history of impacted wax.   Denies fever, urinary issues.   Overall patient is in good spirits.     Follow-up  Associated symptoms include coughing.       Patient Active Problem List   Diagnosis    Tobacco abuse    H/O: CVA (cerebrovascular accident)    Dysphagia    Difficulty mobilizing in home    Chronic right shoulder pain    Aphasia as late effect of cerebrovascular accident    Moderate episode of recurrent major depressive disorder    Leg cramps    Seizures    Hx of AKA (above knee amputation), right    Aortic atherosclerosis    Candidal intertrigo    Hearing loss    Muscle spasms of lower extremity    Malignant essential hypertension    Encephalopathy due to influenza A virus    Elevated troponin level not due to acute coronary syndrome    COPD (chronic obstructive pulmonary disease)    Hypoglycemia    Seizure disorder as sequela of cerebrovascular accident    Influenza    CKD stage 3a, GFR 45-59 ml/min    Spastic hemiplegia affecting dominant side    Hyponatremia     Meaghan has a current medication list which includes the following prescription(s): albuterol, amlodipine, aspirin, azithromycin, baclofen, duloxetine, fluticasone propionate, furosemide, guaifenesin, lactobacillus acidophilus, levetiracetam, levetiracetam, mirtazapine, and omeprazole.    Review of Systems   Constitutional: Negative.    HENT: Negative.     Eyes: Negative.    Respiratory:  Positive for cough. Negative for shortness of breath and wheezing.    Cardiovascular: Negative.    Gastrointestinal: Negative.    Endocrine: Negative.     Genitourinary: Negative.    Musculoskeletal: Negative.    Integumentary:  Negative.   Neurological: Negative.    Psychiatric/Behavioral: Negative.           Health Maintenance Due   Topic Date Due    Hepatitis C Screening  Never done    TETANUS VACCINE  Never done    DEXA Scan  Never done    Shingles Vaccine (1 of 2) Never done    Pneumococcal Vaccines (Age 50+) (2 of 2 - PCV) 01/21/2006    RSV Vaccine (Age 60+ and Pregnant patients) (1 - 1-dose 75+ series) Never done    Influenza Vaccine (1) 09/01/2024    COVID-19 Vaccine (3 - 2024-25 season) 09/01/2024      Health Maintenance reviewed and discussed-   Objective:      Physical Exam  Vitals reviewed.   Constitutional:       Appearance: Normal appearance. She is normal weight.   HENT:      Head: Normocephalic.      Nose: Nose normal.   Eyes:      Extraocular Movements: Extraocular movements intact.   Cardiovascular:      Rate and Rhythm: Normal rate and regular rhythm.      Pulses: Normal pulses.      Heart sounds: Normal heart sounds.   Pulmonary:      Effort: Pulmonary effort is normal.      Breath sounds: Normal breath sounds. No rhonchi (left upper lobe rhonchi noted, clears with cough).      Comments: Chronic cough    Abdominal:      General: Abdomen is flat.      Palpations: Abdomen is soft.   Musculoskeletal:      Cervical back: Normal range of motion.      Comments: Right aka noted; uses wheelchair; right upper extremity hand contracted-limited ROM    Skin:     General: Skin is warm and dry.   Neurological:      General: No focal deficit present.      Mental Status: She is alert.      Comments: Speech deficit noted, hx of cva   Psychiatric:         Mood and Affect: Mood normal.         Behavior: Behavior normal.         Thought Content: Thought content normal.         Judgment: Judgment normal.      Comments: Within normal limits for patient          Assessment:       1. Impacted cerumen of both ears    2. Upper respiratory tract infection, unspecified type     3. Physical exam        Plan:       1. Impacted cerumen of both ears  Comments:  Successful bilaterally ear irrigation  Immediately relief    2. Upper respiratory tract infection, unspecified type  Comments:  Current on zpak  still taking, improvement noted   Encourage to complete antibiotics  F/u for any concerns    3. Physical exam

## 2025-04-22 ENCOUNTER — TELEPHONE (OUTPATIENT)
Dept: FAMILY MEDICINE | Facility: CLINIC | Age: 80
End: 2025-04-22
Payer: MEDICARE

## 2025-04-22 NOTE — TELEPHONE ENCOUNTER
Copied from CRM #[id number]. Topic: Appointments - Appointment Rescheduling  >> Apr 22, 2025  3:54 PM Mabel wrote:  Pt needs to reschedule appt for today    Call- [phone]

## 2025-05-30 RX ORDER — DULOXETIN HYDROCHLORIDE 20 MG/1
20 CAPSULE, DELAYED RELEASE ORAL NIGHTLY
Qty: 90 CAPSULE | Refills: 2 | Status: SHIPPED | OUTPATIENT
Start: 2025-05-30

## 2025-05-30 NOTE — TELEPHONE ENCOUNTER
No care due was identified.  Health [facility] Embedded Care Due Messages. Reference number: [id number].   5/30/2025 12:40:51 PM CDT

## 2025-05-31 ENCOUNTER — PATIENT MESSAGE (OUTPATIENT)
Dept: FAMILY MEDICINE | Facility: CLINIC | Age: 80
End: 2025-05-31
Payer: MEDICARE

## 2025-05-31 NOTE — TELEPHONE ENCOUNTER
Refill Decision Note   Meaghan Pan  is requesting a refill authorization.  Brief Assessment and Rationale for Refill:  Approve     Medication Therapy Plan:         Comments:     Note composed:9:12 PM 05/30/2025

## 2025-06-03 ENCOUNTER — OFFICE VISIT (OUTPATIENT)
Dept: FAMILY MEDICINE | Facility: CLINIC | Age: 80
End: 2025-06-03
Payer: MEDICARE

## 2025-06-03 ENCOUNTER — LAB VISIT (OUTPATIENT)
Dept: LAB | Facility: HOSPITAL | Age: 80
End: 2025-06-03
Attending: STUDENT IN AN ORGANIZED HEALTH CARE EDUCATION/TRAINING PROGRAM
Payer: MEDICARE

## 2025-06-03 VITALS
BODY MASS INDEX: 19.62 KG/M2 | DIASTOLIC BLOOD PRESSURE: 82 MMHG | SYSTOLIC BLOOD PRESSURE: 126 MMHG | RESPIRATION RATE: 16 BRPM | HEIGHT: 62 IN | HEART RATE: 91 BPM | WEIGHT: 106.63 LBS | OXYGEN SATURATION: 96 %

## 2025-06-03 DIAGNOSIS — R56.9 SEIZURES: ICD-10-CM

## 2025-06-03 DIAGNOSIS — Z79.899 ENCOUNTER FOR LONG-TERM (CURRENT) USE OF MEDICATIONS: ICD-10-CM

## 2025-06-03 DIAGNOSIS — Z74.09 DIFFICULTY MOBILIZING IN HOME: ICD-10-CM

## 2025-06-03 DIAGNOSIS — Z11.59 ENCOUNTER FOR HEPATITIS C SCREENING TEST FOR LOW RISK PATIENT: ICD-10-CM

## 2025-06-03 DIAGNOSIS — E87.1 HYPONATREMIA: ICD-10-CM

## 2025-06-03 DIAGNOSIS — Z13.6 ENCOUNTER FOR LIPID SCREENING FOR CARDIOVASCULAR DISEASE: ICD-10-CM

## 2025-06-03 DIAGNOSIS — Z13.220 ENCOUNTER FOR LIPID SCREENING FOR CARDIOVASCULAR DISEASE: ICD-10-CM

## 2025-06-03 DIAGNOSIS — M62.838 MUSCLE SPASMS OF LOWER EXTREMITY: ICD-10-CM

## 2025-06-03 DIAGNOSIS — H91.90 HEARING LOSS, UNSPECIFIED HEARING LOSS TYPE, UNSPECIFIED LATERALITY: ICD-10-CM

## 2025-06-03 DIAGNOSIS — Z86.73 H/O: CVA (CEREBROVASCULAR ACCIDENT): Primary | ICD-10-CM

## 2025-06-03 PROBLEM — I69.398 SEIZURE DISORDER AS SEQUELA OF CEREBROVASCULAR ACCIDENT: Status: RESOLVED | Noted: 2024-12-14 | Resolved: 2025-06-03

## 2025-06-03 PROBLEM — E16.2 HYPOGLYCEMIA: Status: RESOLVED | Noted: 2024-12-14 | Resolved: 2025-06-03

## 2025-06-03 PROBLEM — G40.909 SEIZURE DISORDER AS SEQUELA OF CEREBROVASCULAR ACCIDENT: Status: RESOLVED | Noted: 2024-12-14 | Resolved: 2025-06-03

## 2025-06-03 PROBLEM — R25.2 LEG CRAMPS: Status: RESOLVED | Noted: 2022-10-10 | Resolved: 2025-06-03

## 2025-06-03 PROBLEM — J09.X9: Status: RESOLVED | Noted: 2024-12-14 | Resolved: 2025-06-03

## 2025-06-03 LAB
ALBUMIN SERPL BCP-MCNC: 3.6 G/DL (ref 3.5–5.2)
ALP SERPL-CCNC: 103 UNIT/L (ref 40–150)
ALT SERPL W/O P-5'-P-CCNC: 13 UNIT/L (ref 10–44)
ANION GAP (OHS): 12 MMOL/L (ref 8–16)
AST SERPL-CCNC: 23 UNIT/L (ref 11–45)
BILIRUB SERPL-MCNC: 0.5 MG/DL (ref 0.1–1)
BUN SERPL-MCNC: 22 MG/DL (ref 8–23)
CALCIUM SERPL-MCNC: 9.4 MG/DL (ref 8.7–10.5)
CHLORIDE SERPL-SCNC: 106 MMOL/L (ref 95–110)
CHOLEST SERPL-MCNC: 280 MG/DL (ref 120–199)
CHOLEST/HDLC SERPL: 4.6 {RATIO} (ref 2–5)
CO2 SERPL-SCNC: 24 MMOL/L (ref 23–29)
CREAT SERPL-MCNC: 0.9 MG/DL (ref 0.5–1.4)
ERYTHROCYTE [DISTWIDTH] IN BLOOD BY AUTOMATED COUNT: 13.3 % (ref 11.5–14.5)
GFR SERPLBLD CREATININE-BSD FMLA CKD-EPI: >60 ML/MIN/1.73/M2
GLUCOSE SERPL-MCNC: 78 MG/DL (ref 70–110)
HCT VFR BLD AUTO: 42.9 % (ref 37–48.5)
HDLC SERPL-MCNC: 61 MG/DL (ref 40–75)
HDLC SERPL: 21.8 % (ref 20–50)
HGB BLD-MCNC: 13.9 GM/DL (ref 12–16)
HOLD SPECIMEN: NORMAL
LDLC SERPL CALC-MCNC: 199 MG/DL (ref 63–159)
MAGNESIUM SERPL-MCNC: 2.3 MG/DL (ref 1.6–2.6)
MCH RBC QN AUTO: 28 PG (ref 27–31)
MCHC RBC AUTO-ENTMCNC: 32.4 G/DL (ref 32–36)
MCV RBC AUTO: 87 FL (ref 82–98)
NONHDLC SERPL-MCNC: 219 MG/DL
PLATELET # BLD AUTO: 282 K/UL (ref 150–450)
PMV BLD AUTO: 9.2 FL (ref 9.2–12.9)
POTASSIUM SERPL-SCNC: 3.6 MMOL/L (ref 3.5–5.1)
PROT SERPL-MCNC: 7.6 GM/DL (ref 6–8.4)
RBC # BLD AUTO: 4.96 M/UL (ref 4–5.4)
SODIUM SERPL-SCNC: 142 MMOL/L (ref 136–145)
TRIGL SERPL-MCNC: 100 MG/DL (ref 30–150)
TSH SERPL-ACNC: 0.84 UIU/ML (ref 0.4–4)
WBC # BLD AUTO: 8.85 K/UL (ref 3.9–12.7)

## 2025-06-03 PROCEDURE — 99213 OFFICE O/P EST LOW 20 MIN: CPT | Mod: PBBFAC,PN | Performed by: STUDENT IN AN ORGANIZED HEALTH CARE EDUCATION/TRAINING PROGRAM

## 2025-06-03 PROCEDURE — 82565 ASSAY OF CREATININE: CPT

## 2025-06-03 PROCEDURE — 80061 LIPID PANEL: CPT

## 2025-06-03 PROCEDURE — 82607 VITAMIN B-12: CPT

## 2025-06-03 PROCEDURE — 83735 ASSAY OF MAGNESIUM: CPT

## 2025-06-03 PROCEDURE — 36415 COLL VENOUS BLD VENIPUNCTURE: CPT

## 2025-06-03 PROCEDURE — 99999 PR PBB SHADOW E&M-EST. PATIENT-LVL III: CPT | Mod: PBBFAC,,, | Performed by: STUDENT IN AN ORGANIZED HEALTH CARE EDUCATION/TRAINING PROGRAM

## 2025-06-03 PROCEDURE — 85027 COMPLETE CBC AUTOMATED: CPT

## 2025-06-03 PROCEDURE — 84443 ASSAY THYROID STIM HORMONE: CPT

## 2025-06-03 PROCEDURE — 86803 HEPATITIS C AB TEST: CPT

## 2025-06-03 RX ORDER — LEVETIRACETAM 250 MG/1
125 TABLET ORAL DAILY
Qty: 90 TABLET | Refills: 3 | Status: SHIPPED | OUTPATIENT
Start: 2025-06-03

## 2025-06-04 LAB
HCV AB SERPL QL IA: NORMAL
VIT B12 SERPL-MCNC: 375 PG/ML (ref 210–950)

## 2025-06-05 ENCOUNTER — RESULTS FOLLOW-UP (OUTPATIENT)
Dept: FAMILY MEDICINE | Facility: CLINIC | Age: 80
End: 2025-06-05

## 2025-06-06 ENCOUNTER — PATIENT MESSAGE (OUTPATIENT)
Dept: FAMILY MEDICINE | Facility: CLINIC | Age: 80
End: 2025-06-06
Payer: MEDICARE

## 2025-08-26 ENCOUNTER — OFFICE VISIT (OUTPATIENT)
Dept: FAMILY MEDICINE | Facility: CLINIC | Age: 80
End: 2025-08-26
Payer: MEDICARE

## 2025-08-26 VITALS
HEIGHT: 62 IN | WEIGHT: 106 LBS | DIASTOLIC BLOOD PRESSURE: 72 MMHG | OXYGEN SATURATION: 99 % | HEART RATE: 84 BPM | SYSTOLIC BLOOD PRESSURE: 120 MMHG | RESPIRATION RATE: 16 BRPM | BODY MASS INDEX: 19.51 KG/M2

## 2025-08-26 DIAGNOSIS — S81.812A SKIN TEAR OF LEFT LOWER LEG WITHOUT COMPLICATION, INITIAL ENCOUNTER: Primary | ICD-10-CM

## 2025-08-26 DIAGNOSIS — B35.1 TINEA UNGUIUM: ICD-10-CM

## 2025-08-26 DIAGNOSIS — S80.12XA CONTUSION OF LEFT LOWER LEG, INITIAL ENCOUNTER: ICD-10-CM

## 2025-08-26 PROCEDURE — 99999 PR PBB SHADOW E&M-EST. PATIENT-LVL IV: CPT | Mod: PBBFAC,,,

## 2025-08-26 PROCEDURE — 99214 OFFICE O/P EST MOD 30 MIN: CPT | Mod: PBBFAC,PN

## 2025-08-26 RX ORDER — MUPIROCIN 20 MG/G
OINTMENT TOPICAL 3 TIMES DAILY
Qty: 1 G | Refills: 0 | Status: SHIPPED | OUTPATIENT
Start: 2025-08-26

## 2025-08-26 RX ORDER — CLINDAMYCIN HYDROCHLORIDE 150 MG/1
150 CAPSULE ORAL EVERY 8 HOURS
Qty: 21 CAPSULE | Refills: 0 | Status: SHIPPED | OUTPATIENT
Start: 2025-08-26 | End: 2025-09-02

## 2025-08-26 RX ORDER — CICLOPIROX 80 MG/ML
SOLUTION TOPICAL NIGHTLY
COMMUNITY
Start: 2025-07-29

## 2025-09-02 ENCOUNTER — OFFICE VISIT (OUTPATIENT)
Dept: FAMILY MEDICINE | Facility: CLINIC | Age: 80
End: 2025-09-02
Payer: MEDICARE

## 2025-09-02 VITALS
SYSTOLIC BLOOD PRESSURE: 122 MMHG | WEIGHT: 106 LBS | HEIGHT: 62 IN | DIASTOLIC BLOOD PRESSURE: 76 MMHG | BODY MASS INDEX: 19.51 KG/M2 | RESPIRATION RATE: 16 BRPM | HEART RATE: 80 BPM | OXYGEN SATURATION: 97 %

## 2025-09-02 DIAGNOSIS — S51.012D SKIN TEAR OF LEFT ELBOW WITHOUT COMPLICATION, SUBSEQUENT ENCOUNTER: Primary | ICD-10-CM

## 2025-09-02 PROCEDURE — 99999 PR PBB SHADOW E&M-EST. PATIENT-LVL IV: CPT | Mod: PBBFAC,,,

## 2025-09-02 PROCEDURE — 99214 OFFICE O/P EST MOD 30 MIN: CPT | Mod: PBBFAC,PN

## 2025-09-02 RX ORDER — DOXYCYCLINE 100 MG/1
100 CAPSULE ORAL EVERY 12 HOURS
Qty: 20 CAPSULE | Refills: 0 | Status: SHIPPED | OUTPATIENT
Start: 2025-09-02